# Patient Record
Sex: MALE | Race: WHITE | Employment: UNEMPLOYED | URBAN - METROPOLITAN AREA
[De-identification: names, ages, dates, MRNs, and addresses within clinical notes are randomized per-mention and may not be internally consistent; named-entity substitution may affect disease eponyms.]

---

## 2017-03-15 ENCOUNTER — HOSPITAL ENCOUNTER (INPATIENT)
Age: 47
LOS: 9 days | Discharge: HOME | DRG: 189 | End: 2017-03-24
Attending: STUDENT IN AN ORGANIZED HEALTH CARE EDUCATION/TRAINING PROGRAM | Admitting: INTERNAL MEDICINE
Payer: COMMERCIAL

## 2017-03-15 ENCOUNTER — APPOINTMENT (OUTPATIENT)
Dept: GENERAL RADIOLOGY | Age: 47
DRG: 189 | End: 2017-03-15
Attending: STUDENT IN AN ORGANIZED HEALTH CARE EDUCATION/TRAINING PROGRAM
Payer: COMMERCIAL

## 2017-03-15 ENCOUNTER — APPOINTMENT (OUTPATIENT)
Dept: CT IMAGING | Age: 47
DRG: 189 | End: 2017-03-15
Attending: INTERNAL MEDICINE
Payer: COMMERCIAL

## 2017-03-15 DIAGNOSIS — R09.02 HYPOXIA: Primary | ICD-10-CM

## 2017-03-15 DIAGNOSIS — R06.02 SOB (SHORTNESS OF BREATH): ICD-10-CM

## 2017-03-15 DIAGNOSIS — R07.89 ACUTE CHEST WALL PAIN: ICD-10-CM

## 2017-03-15 DIAGNOSIS — T40.2X5A THERAPEUTIC OPIOID INDUCED CONSTIPATION: ICD-10-CM

## 2017-03-15 DIAGNOSIS — S22.42XA CLOSED FRACTURE OF MULTIPLE RIBS OF LEFT SIDE, INITIAL ENCOUNTER: ICD-10-CM

## 2017-03-15 DIAGNOSIS — K59.03 THERAPEUTIC OPIOID INDUCED CONSTIPATION: ICD-10-CM

## 2017-03-15 PROBLEM — J96.01 ACUTE HYPOXEMIC RESPIRATORY FAILURE (HCC): Status: ACTIVE | Noted: 2017-03-15

## 2017-03-15 PROBLEM — S22.49XA RIB FRACTURES: Status: ACTIVE | Noted: 2017-03-15

## 2017-03-15 LAB
ALBUMIN SERPL BCP-MCNC: 4.5 G/DL (ref 3.5–5)
ALBUMIN/GLOB SERPL: 1.2 {RATIO} (ref 1.1–2.2)
ALP SERPL-CCNC: 60 U/L (ref 45–117)
ALT SERPL-CCNC: 86 U/L (ref 12–78)
AMPHET UR QL SCN: NEGATIVE
ANION GAP BLD CALC-SCNC: 13 MMOL/L (ref 5–15)
APPEARANCE UR: CLEAR
ARTERIAL PATENCY WRIST A: YES
AST SERPL W P-5'-P-CCNC: 76 U/L (ref 15–37)
BACTERIA URNS QL MICRO: ABNORMAL /HPF
BARBITURATES UR QL SCN: NEGATIVE
BASE DEFICIT BLDA-SCNC: 2 MMOL/L
BASOPHILS # BLD AUTO: 0 K/UL (ref 0–0.1)
BASOPHILS # BLD: 1 % (ref 0–1)
BDY SITE: ABNORMAL
BENZODIAZ UR QL: NEGATIVE
BILIRUB SERPL-MCNC: 0.7 MG/DL (ref 0.2–1)
BILIRUB UR QL CFM: NEGATIVE
BUN SERPL-MCNC: 12 MG/DL (ref 6–20)
BUN/CREAT SERPL: 13 (ref 12–20)
CALCIUM SERPL-MCNC: 9.4 MG/DL (ref 8.5–10.1)
CANNABINOIDS UR QL SCN: POSITIVE
CAOX CRY URNS QL MICRO: ABNORMAL
CHLORIDE SERPL-SCNC: 102 MMOL/L (ref 97–108)
CO2 SERPL-SCNC: 22 MMOL/L (ref 21–32)
COCAINE UR QL SCN: NEGATIVE
COLOR UR: ABNORMAL
CREAT SERPL-MCNC: 0.89 MG/DL (ref 0.7–1.3)
D DIMER PPP FEU-MCNC: 0.4 MG/L FEU (ref 0–0.65)
DRUG SCRN COMMENT,DRGCM: ABNORMAL
EOSINOPHIL # BLD: 0.1 K/UL (ref 0–0.4)
EOSINOPHIL NFR BLD: 3 % (ref 0–7)
EPITH CASTS URNS QL MICRO: ABNORMAL /LPF
ERYTHROCYTE [DISTWIDTH] IN BLOOD BY AUTOMATED COUNT: 12.7 % (ref 11.5–14.5)
ETHANOL SERPL-MCNC: <10 MG/DL
GLOBULIN SER CALC-MCNC: 3.7 G/DL (ref 2–4)
GLUCOSE SERPL-MCNC: 123 MG/DL (ref 65–100)
GLUCOSE UR STRIP.AUTO-MCNC: NEGATIVE MG/DL
HCO3 BLDA-SCNC: 22 MMOL/L (ref 22–26)
HCT VFR BLD AUTO: 43.7 % (ref 36.6–50.3)
HGB BLD-MCNC: 15.2 G/DL (ref 12.1–17)
HGB UR QL STRIP: NEGATIVE
KETONES UR QL STRIP.AUTO: NEGATIVE MG/DL
LACTATE SERPL-SCNC: 1 MMOL/L (ref 0.4–2)
LEUKOCYTE ESTERASE UR QL STRIP.AUTO: NEGATIVE
LYMPHOCYTES # BLD AUTO: 27 % (ref 12–49)
LYMPHOCYTES # BLD: 1 K/UL (ref 0.8–3.5)
MCH RBC QN AUTO: 34.5 PG (ref 26–34)
MCHC RBC AUTO-ENTMCNC: 34.8 G/DL (ref 30–36.5)
MCV RBC AUTO: 99.1 FL (ref 80–99)
METHADONE UR QL: NEGATIVE
MONOCYTES # BLD: 0.2 K/UL (ref 0–1)
MONOCYTES NFR BLD AUTO: 5 % (ref 5–13)
MUCOUS THREADS URNS QL MICRO: ABNORMAL /LPF
NEUTS SEG # BLD: 2.4 K/UL (ref 1.8–8)
NEUTS SEG NFR BLD AUTO: 64 % (ref 32–75)
NITRITE UR QL STRIP.AUTO: NEGATIVE
OPIATES UR QL: POSITIVE
PCO2 BLDA: 37 MMHG (ref 35–45)
PCP UR QL: NEGATIVE
PH BLDA: 7.4 [PH] (ref 7.35–7.45)
PH UR STRIP: 5.5 [PH] (ref 5–8)
PLATELET # BLD AUTO: 70 K/UL (ref 150–400)
PO2 BLDA: 58 MMHG (ref 80–100)
POTASSIUM SERPL-SCNC: 4.3 MMOL/L (ref 3.5–5.1)
PROT SERPL-MCNC: 8.2 G/DL (ref 6.4–8.2)
PROT UR STRIP-MCNC: 30 MG/DL
RBC # BLD AUTO: 4.41 M/UL (ref 4.1–5.7)
RBC #/AREA URNS HPF: ABNORMAL /HPF (ref 0–5)
SAO2 % BLD: 91 % (ref 92–97)
SAO2% DEVICE SAO2% SENSOR NAME: ABNORMAL
SERVICE CMNT-IMP: ABNORMAL
SODIUM SERPL-SCNC: 137 MMOL/L (ref 136–145)
SP GR UR REFRACTOMETRY: >1.03 (ref 1–1.03)
SPECIMEN SITE: ABNORMAL
TROPONIN I SERPL-MCNC: <0.04 NG/ML
UROBILINOGEN UR QL STRIP.AUTO: 0.2 EU/DL (ref 0.2–1)
WBC # BLD AUTO: 3.8 K/UL (ref 4.1–11.1)
WBC URNS QL MICRO: ABNORMAL /HPF (ref 0–4)

## 2017-03-15 PROCEDURE — 74011250636 HC RX REV CODE- 250/636: Performed by: STUDENT IN AN ORGANIZED HEALTH CARE EDUCATION/TRAINING PROGRAM

## 2017-03-15 PROCEDURE — 93970 EXTREMITY STUDY: CPT

## 2017-03-15 PROCEDURE — 36600 WITHDRAWAL OF ARTERIAL BLOOD: CPT | Performed by: INTERNAL MEDICINE

## 2017-03-15 PROCEDURE — 81001 URINALYSIS AUTO W/SCOPE: CPT | Performed by: INTERNAL MEDICINE

## 2017-03-15 PROCEDURE — 87040 BLOOD CULTURE FOR BACTERIA: CPT | Performed by: STUDENT IN AN ORGANIZED HEALTH CARE EDUCATION/TRAINING PROGRAM

## 2017-03-15 PROCEDURE — 96372 THER/PROPH/DIAG INJ SC/IM: CPT

## 2017-03-15 PROCEDURE — 71020 XR CHEST PA LAT: CPT

## 2017-03-15 PROCEDURE — 80053 COMPREHEN METABOLIC PANEL: CPT | Performed by: STUDENT IN AN ORGANIZED HEALTH CARE EDUCATION/TRAINING PROGRAM

## 2017-03-15 PROCEDURE — 85025 COMPLETE CBC W/AUTO DIFF WBC: CPT | Performed by: STUDENT IN AN ORGANIZED HEALTH CARE EDUCATION/TRAINING PROGRAM

## 2017-03-15 PROCEDURE — 85379 FIBRIN DEGRADATION QUANT: CPT | Performed by: INTERNAL MEDICINE

## 2017-03-15 PROCEDURE — 74011250636 HC RX REV CODE- 250/636: Performed by: INTERNAL MEDICINE

## 2017-03-15 PROCEDURE — 65660000000 HC RM CCU STEPDOWN

## 2017-03-15 PROCEDURE — 74011250637 HC RX REV CODE- 250/637: Performed by: INTERNAL MEDICINE

## 2017-03-15 PROCEDURE — 80307 DRUG TEST PRSMV CHEM ANLYZR: CPT | Performed by: INTERNAL MEDICINE

## 2017-03-15 PROCEDURE — 74011250637 HC RX REV CODE- 250/637: Performed by: PHYSICIAN ASSISTANT

## 2017-03-15 PROCEDURE — 82803 BLOOD GASES ANY COMBINATION: CPT | Performed by: INTERNAL MEDICINE

## 2017-03-15 PROCEDURE — 83605 ASSAY OF LACTIC ACID: CPT | Performed by: STUDENT IN AN ORGANIZED HEALTH CARE EDUCATION/TRAINING PROGRAM

## 2017-03-15 PROCEDURE — 99285 EMERGENCY DEPT VISIT HI MDM: CPT

## 2017-03-15 PROCEDURE — 74011250637 HC RX REV CODE- 250/637: Performed by: STUDENT IN AN ORGANIZED HEALTH CARE EDUCATION/TRAINING PROGRAM

## 2017-03-15 PROCEDURE — 94762 N-INVAS EAR/PLS OXIMTRY CONT: CPT

## 2017-03-15 PROCEDURE — 51798 US URINE CAPACITY MEASURE: CPT

## 2017-03-15 PROCEDURE — 36415 COLL VENOUS BLD VENIPUNCTURE: CPT | Performed by: STUDENT IN AN ORGANIZED HEALTH CARE EDUCATION/TRAINING PROGRAM

## 2017-03-15 PROCEDURE — 84484 ASSAY OF TROPONIN QUANT: CPT | Performed by: PHYSICIAN ASSISTANT

## 2017-03-15 RX ORDER — ENOXAPARIN SODIUM 100 MG/ML
40 INJECTION SUBCUTANEOUS EVERY 24 HOURS
Status: DISCONTINUED | OUTPATIENT
Start: 2017-03-15 | End: 2017-03-15

## 2017-03-15 RX ORDER — NALOXONE HYDROCHLORIDE 0.4 MG/ML
0.4 INJECTION, SOLUTION INTRAMUSCULAR; INTRAVENOUS; SUBCUTANEOUS AS NEEDED
Status: DISCONTINUED | OUTPATIENT
Start: 2017-03-15 | End: 2017-03-24 | Stop reason: HOSPADM

## 2017-03-15 RX ORDER — ENOXAPARIN SODIUM 100 MG/ML
100 INJECTION SUBCUTANEOUS EVERY 12 HOURS
Status: DISCONTINUED | OUTPATIENT
Start: 2017-03-15 | End: 2017-03-15

## 2017-03-15 RX ORDER — DIPHENHYDRAMINE HCL 25 MG
25 CAPSULE ORAL
Status: DISCONTINUED | OUTPATIENT
Start: 2017-03-15 | End: 2017-03-16

## 2017-03-15 RX ORDER — ONDANSETRON 2 MG/ML
4 INJECTION INTRAMUSCULAR; INTRAVENOUS
Status: DISCONTINUED | OUTPATIENT
Start: 2017-03-15 | End: 2017-03-24 | Stop reason: HOSPADM

## 2017-03-15 RX ORDER — PANTOPRAZOLE SODIUM 40 MG/1
40 TABLET, DELAYED RELEASE ORAL EVERY OTHER DAY
Status: DISCONTINUED | OUTPATIENT
Start: 2017-03-15 | End: 2017-03-24 | Stop reason: HOSPADM

## 2017-03-15 RX ORDER — DIPHENHYDRAMINE HCL 25 MG
25 CAPSULE ORAL
Status: DISCONTINUED | OUTPATIENT
Start: 2017-03-15 | End: 2017-03-15 | Stop reason: SDUPTHER

## 2017-03-15 RX ORDER — SODIUM CHLORIDE 9 MG/ML
50 INJECTION, SOLUTION INTRAVENOUS CONTINUOUS
Status: DISCONTINUED | OUTPATIENT
Start: 2017-03-15 | End: 2017-03-23

## 2017-03-15 RX ORDER — ACETAMINOPHEN 325 MG/1
650 TABLET ORAL
Status: DISCONTINUED | OUTPATIENT
Start: 2017-03-15 | End: 2017-03-17

## 2017-03-15 RX ORDER — FENOFIBRATE 145 MG/1
145 TABLET, COATED ORAL DAILY
Status: DISCONTINUED | OUTPATIENT
Start: 2017-03-15 | End: 2017-03-24 | Stop reason: HOSPADM

## 2017-03-15 RX ORDER — KETOROLAC TROMETHAMINE 30 MG/ML
60 INJECTION, SOLUTION INTRAMUSCULAR; INTRAVENOUS ONCE
Status: COMPLETED | OUTPATIENT
Start: 2017-03-15 | End: 2017-03-15

## 2017-03-15 RX ORDER — GUAIFENESIN 600 MG/1
600 TABLET, EXTENDED RELEASE ORAL EVERY 12 HOURS
Status: DISCONTINUED | OUTPATIENT
Start: 2017-03-15 | End: 2017-03-24 | Stop reason: HOSPADM

## 2017-03-15 RX ORDER — HYDROCODONE BITARTRATE AND ACETAMINOPHEN 5; 325 MG/1; MG/1
1 TABLET ORAL
Status: DISCONTINUED | OUTPATIENT
Start: 2017-03-15 | End: 2017-03-24 | Stop reason: HOSPADM

## 2017-03-15 RX ORDER — ROSUVASTATIN CALCIUM 10 MG/1
10 TABLET, COATED ORAL DAILY
Status: DISCONTINUED | OUTPATIENT
Start: 2017-03-15 | End: 2017-03-24 | Stop reason: HOSPADM

## 2017-03-15 RX ORDER — HYDROCODONE BITARTRATE AND ACETAMINOPHEN 5; 300 MG/1; MG/1
1 TABLET ORAL
COMMUNITY
End: 2017-03-24

## 2017-03-15 RX ORDER — ACETAMINOPHEN 500 MG
1000 TABLET ORAL
Status: COMPLETED | OUTPATIENT
Start: 2017-03-15 | End: 2017-03-15

## 2017-03-15 RX ORDER — MORPHINE SULFATE 4 MG/ML
4 INJECTION, SOLUTION INTRAMUSCULAR; INTRAVENOUS
Status: COMPLETED | OUTPATIENT
Start: 2017-03-15 | End: 2017-03-15

## 2017-03-15 RX ORDER — HYDROMORPHONE HYDROCHLORIDE 2 MG/1
2 TABLET ORAL
Status: DISCONTINUED | OUTPATIENT
Start: 2017-03-15 | End: 2017-03-16

## 2017-03-15 RX ORDER — LIDOCAINE 50 MG/G
1 PATCH TOPICAL EVERY 24 HOURS
Status: DISCONTINUED | OUTPATIENT
Start: 2017-03-15 | End: 2017-03-24 | Stop reason: HOSPADM

## 2017-03-15 RX ADMIN — FENOFIBRATE 145 MG: 145 TABLET ORAL at 13:22

## 2017-03-15 RX ADMIN — GUAIFENESIN 600 MG: 600 TABLET, EXTENDED RELEASE ORAL at 20:32

## 2017-03-15 RX ADMIN — PANTOPRAZOLE SODIUM 40 MG: 40 TABLET, DELAYED RELEASE ORAL at 12:03

## 2017-03-15 RX ADMIN — DIPHENHYDRAMINE HYDROCHLORIDE 25 MG: 25 CAPSULE ORAL at 16:12

## 2017-03-15 RX ADMIN — HYDROMORPHONE HYDROCHLORIDE 2 MG: 2 TABLET ORAL at 16:13

## 2017-03-15 RX ADMIN — HYDROMORPHONE HYDROCHLORIDE 2 MG: 2 TABLET ORAL at 12:03

## 2017-03-15 RX ADMIN — ACETAMINOPHEN 1000 MG: 500 TABLET ORAL at 08:16

## 2017-03-15 RX ADMIN — HYDROMORPHONE HYDROCHLORIDE 2 MG: 2 TABLET ORAL at 20:32

## 2017-03-15 RX ADMIN — DIPHENHYDRAMINE HYDROCHLORIDE 25 MG: 25 CAPSULE ORAL at 20:32

## 2017-03-15 RX ADMIN — SODIUM CHLORIDE 50 ML/HR: 900 INJECTION, SOLUTION INTRAVENOUS at 12:03

## 2017-03-15 RX ADMIN — Medication 4 MG: at 10:14

## 2017-03-15 RX ADMIN — DIPHENHYDRAMINE HYDROCHLORIDE 25 MG: 25 CAPSULE ORAL at 12:03

## 2017-03-15 RX ADMIN — KETOROLAC TROMETHAMINE 60 MG: 30 INJECTION, SOLUTION INTRAMUSCULAR at 08:17

## 2017-03-15 RX ADMIN — ROSUVASTATIN CALCIUM 10 MG: 10 TABLET, FILM COATED ORAL at 13:22

## 2017-03-15 RX ADMIN — GUAIFENESIN 600 MG: 600 TABLET, EXTENDED RELEASE ORAL at 13:22

## 2017-03-15 RX ADMIN — HYDROCODONE BITARTRATE AND ACETAMINOPHEN 1 TABLET: 5; 325 TABLET ORAL at 21:44

## 2017-03-15 NOTE — PROCEDURES
[facility]  *** FINAL REPORT ***    Name: Marge Yu  MRN: [id number]    Inpatient  : 1970  HIS Order #: [id number]  [address] [facility] Visit #: [id number]  Date: 15 Mar 2017    TYPE OF TEST: Peripheral Venous Testing    REASON FOR TEST  Shortness of breath    Right Leg:-  Deep venous thrombosis:           No  Superficial venous thrombosis:    No  Deep venous insufficiency:        No  Superficial venous insufficiency: No    Left Leg:-  Deep venous thrombosis:           No  Superficial venous thrombosis:    No  Deep venous insufficiency:        No  Superficial venous insufficiency: No      INTERPRETATION/FINDINGS  PROCEDURE:  BILATERAL LE VENOUS DUPLEX. Evaluation of lower extremity veins with ultrasound (B-mode imaging,  pulsed Doppler, color Doppler). Includes the common femoral, deep  femoral, femoral, popliteal, posterior tibial, peroneal, and great  saphenous veins. FINDINGS:  Dennis Prose scale and color flow duplex images of the veins in  both lower extremities demonstrate normal compressibility, spontaneous   and augmented flow profiles, and absence of filling defects  throughout the deep and superficial veins in both lower extremities. CONCLUSION:  Bilateral lower extremity venous duplex negative for deep   venous thrombosis or thrombophlebitis. ADDITIONAL COMMENTS    I have personally reviewed the data relevant to the interpretation of  this  study. TECHNOLOGIST: Berta Hollins. Alvaro  Signed: 03/15/2017 05:49 PM    PHYSICIAN: Vashti Dawley. Buck Boast, MD  Signed: 2017 11:57 AM

## 2017-03-15 NOTE — PROGRESS NOTES
[facility]: MED RECONCILIATION    Comments/Recommendations:    Patient was alert and oriented   Allergies were verified with patient; no new allergies reported   Patient stated he takes omeprazole after eating in the morning since he has to take the fenofibrate with food; counseled on benefit of taking 1 hour before eating   Patient was prescribed hydrocodone-APAP 5/300mg back on 02/12/2017; still had most of the bottle left and has been taking 1 tab every 4 hours since Sunday due to rib injury (prescribed every 6 hours as needed)   Takes omeprazole every other day (last dose was Monday 03/13/2017)    Medications added:     · Hydrocodone-APAP 5/300mg    Medications removed:    · Oxycodone IR 5mg (1 tablet every 4 hours as needed for pain)    Medications adjusted:    · None    Information obtained from: patient, Rx Query    Allergies: Wellbutrin [bupropion]; Adhesive tape-silicones; Azithromycin; Cephalexin; Hydromorphone; and Ketamine    Prior to Admission Medications:   Prior to Admission Medications   Prescriptions Last Dose Informant Patient Reported? Taking? HYDROcodone-acetaminophen (XODOL) 5-300 mg tablet 3/14/2017 at hs Self Yes Yes   Sig: Take 1 Tab by mouth every four (4) hours as needed. MULTIVIT-MINERALS/FOLIC ACID (DAILY GUMMIES PO) 3/14/2017 at am Self Yes Yes   Sig: Take 2 Tabs by mouth daily. cetirizine (ZYRTEC) 10 mg tablet 3/14/2017 at am Self Yes Yes   Sig: Take 10 mg by mouth daily. In the morning. cholecalciferol, vitamin D3, 2,000 unit tab 3/14/2017 at am Self Yes Yes   Sig: Take 4,000 Units by mouth daily. In the morning. fenofibrate (LOFIBRA) 160 mg tablet 3/14/2017 at am Self Yes Yes   Sig: Take 160 mg by mouth daily. In the morning. omeprazole (PRILOSEC) 20 mg capsule 3/13/2017 at am Self Yes Yes   Sig: Take 20 mg by mouth every other day. In the morning. rosuvastatin (CRESTOR) 10 mg tablet 3/14/2017 at pm Self Yes Yes   Sig: Take 10 mg by mouth daily. In the morning. Facility-Administered Medications: None     Thank you,  [employer]  Class of 2017

## 2017-03-15 NOTE — H&P
[facility]  [address]  [phone]    Hospitalist Admission Note      NAME:  Akiko Jernigan   :   1970   MRN:  [id number]     PCP:  Fabián Alan MD     Date/Time:  3/15/2017 9:50 AM          Subjective:     CHIEF COMPLAINT: dyspnea     HISTORY OF PRESENT ILLNESS:     Mr. Preet Hopson is a 48 y.o.  male who presented to the Emergency Department complaining of dyspnea. It has progressed over 3 days since a fall in which he injured left ribs. He was placed on PO percocet. DE LA TORRE worsened over days, with cough, inability to clear his thin white sputum, and severe pleuritic pain. ER workup shows severe hypoxia, low platelets, but no signs of PNA. We will admit him for management. Past Medical History:   Diagnosis Date    Diverticula of colon     GERD (gastroesophageal reflux disease)     occasional    Hypercholesteremia     Microscopic hematuria     Sleep apnea         Past Surgical History:   Procedure Laterality Date    HX APPENDECTOMY      HX 2008    left ear TM graft    HX HEENT  Sep 2009    left ear surgery - cholestoma    HX HEENT  Sep 2010    left ear TM graft revision    HX HEENT  Aug 1998    left ear tube placement    HX ORTHOPAEDIC      left knee A/S x 3    HX ORTHOPAEDIC      left knee ACL repair       Social History   Substance Use Topics    Smoking status: Current Every Day Smoker     Packs/day: 1.00     Years: 27.00    Smokeless tobacco: Never Used    Alcohol use 1.0 oz/week     1 Cans of beer, 1 Standard drinks or equivalent per week      Comment: 3x/ wk        Family History   Problem Relation Age of Onset    Heart Disease Father     Hypertension Sister         Allergies   Allergen Reactions    Wellbutrin [Bupropion] Swelling     Angioedema      Adhesive Tape-Silicones Contact Dermatitis     Pt reports he can tolerate paper tape.     Azithromycin Hives    Cephalexin Hives     Pt has taken Augmentin before and tolerated it per rn    Hydromorphone Itching and Nausea Only    Ketamine Hives and Swelling        Prior to Admission medications    Medication Sig Start Date End Date Taking? Authorizing Provider   HYDROcodone-acetaminophen (XODOL) 5-300 mg tablet Take 1 Tab by mouth every four (4) hours as needed. Yes Historical Provider   cetirizine (ZYRTEC) 10 mg tablet Take 10 mg by mouth daily. In the morning. Yes Historical Provider   fenofibrate (LOFIBRA) 160 mg tablet Take 160 mg by mouth daily. In the morning. Yes Historical Provider   rosuvastatin (CRESTOR) 10 mg tablet Take 10 mg by mouth daily. In the morning. Yes Historical Provider   omeprazole (PRILOSEC) 20 mg capsule Take 20 mg by mouth every other day. In the morning. Yes Historical Provider   cholecalciferol, vitamin D3, 2,000 unit tab Take 4,000 Units by mouth daily. In the morning. Yes Historical Provider   MULTIVIT-MINERALS/FOLIC ACID (DAILY GUMMIES PO) Take 2 Tabs by mouth daily.    Yes Historical Provider       Review of Systems:  (bold if positive, if negative)    Gen:  Eyes:  ENT:  CVS:  chest wall painPulm:  CoughdyspneaGI:    :    MS:  PainSkin:  Psych:  Endo:    Hem:  Renal:    Neuro:        Objective:      VITALS:    Vital signs reviewed; most recent are:    Visit Vitals    BP (!) 153/96    Pulse 86    Temp 98.2 °F (36.8 °C)    Resp 19    Wt 95.3 kg (210 lb)    SpO2 93%    BMI 32.89 kg/m2     SpO2 Readings from Last 6 Encounters:   03/15/17 93%   06/21/16 97%   06/06/16 94%   11/03/11 96%   10/26/11 97%    O2 Flow Rate (L/min): 15 l/min   No intake or output data in the 24 hours ending 03/15/17 0950     Exam:     Physical Exam:    Gen:  Well-developed, well-nourished, in no acute distress  HEENT:  Pink conjunctivae, PERRL, hearing intact to voice, moist mucous membranes  Neck:  Supple, without masses, thyroid non-tender  Resp: Shallow accessory muscle use, clear breath sounds without wheezes rales or rhonchi  Card:  No murmurs, tachycardic S1, S2 without thrills, bruits or peripheral edema  Abd:  Soft, non-tender, non-distended, normoactive bowel sounds are present, no mass  Lymph:  No cervical or inguinal adenopathy  Musc:  No cyanosis or clubbing, tender below L scapula  Skin:  No rashes or ulcers, skin turgor is good  Neuro:  Cranial nerves are grossly intact, no focal motor weakness, follows commands appropriately  Psych:  Good insight, oriented to person, place and time, alert     Labs:    Recent Labs      03/15/17   0756   WBC  3.8*   HGB  15.2   HCT  43.7   PLT  70*     Recent Labs      03/15/17   0756   NA  137   K  4.3   CL  102   CO2  22   GLU  123*   BUN  12   CREA  0.89   CA  9.4   ALB  4.5   TBILI  0.7   SGOT  76*   ALT  86*     No results found for: GLUCPOC  No results for input(s): PH, PCO2, PO2, HCO3, FIO2 in the last 72 hours. No results for input(s): INR in the last 72 hours. No lab exists for component: INREXT  All Micro Results     Procedure Component Value Units Date/Time    CULTURE, BLOOD, PAIRED [[id number]]     Order Status:  Sent Specimen:  Blood           I have reviewed previous records       Assessment and Plan:      Acute hypoxemic respiratory failure - POA, unclear etiology. Splinting? Narcotic suppression? Lung contusion? PE? Admit to stepdown, as he is on NRB and may deteriorate requiring BiPAP. Check ABG. Check DDimer, CTA and LE doppler to assess for DVT/PE. Sleep apnea - Resume CPAP if tolerated. Rib fractures - POA, due to fall. CT to assess for lytic lesions. I cannot consult Ortho, who does not handle rib fractures. They recommended thoracic surgery consult, which is not available at [facility]. Pain control. Check drug and alcohol screen for fall risk assessment. Hyperlipidemia - Continue rosuvastatin and fenofibrate    GERD (gastroesophageal reflux disease) - Continue PPI    Thrombocytopenia - Follow. Loss due to clots after fall? Spleen? Tobacco abuse - Provide patch. Advised cessation. Spent 5 minutes in addition to all other time spent on admission, noted below.        Telemetry reviewed:   normal sinus rhythm    Risk of deterioration: high      Total time spent with patient: 79 Minutes Signed out to Dr Tye Powell at [facility] discussed with: Patient, Nursing Staff and >50% of time spent in counseling and coordination of care    Discussed:  Care Plan       ___________________________________________________    Attending Physician: Rafaela Edwards MD

## 2017-03-15 NOTE — IP AVS SNAPSHOT
[facility] 
 
 
 [address] 
[phone] Patient: Juliette Rodriguez MRN: [id number] MNV:3/9/0503 You are allergic to the following Allergen Reactions Wellbutrin (Bupropion) Swelling Angioedema Adhesive Tape-Silicones Contact Dermatitis Pt reports he can tolerate paper tape. Azithromycin Hives Cephalexin Hives Pt has taken Augmentin before and tolerated it per rn Hydromorphone Itching Nausea Only Ketamine Hives Swelling Recent Documentation Height Weight BMI Smoking Status 1.702 m 90.2 kg 31.14 kg/m2 Current Every Day Smoker Emergency Contacts Name Discharge Info Relation Home Work Mobile [address] CAREGIVER [3] Spouse [3] [phone] About your hospitalization You were admitted on:  March 15, 2017 You last received care in the:  [facility] 5M1 MED SURG 1 You were discharged on:  March 24, 2017 Unit phone number:  [phone] Why you were hospitalized Your primary diagnosis was:  Acute Hypoxemic Respiratory Failure (Hcc) Your diagnoses also included:  Gerd (Gastroesophageal Reflux Disease), Hypercholesteremia, Hyperlipidemia, Sleep Apnea, Rib Fractures, Acute Chest Wall Pain, Cough, Sob (Shortness Of Breath), Therapeutic Opioid Induced Constipation Providers Seen During Your Hospitalizations Provider Role Specialty Primary office phone Mikhail Au MD Attending Provider Emergency Medicine [phone] David Quesada MD Attending Provider [facility] [phone] Your Primary Care Physician (PCP) Primary Care Physician Office Phone Office Fax Trena Hsieh [phone] Follow-up Information Follow up With Details Comments Contact Info MD Zach Ingram [address] [place] [address] 53 [address] [phone] Current Discharge Medication List  
  
START taking these medications Dose & Instructions Dispensing Information Comments Morning Noon Evening Bedtime  
 ibuprofen 800 mg tablet Commonly known as:  MOTRIN Your last dose was: Your next dose is:    
   
   
 Dose:  800 mg Take 1 Tab by mouth three (3) times daily. Quantity:  90 Tab Refills:  0  
     
   
   
   
  
 lidocaine 5 % Commonly known as:  Arzella Getting Your last dose was: Your next dose is:    
   
   
 Apply patch to the affected area for 12 hours a day and remove for 12 hours a day. Quantity:  30 Each Refills:  0 CONTINUE these medications which have NOT CHANGED Dose & Instructions Dispensing Information Comments Morning Noon Evening Bedtime  
 cetirizine 10 mg tablet Commonly known as:  ZYRTEC Your last dose was: Your next dose is:    
   
   
 Dose:  10 mg Take 10 mg by mouth daily. In the morning. Refills:  0  
     
   
   
   
  
 cholecalciferol (vitamin D3) 2,000 unit Tab Your last dose was: Your next dose is:    
   
   
 Dose:  4000 Units Take 4,000 Units by mouth daily. In the morning. Refills:  0 DAILY GUMMIES PO Your last dose was: Your next dose is:    
   
   
 Dose:  2 Tab Take 2 Tabs by mouth daily. Refills:  0  
     
   
   
   
  
 fenofibrate 160 mg tablet Commonly known as:  LOFIBRA Your last dose was: Your next dose is:    
   
   
 Dose:  160 mg Take 160 mg by mouth daily. In the morning. Refills:  0  
     
   
   
   
  
 omeprazole 20 mg capsule Commonly known as:  PRILOSEC Your last dose was: Your next dose is:    
   
   
 Dose:  20 mg Take 20 mg by mouth every other day. In the morning. Refills:  0  
     
   
   
   
  
 rosuvastatin 10 mg tablet Commonly known as:  CRESTOR  
   
 Your last dose was: Your next dose is:    
   
   
 Dose:  10 mg Take 10 mg by mouth daily. In the morning. Refills:  0 STOP taking these medications HYDROcodone-acetaminophen 5-300 mg tablet Commonly known as:  Iban Demarco Where to Get Your Medications Information on where to get these meds will be given to you by the nurse or doctor. ! Ask your nurse or doctor about these medications  
  ibuprofen 800 mg tablet  
 lidocaine 5 % Discharge Instructions Patient Discharge Instructions Consuelo Sirena / [id number] : 1970 Admitted 3/15/2017 Discharged: 3/24/2017 7:02 AM  
 
ACUTE DIAGNOSES: 
Acute hypoxemic respiratory failure (RUST 75.) CHRONIC MEDICAL DIAGNOSES: 
Problem List as of 3/24/2017  Date Reviewed: 3/15/2017 Codes Class Noted - Resolved Acute chest wall pain ICD-10-CM: R07.89 ICD-9-CM: 786.52  3/17/2017 - Present Cough ICD-10-CM: R05 ICD-9-CM: 786.2  3/17/2017 - Present SOB (shortness of breath) ICD-10-CM: R06.02 
ICD-9-CM: 786.05  3/17/2017 - Present Therapeutic opioid induced constipation ICD-10-CM: K59.03, T40.2X5A 
ICD-9-CM: 564.09, E935.2  3/17/2017 - Present * (Principal)Acute hypoxemic respiratory failure (RUST 75.) ICD-10-CM: J96.01 
ICD-9-CM: 518.81  3/15/2017 - Present Sleep apnea ICD-10-CM: G47.30 ICD-9-CM: 780.57  Unknown - Present Hypercholesteremia ICD-10-CM: E78.00 ICD-9-CM: 272.0  Unknown - Present Rib fractures ICD-10-CM: A81.57RC ICD-9-CM: 807.00  3/15/2017 - Present Hyperlipidemia ICD-10-CM: E78.5 ICD-9-CM: 272.4  2016 - Present GERD (gastroesophageal reflux disease) ICD-10-CM: K21.9 ICD-9-CM: 530.81  2016 - Present RESOLVED: Diverticulitis of intestine with abscess ICD-10-CM: K57.80 ICD-9-CM: 562.11, 569.5  2016 - 3/15/2017  RESOLVED: Diverticulitis ICD-10-CM: K13.91 
 ICD-9-CM: 562.11  5/31/2016 - 3/15/2017 RESOLVED: Bladder wall thickening ICD-10-CM: N32.89 ICD-9-CM: 596.89  5/31/2016 - 3/15/2017 RESOLVED: Hematuria ICD-10-CM: R31.9 ICD-9-CM: 599.70  5/31/2016 - 3/15/2017 DISCHARGE MEDICATIONS:  
 
 
 
· It is important that you take the medication exactly as they are prescribed. · Keep your medication in the bottles provided by the pharmacist and keep a list of the medication names, dosages, and times to be taken in your wallet. · Do not take other medications without consulting your doctor. DIET:  Regular Diet ACTIVITY: Activity as tolerated ADDITIONAL INFORMATION: If you experience any of the following symptoms then please call your primary care physician or return to the emergency room if you cannot get hold of your doctor: Fever, chills, nausea, vomiting, diarrhea, change in mentation, falling, bleeding, shortness of breath. FOLLOW UP CARE: 
Dr. Freddie Henry MD  you are to call and set up an appointment to see them with in 1 week. Use incentive spirometry throughout the day Follow-up with specialists at directed by them Information obtained by : 
I understand that if any problems occur once I am at home I am to contact my physician. I understand and acknowledge receipt of the instructions indicated above. Physician's or R.N.'s Signature                                                                  Date/Time Patient or Representative Signature                                                          Date/Time Discharge Orders None MyChart Announcement We are excited to announce that we are making your provider's discharge notes available to you in [employer] You will see these notes when they are completed and signed by the physician that discharged you from your recent hospital stay. If you have any questions or concerns about any information you see in [employer]!, please call the Health Information Department where you were seen or reach out to your Primary Care Provider for more information about your plan of care. Introducing [facility]! Marquise Wynn introduces [employer]! patient portal. Now you can access parts of your medical record, email your doctor's office, and request medication refills online. 1. In your internet browser, go to [email or web address] 2. Click on the First Time User? Click Here link in the Sign In box. You will see the New Member Sign Up page. 3. Enter your [employer]! Access Code exactly as it appears below. You will not need to use this code after youve completed the sign-up process. If you do not sign up before the expiration date, you must request a new code. · [employer]! Access Code: [email or web address] Expires: 6/13/2017  7:26 AM 
 
4. Enter the last four digits of your Social Security Number (xxxx) and Date of Birth (mm/dd/yyyy) as indicated and click Submit. You will be taken to the next sign-up page. 5. Create a [employer]! ID. This will be your [employer]! login ID and cannot be changed, so think of one that is secure and easy to remember. 6. Create a [employer] password. You can change your password at any time. 7. Enter your Password Reset Question and Answer. This can be used at a later time if you forget your password. 8. Enter your e-mail address. You will receive e-mail notification when new information is available in [address] 9. Click Sign Up. You can now view and download portions of your medical record.  
10. Click the Download Summary menu link to download a portable copy of your medical information. If you have questions, please visit the Frequently Asked Questions section of the [employer] website. Remember, MyChart is NOT to be used for urgent needs. For medical emergencies, dial 911. Now available from your iPhone and Android! General Information Please provide this summary of care documentation to your next provider. Patient Signature:  ____________________________________________________________ Date:  ____________________________________________________________  
  
Marvetta Land Provider Signature:  ____________________________________________________________ Date:  ____________________________________________________________

## 2017-03-15 NOTE — CONSULTS
Name: Portia Citizen: [address]   : 1970 Admit Date: 3/15/2017   Phone:   Room: [facility]   PCP: Pj Elliott MD  MRN: [id number]   Date: 3/15/2017  Code: Full Code        HPI:    Chart and notes reviewed. Data reviewed. I review the patient's current medications in the medical record at each encounter. I have evaluated and examined the patient. 11:37 AM       History was obtained from patient. I was asked by Mercy Burgess MD to see Jesenia Arreguin in consultation for a chief complaint of hypoxia. Mr. Margarette Camacho is a pleasant 48year old male who presented to the [facility] ED with worsening SOB. Patient had recent hernia repair last month resulting from complication following prior bowel resection (due to diverticulitis) last year. Reports back pain after surgery. Over the weekend, her was stretching out in a swivel chair to help his back pain and tumbled backwards, falling out of the chair which results in a rib fracture per his PCP. Since that time, he has had difficulty coughing up sputum, worsening chest congestion, and significant SOB. Reports \"smoker's cough\" at baseline, his cough the last few days has been much worse. He also reports significant pain when taking a deep breath. He denies hemoptysis. He denies history of lung disease at baseline. Denies using home O2, nebs, or inhalers. He does have history of DARYL and reports compliance with CPAP. Denies personal or family history of blood clots. CXR is personally visualized. There is cardiomegaly. Lungs are clear.     Patient unable to lie flat for chest CT    WBC 3.8  Hgb 15.2  Plt 70  LFTs mildly elevated  Lactic acid 1.0  D-dimer 0.4  ABG 7.40/37/58/22 on NRB      Past Medical History:   Diagnosis Date    Diverticula of colon     GERD (gastroesophageal reflux disease)     occasional    Hypercholesteremia     Microscopic hematuria     Sleep apnea        Past Surgical History: Procedure Laterality Date    HX APPENDECTOMY      HX HEENT 2008    left ear TM graft    HX HEENT  Sep 2009    left ear surgery - cholestoma    HX HEENT  Sep 2010    left ear TM graft revision    HX HEENT  Aug 1998    left ear tube placement    HX ORTHOPAEDIC      left knee A/S x 3    HX ORTHOPAEDIC      left knee ACL repair       Family History   Problem Relation Age of Onset    Heart Disease Father     Hypertension Sister        Social History   Substance Use Topics    Smoking status: Current Every Day Smoker     Packs/day: 1.00     Years: 27.00    Smokeless tobacco: Never Used    Alcohol use 1.0 oz/week     1 Cans of beer, 1 Standard drinks or equivalent per week      Comment: 3x/ wk       Allergies   Allergen Reactions    Wellbutrin [Bupropion] Swelling     Angioedema      Adhesive Tape-Silicones Contact Dermatitis     Pt reports he can tolerate paper tape.  Azithromycin Hives    Cephalexin Hives     Pt has taken Augmentin before and tolerated it per rn    Hydromorphone Itching and Nausea Only    Ketamine Hives and Swelling       Current Facility-Administered Medications   Medication Dose Route Frequency    lidocaine (LIDODERM) 5 % patch 1 Patch  1 Patch TransDERmal Q24H    . PHARMACY TO SUBSTITUTE PER PROTOCOL    Per Protocol    omeprazole (PRILOSEC) capsule 20 mg  20 mg Oral EVERY OTHER DAY    rosuvastatin (CRESTOR) tablet 10 mg  10 mg Oral DAILY    naloxone (NARCAN) injection 0.4 mg  0.4 mg IntraVENous PRN    0.9% sodium chloride infusion  50 mL/hr IntraVENous CONTINUOUS    acetaminophen (TYLENOL) tablet 650 mg  650 mg Oral Q4H PRN    HYDROcodone-acetaminophen (NORCO) 5-325 mg per tablet 1 Tab  1 Tab Oral Q4H PRN    diphenhydrAMINE (BENADRYL) capsule 25 mg  25 mg Oral Q4H PRN    ondansetron (ZOFRAN) injection 4 mg  4 mg IntraVENous Q4H PRN    iopamidol (ISOVUE 300) 61 % contrast injection 100 mL  100 mL IntraVENous RAD ONCE    enoxaparin (LOVENOX) injection 100 mg  100 mg SubCUTAneous Q12H    HYDROmorphone (DILAUDID) tablet 2 mg  2 mg Oral Q4H PRN    diphenhydrAMINE (BENADRYL) capsule 25 mg  25 mg Oral Q6H PRN     Current Outpatient Prescriptions   Medication Sig    HYDROcodone-acetaminophen (XODOL) 5-300 mg tablet Take 1 Tab by mouth every four (4) hours as needed.  cetirizine (ZYRTEC) 10 mg tablet Take 10 mg by mouth daily. In the morning.  fenofibrate (LOFIBRA) 160 mg tablet Take 160 mg by mouth daily. In the morning.  rosuvastatin (CRESTOR) 10 mg tablet Take 10 mg by mouth daily. In the morning.  omeprazole (PRILOSEC) 20 mg capsule Take 20 mg by mouth every other day. In the morning.  cholecalciferol, vitamin D3, 2,000 unit tab Take 4,000 Units by mouth daily. In the morning.  MULTIVIT-MINERALS/FOLIC ACID (DAILY GUMMIES PO) Take 2 Tabs by mouth daily. REVIEW OF SYSTEMS   Negative except as stated in the HPI. Physical Exam:   Visit Vitals    BP (!) 145/92 (BP 1 Location: Right arm, BP Patient Position: Head of bed elevated (Comment degrees); Sitting)    Pulse 76    Temp 98.1 °F (36.7 °C)    Resp 18    Ht 5' 7\" (1.702 m)    Wt 93.9 kg (207 lb 0.2 oz)    SpO2 91%    BMI 32.42 kg/m2   on 5L    General:  Alert, cooperative, + resp distress, appears stated age. Head:  Normocephalic, without obvious abnormality, atraumatic. Eyes:  Conjunctivae/corneas clear. Nose: Nares normal. Septum midline. Mucosa normal.    Throat: Lips, mucosa, and tongue normal.    Neck: Supple, symmetrical, trachea midline, no adenopathy. Lungs:   Diminished inspiratory effort and splinting with diffuse upper air way rhonchi. Chest wall:  Tenderness to palpation laterally on the left. Heart:  Regular rate and rhythm, S1, S2 normal, no murmur, click, rub or gallop. Abdomen:   Soft, non-tender. Bowel sounds normal. No masses,  No organomegaly. Extremities: Extremities normal, atraumatic, no cyanosis or edema. Pulses: 2+ and symmetric all extremities. Skin: Skin color, texture, turgor normal. No rashes or lesions   Lymph nodes: Cervical, supraclavicular nodes normal.   Neurologic: Grossly nonfocal       Lab Results   Component Value Date/Time    Sodium 137 03/15/2017 07:56 AM    Potassium 4.3 03/15/2017 07:56 AM    Chloride 102 03/15/2017 07:56 AM    CO2 22 03/15/2017 07:56 AM    BUN 12 03/15/2017 07:56 AM    Creatinine 0.89 03/15/2017 07:56 AM    Glucose 123 03/15/2017 07:56 AM    Calcium 9.4 03/15/2017 07:56 AM    Magnesium 1.6 06/20/2016 12:29 AM    Phosphorus 3.9 06/20/2016 12:29 AM    Lactic acid 1.0 03/15/2017 07:56 AM       Lab Results   Component Value Date/Time    WBC 3.8 03/15/2017 07:56 AM    HGB 15.2 03/15/2017 07:56 AM    PLATELET 70 03/65/1149 07:56 AM    MCV 99.1 03/15/2017 07:56 AM       Lab Results   Component Value Date/Time    INR 1.0 06/16/2016 12:21 PM    AST (SGOT) 76 03/15/2017 07:56 AM    Alk.  phosphatase 60 03/15/2017 07:56 AM    Protein, total 8.2 03/15/2017 07:56 AM    Albumin 4.5 03/15/2017 07:56 AM    Globulin 3.7 03/15/2017 07:56 AM       No results found for: IRON, FE, TIBC, IBCT, PSAT, FERR    No results found for: SR, CRP, KIMBERLY, ANAIGG, RA, RPR, RPRT, VDRLT, VDRLS, TSH, TSHEXT     Lab Results   Component Value Date/Time    PH 7.40 03/15/2017 10:00 AM    PCO2 37 03/15/2017 10:00 AM    PO2 58 (L) 03/15/2017 10:00 AM    HCO3 22 03/15/2017 10:00 AM       No results found for: CPK, RCK1, RCK2, RCK3, RCK4, CKNDX, CKND1, TROPT, TROIQ, BNPP, BNP     No results found for: CULT    No results found for: TOXA1, RPR, HBCM, HBSAG, HAAB, HCAB, HCAB1, HAAT, G6PD, CRYAC, HIVGT, HIVR, HIV1, HIV12, HIVPC, HIVRPI    No results found for: VANCT, CPK    Lab Results   Component Value Date/Time    Color DARK YELLOW 05/31/2016 11:51 AM    Appearance CLEAR 05/31/2016 11:51 AM    pH (UA) 6.0 05/31/2016 11:51 AM    Protein NEGATIVE  05/31/2016 11:51 AM    Glucose NEGATIVE  05/31/2016 11:51 AM    Ketone NEGATIVE  05/31/2016 11:51 AM    Bilirubin NEGATIVE 05/31/2016 11:51 AM    Blood MODERATE 05/31/2016 11:51 AM    Urobilinogen 0.2 05/31/2016 11:51 AM    Nitrites NEGATIVE  05/31/2016 11:51 AM    Leukocyte Esterase NEGATIVE  05/31/2016 11:51 AM    WBC 0-4 05/31/2016 11:51 AM    RBC 10-20 05/31/2016 11:51 AM    Bacteria NEGATIVE  05/31/2016 11:51 AM       IMPRESSION  · Acute hypoxic respiratory failure  · Severe dyspnea  · Significant chest pain secondary to left rib fractures s/p fall (POA)  · Thrombocytopenia  · GERD  · DARYL  · Tobacco use    PLAN  · Continue O2 to keep sats > 90%  · Stat LE dopplers pending  · Start Dilaudid for better pain control as hydrocodone/apap and morphine have not controled his chest pain. He reports itching with Dilaudid, but has taken it with Benadryl in the past following surgery and did fine. · Check troponin  · Hopefully he can tolerate chest CT when his pain control is better. · Consider ECHO if no cause for hypoxia noted on chest CT  · Ortho consult pending  · IS as tolerated and encourage pulmonary toliet; add on Mucinex  · Patient refusing DVT prophylaxis with Lovenox, but agrees to SCDs  · GI prophylaxis: Protonix      Thank you for allowing us to participate in the care of this patient. We will be happy to follow along in his progress with you.     Mario Alberto Adler

## 2017-03-15 NOTE — PROGRESS NOTES
I introduced myself to the patient and completed the initial case management assessment. Patient verbally confirmed address, health insurance information and PCP information. He lives at home with his wife and youngest son. There are 3 steps to get into the patients home. His pharmacy preference is the [address] in [facility] on [address]. He has had [facility] home health care services in the past and would like to use these company if home health services are needed at discharge. He has not needed home oxygen in the past.  The only DME he reported having at home is crutches. Patient did not have any questions at the time of assessment. Case management will continue to follow for discharge needs. Care Management Interventions  PCP Verified by CM:  Yes (Dr. Suma Nice)  Last Visit to PCP: 03/13/17  Transition of Care Consult (CM Consult): Discharge Planning  Current Support Network: Lives with Spouse  Confirm Follow Up Transport: Family Juanita Geller RN

## 2017-03-15 NOTE — PROGRESS NOTES
1130 Pt admitted from ED to SD status, bed 4. A&O x4. Rhonchi throughout A&P. Nonproductive congested, weak cough. Sat 93%. Changed to 02 5L/NC. Sat 91%. RR 17  even, unlabored. C/o Lt posterior upper back pain due to hairline 6th rib fx from a fall, rates 10, unrelieved with Morphine Sulfate, IV. Gayle Owenss a bedside, aware of pt's uncontrolled pain. Has been taking Hydrocodone at home and received Morphine Sulfate IV at hospital. Remains unchanged. Pt stated Dilaudid made him itch but taken with Benadryl seems to prevent itching. Orders received. Instructed how to use IS q 1hr x10 and importance of TCDB. Demonstrated 1000ml x10. ABD binder in place on abd for hernia repair when up. Released while in bed. Wife at bedside. Instructed both wife and pt not to get oob without nurse assistance. Bed alarm on. Verbalized understanding. Yellow socks, fall band on pt. Skin assessed with Lynnette Mckeon RN. Abrasion to Rt hand, old incision to RLQ from abd surgery, erythema to buttocks, blanches. See assessment. 1203 Medicated with Dilaudid po with Benadryl po for Lt posterior back pain, rates 10, sharp, especially taking deep breaths. 1300 Pt states pain is much better, tolerable. Rates 7.   1400 Pain now a 5.   1600 Pt reassessed. Assessment unchanged. Lt posterior back pain sharp, rates 9, will medicate with Dilaudid po. Pt has not voided today. Bladder scanned 29ml. 1750 Bilateral LE doppler complete. 1815 Voided 300ml dark gary urine. Urine collected, sent to lab.   1900 Bedside and Verbal shift change report given to Thanh Pacheco RN (oncoming nurse) by Annette Sosa RN (offgoing nurse). Report included the following information SBAR, Kardex, ED Summary, Intake/Output, MAR, Recent Results and Cardiac Rhythm sr.

## 2017-03-15 NOTE — ED PROVIDER NOTES
HPI Comments: 48 y.o. male with past medical history significant for hypercholesteremia, diverticulitis, GERD, sleep apnea, microscopic hematuria, nausea and vomiting who presents ambulatory from home accompanied by his wife with chief complaint of SOB. Pt states he fell 3 days ago, fracturing a rib in his left side per pt's PCP. Pt started with cough and SOB this morning. Pt has been taking hydrocodone for the rib fracture. Pt denies taking blood thinners. Pt denies fever. There are no other acute medical concerns at this time. Social hx: current every day tobacco smoker; uses EtOH; denies illicit drug use;   PCP: Dina Nettles MD    Note written by Ray Sinha, as dictated by Kiara Lam MD 7:32 AM    The history is provided by the patient. Past Medical History:   Diagnosis Date    Adverse effect of anesthesia     breaks out in hives 11 days after surgery    GERD (gastroesophageal reflux disease)     occasional    Ill-defined condition     diverticulitis    Ill-defined condition     high cholesterol    Microscopic hematuria     Nausea & vomiting     patient stated that he vomited as soon as he was administered IV med for anesthesia. Occured twice.  Sleep apnea        Past Surgical History:   Procedure Laterality Date    HX APPENDECTOMY      HX HEENT  2008    left ear TM graft    HX HEENT  Sep 2009    left ear surgery - cholestoma    HX HEENT  Sep 2010    left ear TM graft revision    HX HEENT  Aug 1998    left ear tube placement    HX ORTHOPAEDIC      left knee A/S x 3    HX ORTHOPAEDIC      left knee ACL repair         Family History:   Problem Relation Age of Onset    Heart Disease Father     Hypertension Sister        Social History     Social History    Marital status:      Spouse name: N/A    Number of children: N/A    Years of education: N/A     Occupational History    Not on file.      Social History Main Topics    Smoking status: Current Every Day Smoker     Packs/day: 1.00     Years: 27.00    Smokeless tobacco: Never Used    Alcohol use 1.0 oz/week     1 Cans of beer, 1 Standard drinks or equivalent per week      Comment: 3x/ wk    Drug use: No    Sexual activity: Not on file     Other Topics Concern    Not on file     Social History Narrative         ALLERGIES: Wellbutrin [bupropion]; Adhesive tape-silicones; Azithromycin; Cephalexin; Hydromorphone; and Ketamine    Review of Systems   Constitutional: Negative for chills, diaphoresis, fatigue and fever. HENT: Negative for congestion, rhinorrhea, sinus pressure, sore throat, trouble swallowing and voice change. Eyes: Negative for photophobia and visual disturbance. Respiratory: Positive for cough and shortness of breath. Negative for chest tightness. Cardiovascular: Negative for chest pain, palpitations and leg swelling. Gastrointestinal: Negative for abdominal pain, blood in stool, constipation, diarrhea, nausea and vomiting. Musculoskeletal: Negative for arthralgias, myalgias and neck pain. Rib pain   Neurological: Negative for dizziness, weakness, light-headedness, numbness and headaches. All other systems reviewed and are negative. There were no vitals filed for this visit. Physical Exam   Constitutional: He is oriented to person, place, and time. He appears well-developed and well-nourished. No distress. HENT:   Head: Normocephalic and atraumatic. Nose: Nose normal.   Mouth/Throat: Oropharynx is clear and moist. No oropharyngeal exudate. Eyes: Conjunctivae and EOM are normal. Right eye exhibits no discharge. Left eye exhibits no discharge. No scleral icterus. Neck: Normal range of motion. Neck supple. No JVD present. No tracheal deviation present. No thyromegaly present. Cardiovascular: Normal rate, regular rhythm, normal heart sounds and intact distal pulses. Exam reveals no gallop and no friction rub. No murmur heard.   Pulmonary/Chest: Effort normal. No stridor. No respiratory distress. He has decreased breath sounds in the left upper field, the left middle field and the left lower field. He has no wheezes. He has rales (diffuse) in the left upper field, the left middle field and the left lower field. He exhibits tenderness (left lateral chest wall). Hypoxic on room air   Abdominal: Bowel sounds are normal. He exhibits no distension and no mass. There is no tenderness. There is no rebound. Musculoskeletal: Normal range of motion. He exhibits no edema or tenderness. Lymphadenopathy:     He has no cervical adenopathy. Neurological: He is alert and oriented to person, place, and time. No cranial nerve deficit. Coordination normal.   Skin: Skin is warm. No rash noted. He is diaphoretic. No erythema. No pallor. Psychiatric: He has a normal mood and affect. His behavior is normal. Judgment and thought content normal.   Nursing note and vitals reviewed. Note written by Ray Galvez, as dictated by Benny John MD 7:32 AM    MDM  Number of Diagnoses or Management Options  Hypoxia:   Diagnosis management comments: PNA, hypoxia, atelectasis. 49 y/o male with recent rib fx noted to be hypoxic on exam.  Concern for PNA. Will eval with CXR, cbc, cmp, blood culture, lacate, pain control. Pt to be admitted. Amount and/or Complexity of Data Reviewed  Clinical lab tests: ordered and reviewed  Tests in the radiology section of CPT®: ordered and reviewed    Risk of Complications, Morbidity, and/or Mortality  Presenting problems: moderate  Diagnostic procedures: moderate  Management options: moderate    Critical Care  Total time providing critical care: 30-74 minutes (Total critical care time spend exclusive of procedures:  35 min.)    Patient Progress  Patient progress: stable    ED Course       Procedures  PROGRESS NOTE:  9:07AM  Updated pt on x-ray results. Pt complains of pain. Will consult hospitalist for admission.     CONSULT NOTE:  9:28 Vira Cintron MD spoke with Dr. Pam Tabares, Consult for Hospitalist.  Discussed available diagnostic tests and clinical findings. He is in agreement with care plans as outlined. Dr. Pam Tabares recommends admitting the patient.

## 2017-03-15 NOTE — PROGRESS NOTES
1915 Received report. 2032 Dilaudid 2 mg po given for left back pain , benadryl 25 mg po given for itching. 2144 Norco 5-325 mg 1 po given for left back pain. 0100 Patient resting.  0311 Dilaudid 2 mg po , benadryl 25 mg po given for left back pain. 0710 Bedside shift change report given to [address] [place] [address]. Report included the following information SBAR, Kardex, Intake/Output, MAR, Accordion, Recent Results, Med Rec Status, Cardiac Rhythm SR and Alarm Parameters .

## 2017-03-15 NOTE — ED TRIAGE NOTES
On Sunday pt was leaning back in a chair and fell striking his left side, seen at pcp and was told that he has a left rib fx. Began with shortness of breath, wet lung sounds with coughing noted.

## 2017-03-16 ENCOUNTER — APPOINTMENT (OUTPATIENT)
Dept: CT IMAGING | Age: 47
DRG: 189 | End: 2017-03-16
Attending: INTERNAL MEDICINE
Payer: COMMERCIAL

## 2017-03-16 ENCOUNTER — APPOINTMENT (OUTPATIENT)
Dept: GENERAL RADIOLOGY | Age: 47
DRG: 189 | End: 2017-03-16
Attending: INTERNAL MEDICINE
Payer: COMMERCIAL

## 2017-03-16 LAB
ANION GAP BLD CALC-SCNC: 9 MMOL/L (ref 5–15)
BACTERIA SPEC CULT: NORMAL
BACTERIA SPEC CULT: NORMAL
BUN SERPL-MCNC: 17 MG/DL (ref 6–20)
BUN/CREAT SERPL: 16 (ref 12–20)
CALCIUM SERPL-MCNC: 8.9 MG/DL (ref 8.5–10.1)
CHLORIDE SERPL-SCNC: 102 MMOL/L (ref 97–108)
CO2 SERPL-SCNC: 26 MMOL/L (ref 21–32)
CREAT SERPL-MCNC: 1.08 MG/DL (ref 0.7–1.3)
ERYTHROCYTE [DISTWIDTH] IN BLOOD BY AUTOMATED COUNT: 12.7 % (ref 11.5–14.5)
GLUCOSE SERPL-MCNC: 120 MG/DL (ref 65–100)
HCT VFR BLD AUTO: 36.8 % (ref 36.6–50.3)
HGB BLD-MCNC: 12.9 G/DL (ref 12.1–17)
MAGNESIUM SERPL-MCNC: 2 MG/DL (ref 1.6–2.4)
MCH RBC QN AUTO: 34.6 PG (ref 26–34)
MCHC RBC AUTO-ENTMCNC: 35.1 G/DL (ref 30–36.5)
MCV RBC AUTO: 98.7 FL (ref 80–99)
PLATELET # BLD AUTO: 151 K/UL (ref 150–400)
POTASSIUM SERPL-SCNC: 3.5 MMOL/L (ref 3.5–5.1)
RBC # BLD AUTO: 3.73 M/UL (ref 4.1–5.7)
SERVICE CMNT-IMP: NORMAL
SODIUM SERPL-SCNC: 137 MMOL/L (ref 136–145)
WBC # BLD AUTO: 4.5 K/UL (ref 4.1–11.1)

## 2017-03-16 PROCEDURE — 74011250636 HC RX REV CODE- 250/636: Performed by: INTERNAL MEDICINE

## 2017-03-16 PROCEDURE — 51798 US URINE CAPACITY MEASURE: CPT

## 2017-03-16 PROCEDURE — 77010033678 HC OXYGEN DAILY

## 2017-03-16 PROCEDURE — 74011636320 HC RX REV CODE- 636/320: Performed by: RADIOLOGY

## 2017-03-16 PROCEDURE — 36415 COLL VENOUS BLD VENIPUNCTURE: CPT | Performed by: INTERNAL MEDICINE

## 2017-03-16 PROCEDURE — 65660000000 HC RM CCU STEPDOWN

## 2017-03-16 PROCEDURE — 74011250637 HC RX REV CODE- 250/637: Performed by: PHYSICIAN ASSISTANT

## 2017-03-16 PROCEDURE — 83735 ASSAY OF MAGNESIUM: CPT | Performed by: INTERNAL MEDICINE

## 2017-03-16 PROCEDURE — 74011250637 HC RX REV CODE- 250/637: Performed by: INTERNAL MEDICINE

## 2017-03-16 PROCEDURE — 80048 BASIC METABOLIC PNL TOTAL CA: CPT | Performed by: INTERNAL MEDICINE

## 2017-03-16 PROCEDURE — 71260 CT THORAX DX C+: CPT

## 2017-03-16 PROCEDURE — 74011250637 HC RX REV CODE- 250/637: Performed by: STUDENT IN AN ORGANIZED HEALTH CARE EDUCATION/TRAINING PROGRAM

## 2017-03-16 PROCEDURE — 77030033269 HC SLV COMPR SCD KNE2 CARD -B

## 2017-03-16 PROCEDURE — 85027 COMPLETE CBC AUTOMATED: CPT | Performed by: INTERNAL MEDICINE

## 2017-03-16 RX ORDER — DOCUSATE SODIUM 100 MG/1
100 CAPSULE, LIQUID FILLED ORAL 2 TIMES DAILY
Status: DISCONTINUED | OUTPATIENT
Start: 2017-03-16 | End: 2017-03-17

## 2017-03-16 RX ORDER — POLYETHYLENE GLYCOL 3350 17 G/17G
17 POWDER, FOR SOLUTION ORAL DAILY
Status: DISCONTINUED | OUTPATIENT
Start: 2017-03-16 | End: 2017-03-24 | Stop reason: HOSPADM

## 2017-03-16 RX ORDER — DIPHENHYDRAMINE HYDROCHLORIDE 50 MG/ML
25 INJECTION, SOLUTION INTRAMUSCULAR; INTRAVENOUS
Status: DISCONTINUED | OUTPATIENT
Start: 2017-03-16 | End: 2017-03-24 | Stop reason: HOSPADM

## 2017-03-16 RX ORDER — HYDROMORPHONE HYDROCHLORIDE 1 MG/ML
1 INJECTION, SOLUTION INTRAMUSCULAR; INTRAVENOUS; SUBCUTANEOUS
Status: DISCONTINUED | OUTPATIENT
Start: 2017-03-16 | End: 2017-03-16

## 2017-03-16 RX ORDER — HYDROMORPHONE HYDROCHLORIDE 1 MG/ML
2 INJECTION, SOLUTION INTRAMUSCULAR; INTRAVENOUS; SUBCUTANEOUS
Status: DISCONTINUED | OUTPATIENT
Start: 2017-03-16 | End: 2017-03-17

## 2017-03-16 RX ORDER — HYDROMORPHONE HYDROCHLORIDE 1 MG/ML
1 INJECTION, SOLUTION INTRAMUSCULAR; INTRAVENOUS; SUBCUTANEOUS ONCE
Status: COMPLETED | OUTPATIENT
Start: 2017-03-16 | End: 2017-03-16

## 2017-03-16 RX ADMIN — POLYETHYLENE GLYCOL 3350 17 G: 17 POWDER, FOR SOLUTION ORAL at 14:33

## 2017-03-16 RX ADMIN — SODIUM CHLORIDE 50 ML/HR: 900 INJECTION, SOLUTION INTRAVENOUS at 06:42

## 2017-03-16 RX ADMIN — GUAIFENESIN 600 MG: 600 TABLET, EXTENDED RELEASE ORAL at 21:06

## 2017-03-16 RX ADMIN — HYDROMORPHONE HYDROCHLORIDE 2 MG: 1 INJECTION, SOLUTION INTRAMUSCULAR; INTRAVENOUS; SUBCUTANEOUS at 13:26

## 2017-03-16 RX ADMIN — DIPHENHYDRAMINE HYDROCHLORIDE 25 MG: 50 INJECTION, SOLUTION INTRAMUSCULAR; INTRAVENOUS at 09:32

## 2017-03-16 RX ADMIN — DIPHENHYDRAMINE HYDROCHLORIDE 25 MG: 25 CAPSULE ORAL at 03:11

## 2017-03-16 RX ADMIN — ONDANSETRON 4 MG: 2 INJECTION INTRAMUSCULAR; INTRAVENOUS at 19:32

## 2017-03-16 RX ADMIN — DOCUSATE SODIUM 100 MG: 100 CAPSULE ORAL at 14:36

## 2017-03-16 RX ADMIN — HYDROMORPHONE HYDROCHLORIDE 2 MG: 2 TABLET ORAL at 09:06

## 2017-03-16 RX ADMIN — ROSUVASTATIN CALCIUM 10 MG: 10 TABLET, FILM COATED ORAL at 09:07

## 2017-03-16 RX ADMIN — IOPAMIDOL 100 ML: 612 INJECTION, SOLUTION INTRAVENOUS at 14:00

## 2017-03-16 RX ADMIN — HYDROMORPHONE HYDROCHLORIDE 1 MG: 1 INJECTION, SOLUTION INTRAMUSCULAR; INTRAVENOUS; SUBCUTANEOUS at 10:48

## 2017-03-16 RX ADMIN — SODIUM CHLORIDE 50 ML/HR: 900 INJECTION, SOLUTION INTRAVENOUS at 23:52

## 2017-03-16 RX ADMIN — DIPHENHYDRAMINE HYDROCHLORIDE 25 MG: 50 INJECTION, SOLUTION INTRAMUSCULAR; INTRAVENOUS at 13:26

## 2017-03-16 RX ADMIN — DIPHENHYDRAMINE HYDROCHLORIDE 25 MG: 50 INJECTION, SOLUTION INTRAMUSCULAR; INTRAVENOUS at 18:56

## 2017-03-16 RX ADMIN — GUAIFENESIN 600 MG: 600 TABLET, EXTENDED RELEASE ORAL at 09:07

## 2017-03-16 RX ADMIN — HYDROMORPHONE HYDROCHLORIDE 2 MG: 1 INJECTION, SOLUTION INTRAMUSCULAR; INTRAVENOUS; SUBCUTANEOUS at 18:56

## 2017-03-16 RX ADMIN — DIPHENHYDRAMINE HYDROCHLORIDE 25 MG: 50 INJECTION, SOLUTION INTRAMUSCULAR; INTRAVENOUS at 23:47

## 2017-03-16 RX ADMIN — HYDROCODONE BITARTRATE AND ACETAMINOPHEN 1 TABLET: 5; 325 TABLET ORAL at 21:06

## 2017-03-16 RX ADMIN — HYDROMORPHONE HYDROCHLORIDE 2 MG: 2 TABLET ORAL at 03:11

## 2017-03-16 RX ADMIN — FENOFIBRATE 145 MG: 145 TABLET ORAL at 09:07

## 2017-03-16 RX ADMIN — HYDROMORPHONE HYDROCHLORIDE 1 MG: 1 INJECTION, SOLUTION INTRAMUSCULAR; INTRAVENOUS; SUBCUTANEOUS at 09:32

## 2017-03-16 RX ADMIN — HYDROMORPHONE HYDROCHLORIDE 2 MG: 1 INJECTION, SOLUTION INTRAMUSCULAR; INTRAVENOUS; SUBCUTANEOUS at 23:34

## 2017-03-16 NOTE — PROGRESS NOTES
I met with pt and his wife as a follow-up visit. Pt.'s chief focus is pain management. Please consider a palliative medicine consult to address pain management issues. When discussing home health needs,pt wanted to know if a home health nurse could come inject his back weekly with pain medication. At home,pt ambulates with a walker. Pt states he has been up in his room with his nurse without difficulty. Wife states pt was taking narcotics @ home. UDS was positive for THC and opiates. At this time,there are no identified home health,SNF,or inpatient rehab needs. PCP is Dr Sera Menezes. Thank you.   Jean Valverde

## 2017-03-16 NOTE — CONSULTS
Discussed case with Dr. Maribell Ramos, his concern was for multiple severe rib fractures causing hypoxia. I recommended CT surgery as Ortho surgery does not see rib fx's.

## 2017-03-16 NOTE — PROGRESS NOTES
Name: Gerda Chamber: [place]   : 1970 Admit Date: 3/15/2017   Phone:   Room: 7774/01   PCP: Juan C Jones MD  MRN: [id number]   Date: 3/16/2017  Code: Full Code          Chart and notes reviewed. Data reviewed. I review the patient's current medications in the medical record at each encounter. I have evaluated and examined the patient. Overnight events  Afebrile  Sats 92% on CPAP and 7L bled in  UDS positive for opiates and THC  ETOH < 10  BLE VD negative for DVT  Trop negative    ROS: Feels about the same this morning. Dilaudid is providing a little better pain control and he thinks he will now be able to lie flat for chest CT. Still with nonproductive cough and chest congestions. Still desats quickly off O2 and has significant DE LA TORRE. Denies abd pain. Denies CP. Denies LE pain/swelling. Current Facility-Administered Medications   Medication Dose Route Frequency    lidocaine (LIDODERM) 5 % patch 1 Patch  1 Patch TransDERmal Q24H    fenofibrate nanocrystallized (TRICOR) tablet 145 mg  145 mg Oral DAILY    pantoprazole (PROTONIX) tablet 40 mg  40 mg Oral EVERY OTHER DAY    rosuvastatin (CRESTOR) tablet 10 mg  10 mg Oral DAILY    naloxone (NARCAN) injection 0.4 mg  0.4 mg IntraVENous PRN    0.9% sodium chloride infusion  50 mL/hr IntraVENous CONTINUOUS    acetaminophen (TYLENOL) tablet 650 mg  650 mg Oral Q4H PRN    HYDROcodone-acetaminophen (NORCO) 5-325 mg per tablet 1 Tab  1 Tab Oral Q4H PRN    diphenhydrAMINE (BENADRYL) capsule 25 mg  25 mg Oral Q4H PRN    ondansetron (ZOFRAN) injection 4 mg  4 mg IntraVENous Q4H PRN    HYDROmorphone (DILAUDID) tablet 2 mg  2 mg Oral Q4H PRN    guaiFENesin ER (MUCINEX) tablet 600 mg  600 mg Oral Q12H         REVIEW OF SYSTEMS   Negative except as stated in the HPI.       Physical Exam:   Visit Vitals    /65    Pulse 65    Temp 97.6 °F (36.4 °C)    Resp 15    Ht 5' 7\" (1.702 m)    Wt 95.8 kg (211 lb 3.2 oz)  SpO2 92%    BMI 33.08 kg/m2       General:  Alert, cooperative, + resp distress, appears stated age. Head:  Normocephalic, without obvious abnormality, atraumatic. Eyes:  Conjunctivae/corneas clear. Nose: Nares normal. Septum midline. Mucosa normal.    Throat: Lips, mucosa, and tongue normal.    Neck: Supple, symmetrical, trachea midline, no adenopathy. Lungs:   Diminished inspiratory effort and splinting with coarse breath sounds and upper air way rhonchi bilaterally. Chest wall:  Tenderness to palpation laterally on the left. Heart:  Regular rate and rhythm, S1, S2 normal, no murmur, click, rub or gallop. Abdomen:   Soft, non-tender. Bowel sounds normal. No masses,  No organomegaly. Extremities: Extremities normal, atraumatic, no cyanosis or edema. Pulses: 2+ and symmetric all extremities. Skin: Skin color, texture, turgor normal. No rashes or lesions   Lymph nodes: Cervical, supraclavicular nodes normal.   Neurologic: Grossly nonfocal       Lab Results   Component Value Date/Time    Sodium 137 03/16/2017 03:48 AM    Potassium 3.5 03/16/2017 03:48 AM    Chloride 102 03/16/2017 03:48 AM    CO2 26 03/16/2017 03:48 AM    BUN 17 03/16/2017 03:48 AM    Creatinine 1.08 03/16/2017 03:48 AM    Glucose 120 03/16/2017 03:48 AM    Calcium 8.9 03/16/2017 03:48 AM    Magnesium 2.0 03/16/2017 03:48 AM    Phosphorus 3.9 06/20/2016 12:29 AM    Lactic acid 1.0 03/15/2017 07:56 AM       Lab Results   Component Value Date/Time    WBC 4.5 03/16/2017 03:48 AM    HGB 12.9 03/16/2017 03:48 AM    PLATELET 372 80/00/4382 03:48 AM    MCV 98.7 03/16/2017 03:48 AM       Lab Results   Component Value Date/Time    INR 1.0 06/16/2016 12:21 PM    AST (SGOT) 76 03/15/2017 07:56 AM    Alk.  phosphatase 60 03/15/2017 07:56 AM    Protein, total 8.2 03/15/2017 07:56 AM    Albumin 4.5 03/15/2017 07:56 AM    Globulin 3.7 03/15/2017 07:56 AM       No results found for: IRON, FE, TIBC, IBCT, PSAT, FERR    No results found for: SR, CRP, KIMBERLY, ANAIGG, RA, RPR, RPRT, VDRLT, VDRLS, TSH, TSHEXT, TSHEXT     Lab Results   Component Value Date/Time    PH 7.40 03/15/2017 10:00 AM    PCO2 37 03/15/2017 10:00 AM    PO2 58 (L) 03/15/2017 10:00 AM    HCO3 22 03/15/2017 10:00 AM       Lab Results   Component Value Date/Time    Troponin-I, Qt. <0.04 03/15/2017 10:53 AM        Lab Results   Component Value Date/Time    Culture result: MRSA NOT PRESENT  AT 17 HOURS   03/15/2017 11:47 AM       No results found for: TOXA1, RPR, HBCM, HBSAG, HAAB, HCAB, HCAB1, HAAT, G6PD, CRYAC, HIVGT, HIVR, HIV1, HIV12, HIVPC, HIVRPI    No results found for: VANCT, CPK    Lab Results   Component Value Date/Time    Color DARK YELLOW 03/15/2017 06:22 PM    Appearance CLEAR 03/15/2017 06:22 PM    Specific gravity >1.030 03/15/2017 06:22 PM    pH (UA) 5.5 03/15/2017 06:22 PM    Protein 30 03/15/2017 06:22 PM    Glucose NEGATIVE  03/15/2017 06:22 PM    Ketone NEGATIVE  03/15/2017 06:22 PM    Bilirubin NEGATIVE  05/31/2016 11:51 AM    Blood NEGATIVE  03/15/2017 06:22 PM    Urobilinogen 0.2 03/15/2017 06:22 PM    Nitrites NEGATIVE  03/15/2017 06:22 PM    Leukocyte Esterase NEGATIVE  03/15/2017 06:22 PM    WBC 0-4 03/15/2017 06:22 PM    RBC 0-5 03/15/2017 06:22 PM    Bacteria 1+ 03/15/2017 06:22 PM       Images: no new images this morning    IMPRESSION  · Acute hypoxic respiratory failure  · Severe dyspnea  · Significant chest pain secondary to left rib fractures s/p fall (POA)  · Thrombocytopenia  · GERD  · DARYL  · Tobacco use    PLAN  · Continue O2 to keep sats > 90%  · CPAP nightly and with naps  · Continue Dilaudid for pain control as hydrocodone/apap and morphine have not adequately controlled his chest pain. He reports itching with Dilaudid, but has taken it with Benadryl in the past following surgery and did fine. · Hopefully he can tolerate chest CT this morning as his pain control is better.   · Consider ECHO if no cause for hypoxia noted on chest CT  · IS as tolerated and encourage pulmonary toilet  · Mucinex  · Patient refusing DVT prophylaxis with Lovenox, but agrees to SCDs  · GI prophylaxis: Protonix      Coralyn Shoulder, [place]

## 2017-03-16 NOTE — CDMP QUERY
Patient is noted to have a BMI of 33.08 kg/m2 ( 5'7\", 211 lbs ). Please clarify if this patient is:     =>Obese   =>Overweight  =>Other explanation of clinical findings  =>Unable to determine (no explanation for clinical findings)    Please clarify and document your clinical opinion in the progress notes and discharge summary, including the definitive and or presumptive diagnosis, (suspected or probable), related to the above clinical findings. Please include clinical findings supporting your diagnosis. REFERENCE: Per the [facility] Association, \"Individuals who are overweight, obese, or morbidly obese are at an increased risk for certain medical conditions when compared to persons of normal weight. Therefore, these conditions are always clinically significant and reportable. \"    Edijohana PikemanLECOM Health - [facility], [address] [facility]  [email or web address]

## 2017-03-16 NOTE — PROGRESS NOTES
Bedside and Verbal shift change report given to Sofie RN (oncoming nurse) by Celina RN (offgoing nurse). Report included the following information SBAR, Kardex, Procedure Summary, Intake/Output, MAR, Accordion, Recent Results, Cardiac Rhythm SR and Alarm Parameters . 0900:pt given po dilaudid right after taking meds pt started to cough, suctioned with yankour for clear secreations,  and o2 sats dropped to 87-88%, placed on 100% NRB mask Dr. Maira Soni aware and changed pt from po to IV dilaudid and iv benadryl  1000:pt remains on  100% NRB mask no distress, 02 sats mid 90's  Plan to get pain under control so pt is able to go for CT scan  1320: transport at bedside to take pt to CT scan and x-ray, after CT per radiology tech Radiologist looked at CT scan and said chest x-ray is not necessary  1415:pt back to room and replaced on monitor  Continued with IV dilaudid as needed for pain,  Changed pt to [facility] after CT scan and o2 sats remin mid 90's  1600:pt sleeping and appears comfortable  Bedside and Verbal shift change report given to Jia Licona 42 (oncoming nurse) by Domingo Biggs RN (offgoing nurse).  Report included the following information SBAR, Kardex, ED Summary, Procedure Summary, Intake/Output, MAR, Recent Results, Med Rec Status and Cardiac Rhythm SR.

## 2017-03-16 NOTE — PROGRESS NOTES
Daily Progress Note: 3/16/2017  Sowmya Garcia MD    Assessment/Plan:   Acute hypoxemic respiratory failure - POA, unclear etiology. Splinting? Narcotic suppression? Lung contusion? PE? Admit to stepdown, as he is on NRB and may deteriorate requiring BiPAP.      Sleep apnea - Resume CPAP if tolerated.     Rib fractures - POA, due to fall. CT to assess for lytic lesions. Pain control. Check drug and alcohol screen for fall risk assessment.     Hyperlipidemia - Continue rosuvastatin and fenofibrate     GERD (gastroesophageal reflux disease) - Continue PPI     Thrombocytopenia (POA):  resolved.      Tobacco abuse - Provide patch. Advised cessation. Spent 5 minutes in addition to all other time spent on admission, noted below. Problem List:  Problem List as of 3/16/2017  Date Reviewed: 3/15/2017          Codes Class Noted - Resolved    * (Principal)Acute hypoxemic respiratory failure ([facility] [address] ICD-10-CM: J96.01  ICD-9-CM: 518.81  3/15/2017 - Present        Sleep apnea ICD-10-CM: G47.30  ICD-9-CM: 780.57  Unknown - Present        Hypercholesteremia ICD-10-CM: E78.00  ICD-9-CM: 272.0  Unknown - Present        Rib fractures ICD-10-CM: S22.39XA  ICD-9-CM: 807.00  3/15/2017 - Present        Hyperlipidemia ICD-10-CM: E78.5  ICD-9-CM: 272.4  5/31/2016 - Present        GERD (gastroesophageal reflux disease) ICD-10-CM: K21.9  ICD-9-CM: 530.81  5/31/2016 - Present        RESOLVED: Diverticulitis of intestine with abscess ICD-10-CM: K57.80  ICD-9-CM: 562.11, 569.5  6/16/2016 - 3/15/2017        RESOLVED: Diverticulitis ICD-10-CM: K57.92  ICD-9-CM: 562.11  5/31/2016 - 3/15/2017        RESOLVED: Bladder wall thickening ICD-10-CM: Q53.60  ICD-9-CM: 596.89  5/31/2016 - 3/15/2017        RESOLVED: Hematuria ICD-10-CM: R31.9  ICD-9-CM: 599.70  5/31/2016 - 3/15/2017              Subjective:    48 y.o.  male who presented to the Emergency Department complaining of dyspnea.  It has progressed over 3 days since a fall in which he injured left ribs. He was placed on PO percocet. DE LA TORRE worsened over days, with cough, inability to clear his thin white sputum, and severe pleuritic pain. ER workup shows severe hypoxia, low platelets, but no signs of PNA. We will admit him for management. (Dr Gideon Sage)    3/16:  He still feels short of breath at rest.  Sats in mid 80s off his CPAP. Better than yesterday but still has some trouble with complete sentences. Still c/o rib pains. He reports any cough or deep breath is painful. D-dimer neg. CT chest pending. Review of Systems:   A comprehensive review of systems was negative except for that written in the HPI. Objective:   Physical Exam:     Visit Vitals    /65    Pulse 65    Temp 97.6 °F (36.4 °C)    Resp 15    Ht 5' 7\" (1.702 m)    Wt 95.8 kg (211 lb 3.2 oz)    SpO2 92%    BMI 33.08 kg/m2    O2 Flow Rate (L/min): 5 l/min O2 Device: CPAP nasal    Temp (24hrs), Av.9 °F (36.6 °C), Min:97.6 °F (36.4 °C), Max:98.3 °F (36.8 °C)        1901 -  0700  In: 1187.5 [P.O.:240; I.V.:947.5]  Out: 300 [Urine:300]    General:  Alert, cooperative, no distress, appears stated age. Head:  Normocephalic, without obvious abnormality, atraumatic. Eyes:  Conjunctivae/corneas clear. PERRL, EOMs intact. Nose:  No drainage or sinus tenderness. Throat: Lips, mucosa, and tongue moist..   Neck: Supple, symmetrical, trachea midline, no adenopathy, thyroid: no enlargement/tenderness/nodules, no carotid bruit and no JVD. No accessory muscle use   Back:   Symmetric, no curvature. ROM normal. No CVA tenderness. Lungs:   rhonchi bilaterally - no rales at this moment but would only take shallow breaths. .   Chest wall:   tenderness left ant ax line from mid chest down. No deformity. Heart:  Regular rate and rhythm, S1, S2 normal, no murmur, click, rub or gallop. Abdomen:   Soft, non-tender. Bowel sounds normal. No masses,  No organomegaly.    Extremities: no cyanosis or edema. No calf tenderness or cords. Pulses: 2+ and symmetric all extremities. Skin: Skin color, texture, turgor normal. No rashes or lesions   Neurologic: CNII-XII intact. Alert and oriented X 3. Fine motor of hands and fingers normal.   equal.  No cogwheeling or rigidity. Gait not tested at this time. Sensation grossly normal to touch. Gross motor of extremities normal.       Data Review:     Venous Duplex LE 3/15/17:  Bilateral lower extremity venous duplex negative for deep  venous thrombosis or thrombophlebitis    Results for Eduardo Delaney (MRN [id number]) as of 3/16/2017 08:50   Ref.  Range 3/15/2017 10:53 3/15/2017 18:22   AMPHETAMINE Latest Ref Range: NEG    NEGATIVE   BARBITURATES Latest Ref Range: NEG    NEGATIVE   BENZODIAZEPINE Latest Ref Range: NEG    NEGATIVE   COCAINE Latest Ref Range: NEG    NEGATIVE   METHADONE Latest Ref Range: NEG    NEGATIVE   OPIATES Latest Ref Range: NEG    POSITIVE (A)   PCP(PHENCYCLIDINE) Latest Ref Range: NEG    NEGATIVE   THC (TH-CANNABINOL) Latest Ref Range: NEG    POSITIVE (A)   ALCOHOL(ETHYL),SERUM Latest Ref Range: <10 MG/DL <10        Recent Days:  Recent Labs      03/16/17   0348  03/15/17   0756   WBC  4.5  3.8*   HGB  12.9  15.2   HCT  36.8  43.7   PLT  151  70*     Recent Labs      03/16/17   0348  03/15/17   0756   NA  137  137   K  3.5  4.3   CL  102  102   CO2  26  22   GLU  120*  123*   BUN  17  12   CREA  1.08  0.89   CA  8.9  9.4   MG  2.0   --    ALB   --   4.5   TBILI   --   0.7   SGOT   --   76*   ALT   --   86*     Recent Labs      03/15/17   1000   PH  7.40   PCO2  37   PO2  58*   HCO3  22       24 Hour Results:  Recent Results (from the past 24 hour(s))   BLOOD GAS, ARTERIAL    Collection Time: 03/15/17 10:00 AM   Result Value Ref Range    pH 7.40 7.35 - 7.45      PCO2 37 35.0 - 45.0 mmHg    PO2 58 (L) 80 - 100 mmHg    O2 SAT 91 (L) 92 - 97 %    BICARBONATE 22 22 - 26 mmol/L    BASE DEFICIT 2.0 mmol/L    O2 METHOD NR Sample source ARTERIAL      SITE LEFT RADIAL      SALLY'S TEST YES      Critical value read back MChristophe Garcia MD    ETHYL ALCOHOL    Collection Time: 03/15/17 10:53 AM   Result Value Ref Range    ALCOHOL(ETHYL),SERUM <10 <10 MG/DL   D DIMER    Collection Time: 03/15/17 10:53 AM   Result Value Ref Range    D-dimer 0.40 0.00 - 0.65 mg/L FEU   TROPONIN I    Collection Time: 03/15/17 10:53 AM   Result Value Ref Range    Troponin-I, Qt. <0.04 <0.05 ng/mL   CULTURE, MRSA    Collection Time: 03/15/17 11:47 AM   Result Value Ref Range    Special Requests: NO SPECIAL REQUESTS      Culture result: MRSA NOT PRESENT  AT 17 HOURS       DRUG SCREEN, URINE    Collection Time: 03/15/17  6:22 PM   Result Value Ref Range    AMPHETAMINE NEGATIVE  NEG      BARBITURATES NEGATIVE  NEG      BENZODIAZEPINE NEGATIVE  NEG      COCAINE NEGATIVE  NEG      METHADONE NEGATIVE  NEG      OPIATES POSITIVE (A) NEG      PCP(PHENCYCLIDINE) NEGATIVE  NEG      THC (TH-CANNABINOL) POSITIVE (A) NEG      Drug screen comment (NOTE)    URINALYSIS W/MICROSCOPIC    Collection Time: 03/15/17  6:22 PM   Result Value Ref Range    Color DARK YELLOW      Appearance CLEAR CLEAR      Specific gravity >1.030 (H) 1.003 - 1.030    pH (UA) 5.5 5.0 - 8.0      Protein 30 (A) NEG mg/dL    Glucose NEGATIVE  NEG mg/dL    Ketone NEGATIVE  NEG mg/dL    Blood NEGATIVE  NEG      Urobilinogen 0.2 0.2 - 1.0 EU/dL    Nitrites NEGATIVE  NEG      Leukocyte Esterase NEGATIVE  NEG      WBC 0-4 0 - 4 /hpf    RBC 0-5 0 - 5 /hpf    Epithelial cells FEW FEW /lpf    Bacteria 1+ (A) NEG /hpf    Mucus 2+ (A) NEG /lpf    CA Oxalate crystals FEW (A) NEG     BILIRUBIN, CONFIRM    Collection Time: 03/15/17  6:22 PM   Result Value Ref Range    Bilirubin UA, confirm NEGATIVE  NEG     CBC W/O DIFF    Collection Time: 03/16/17  3:48 AM   Result Value Ref Range    WBC 4.5 4.1 - 11.1 K/uL    RBC 3.73 (L) 4.10 - 5.70 M/uL    HGB 12.9 12.1 - 17.0 g/dL    HCT 36.8 36.6 - 50.3 %    MCV 98.7 80.0 - 99.0 FL MCH 34.6 (H) 26.0 - 34.0 PG    MCHC 35.1 30.0 - 36.5 g/dL    RDW 12.7 11.5 - 14.5 %    PLATELET 597 203 - 090 K/uL   MAGNESIUM    Collection Time: 03/16/17  3:48 AM   Result Value Ref Range    Magnesium 2.0 1.6 - 2.4 mg/dL   METABOLIC PANEL, BASIC    Collection Time: 03/16/17  3:48 AM   Result Value Ref Range    Sodium 137 136 - 145 mmol/L    Potassium 3.5 3.5 - 5.1 mmol/L    Chloride 102 97 - 108 mmol/L    CO2 26 21 - 32 mmol/L    Anion gap 9 5 - 15 mmol/L    Glucose 120 (H) 65 - 100 mg/dL    BUN 17 6 - 20 MG/DL    Creatinine 1.08 0.70 - 1.30 MG/DL    BUN/Creatinine ratio 16 12 - 20      GFR est AA >60 >60 ml/min/1.73m2    GFR est non-AA >60 >60 ml/min/1.73m2    Calcium 8.9 8.5 - 10.1 MG/DL       Medications reviewed  Current Facility-Administered Medications   Medication Dose Route Frequency    iopamidol (ISOVUE 300) 61 % contrast injection 100 mL  100 mL IntraVENous RAD ONCE    lidocaine (LIDODERM) 5 % patch 1 Patch  1 Patch TransDERmal Q24H    fenofibrate nanocrystallized (TRICOR) tablet 145 mg  145 mg Oral DAILY    pantoprazole (PROTONIX) tablet 40 mg  40 mg Oral EVERY OTHER DAY    rosuvastatin (CRESTOR) tablet 10 mg  10 mg Oral DAILY    naloxone (NARCAN) injection 0.4 mg  0.4 mg IntraVENous PRN    0.9% sodium chloride infusion  50 mL/hr IntraVENous CONTINUOUS    acetaminophen (TYLENOL) tablet 650 mg  650 mg Oral Q4H PRN    HYDROcodone-acetaminophen (NORCO) 5-325 mg per tablet 1 Tab  1 Tab Oral Q4H PRN    diphenhydrAMINE (BENADRYL) capsule 25 mg  25 mg Oral Q4H PRN    ondansetron (ZOFRAN) injection 4 mg  4 mg IntraVENous Q4H PRN    HYDROmorphone (DILAUDID) tablet 2 mg  2 mg Oral Q4H PRN    guaiFENesin ER (MUCINEX) tablet 600 mg  600 mg Oral Q12H       Care Plan discussed with: Patient/Family/Pulmonary and Nurse    Total time spent with patient: 30 minutes.     Silvio King MD

## 2017-03-17 PROBLEM — R05.9 COUGH: Status: ACTIVE | Noted: 2017-03-17

## 2017-03-17 PROBLEM — R07.89 ACUTE CHEST WALL PAIN: Status: ACTIVE | Noted: 2017-03-17

## 2017-03-17 PROBLEM — K59.03 THERAPEUTIC OPIOID INDUCED CONSTIPATION: Status: ACTIVE | Noted: 2017-03-17

## 2017-03-17 PROBLEM — R06.02 SOB (SHORTNESS OF BREATH): Status: ACTIVE | Noted: 2017-03-17

## 2017-03-17 PROBLEM — T40.2X5A THERAPEUTIC OPIOID INDUCED CONSTIPATION: Status: ACTIVE | Noted: 2017-03-17

## 2017-03-17 PROCEDURE — 97161 PT EVAL LOW COMPLEX 20 MIN: CPT

## 2017-03-17 PROCEDURE — 65660000000 HC RM CCU STEPDOWN

## 2017-03-17 PROCEDURE — 74011250636 HC RX REV CODE- 250/636: Performed by: FAMILY MEDICINE

## 2017-03-17 PROCEDURE — 77010033678 HC OXYGEN DAILY

## 2017-03-17 PROCEDURE — 97530 THERAPEUTIC ACTIVITIES: CPT

## 2017-03-17 PROCEDURE — 74011250637 HC RX REV CODE- 250/637: Performed by: PHYSICIAN ASSISTANT

## 2017-03-17 PROCEDURE — 74011250636 HC RX REV CODE- 250/636: Performed by: INTERNAL MEDICINE

## 2017-03-17 PROCEDURE — 74011250637 HC RX REV CODE- 250/637: Performed by: INTERNAL MEDICINE

## 2017-03-17 PROCEDURE — 74011250637 HC RX REV CODE- 250/637: Performed by: FAMILY MEDICINE

## 2017-03-17 PROCEDURE — 74011250637 HC RX REV CODE- 250/637: Performed by: NURSE PRACTITIONER

## 2017-03-17 PROCEDURE — 74011000250 HC RX REV CODE- 250: Performed by: INTERNAL MEDICINE

## 2017-03-17 PROCEDURE — 94640 AIRWAY INHALATION TREATMENT: CPT

## 2017-03-17 PROCEDURE — 74011250637 HC RX REV CODE- 250/637: Performed by: STUDENT IN AN ORGANIZED HEALTH CARE EDUCATION/TRAINING PROGRAM

## 2017-03-17 PROCEDURE — 74011250636 HC RX REV CODE- 250/636: Performed by: NURSE PRACTITIONER

## 2017-03-17 PROCEDURE — 76450000000

## 2017-03-17 RX ORDER — ALBUTEROL SULFATE 0.83 MG/ML
2.5 SOLUTION RESPIRATORY (INHALATION)
Status: DISCONTINUED | OUTPATIENT
Start: 2017-03-17 | End: 2017-03-24 | Stop reason: HOSPADM

## 2017-03-17 RX ORDER — MORPHINE SULFATE 4 MG/ML
6 INJECTION, SOLUTION INTRAMUSCULAR; INTRAVENOUS
Status: DISCONTINUED | OUTPATIENT
Start: 2017-03-17 | End: 2017-03-17

## 2017-03-17 RX ORDER — MORPHINE SULFATE 4 MG/ML
4 INJECTION, SOLUTION INTRAMUSCULAR; INTRAVENOUS
Status: DISCONTINUED | OUTPATIENT
Start: 2017-03-17 | End: 2017-03-17

## 2017-03-17 RX ORDER — AMOXICILLIN 250 MG
1 CAPSULE ORAL 2 TIMES DAILY
Status: DISCONTINUED | OUTPATIENT
Start: 2017-03-18 | End: 2017-03-24 | Stop reason: HOSPADM

## 2017-03-17 RX ORDER — BISACODYL 5 MG
10 TABLET, DELAYED RELEASE (ENTERIC COATED) ORAL
Status: COMPLETED | OUTPATIENT
Start: 2017-03-17 | End: 2017-03-17

## 2017-03-17 RX ORDER — ACETYLCYSTEINE 200 MG/ML
2 SOLUTION ORAL; RESPIRATORY (INHALATION) 2 TIMES DAILY
Status: DISCONTINUED | OUTPATIENT
Start: 2017-03-17 | End: 2017-03-24 | Stop reason: HOSPADM

## 2017-03-17 RX ORDER — MORPHINE SULFATE 4 MG/ML
8 INJECTION, SOLUTION INTRAMUSCULAR; INTRAVENOUS
Status: DISCONTINUED | OUTPATIENT
Start: 2017-03-17 | End: 2017-03-24 | Stop reason: HOSPADM

## 2017-03-17 RX ORDER — MORPHINE SULFATE 5 MG/ML
INJECTION, SOLUTION INTRAVENOUS CONTINUOUS
Status: DISCONTINUED | OUTPATIENT
Start: 2017-03-17 | End: 2017-03-22

## 2017-03-17 RX ORDER — KETOROLAC TROMETHAMINE 30 MG/ML
30 INJECTION, SOLUTION INTRAMUSCULAR; INTRAVENOUS
Status: DISPENSED | OUTPATIENT
Start: 2017-03-17 | End: 2017-03-22

## 2017-03-17 RX ORDER — ACETAMINOPHEN 325 MG/1
650 TABLET ORAL
Status: DISCONTINUED | OUTPATIENT
Start: 2017-03-17 | End: 2017-03-24 | Stop reason: HOSPADM

## 2017-03-17 RX ORDER — MORPHINE SULFATE 2 MG/ML
2 INJECTION, SOLUTION INTRAMUSCULAR; INTRAVENOUS ONCE
Status: COMPLETED | OUTPATIENT
Start: 2017-03-17 | End: 2017-03-17

## 2017-03-17 RX ADMIN — ACETYLCYSTEINE 400 MG: 200 SOLUTION ORAL; RESPIRATORY (INHALATION) at 16:12

## 2017-03-17 RX ADMIN — KETOROLAC TROMETHAMINE 30 MG: 30 INJECTION, SOLUTION INTRAMUSCULAR at 09:49

## 2017-03-17 RX ADMIN — BISACODYL 10 MG: 5 TABLET, DELAYED RELEASE ORAL at 18:23

## 2017-03-17 RX ADMIN — HYDROCODONE BITARTRATE AND ACETAMINOPHEN 1 TABLET: 5; 325 TABLET ORAL at 03:24

## 2017-03-17 RX ADMIN — Medication 2 MG: at 14:19

## 2017-03-17 RX ADMIN — FENOFIBRATE 145 MG: 145 TABLET ORAL at 09:55

## 2017-03-17 RX ADMIN — Medication: at 18:33

## 2017-03-17 RX ADMIN — DIPHENHYDRAMINE HYDROCHLORIDE 25 MG: 50 INJECTION, SOLUTION INTRAMUSCULAR; INTRAVENOUS at 07:18

## 2017-03-17 RX ADMIN — GUAIFENESIN 600 MG: 600 TABLET, EXTENDED RELEASE ORAL at 20:04

## 2017-03-17 RX ADMIN — ROSUVASTATIN CALCIUM 10 MG: 10 TABLET, FILM COATED ORAL at 10:45

## 2017-03-17 RX ADMIN — SODIUM CHLORIDE 50 ML/HR: 900 INJECTION, SOLUTION INTRAVENOUS at 20:10

## 2017-03-17 RX ADMIN — Medication 4 MG: at 13:56

## 2017-03-17 RX ADMIN — ALBUTEROL SULFATE 2.5 MG: 2.5 SOLUTION RESPIRATORY (INHALATION) at 16:10

## 2017-03-17 RX ADMIN — ACETAMINOPHEN 650 MG: 325 TABLET ORAL at 20:05

## 2017-03-17 RX ADMIN — PANTOPRAZOLE SODIUM 40 MG: 40 TABLET, DELAYED RELEASE ORAL at 13:59

## 2017-03-17 RX ADMIN — HYDROMORPHONE HYDROCHLORIDE 2 MG: 1 INJECTION, SOLUTION INTRAMUSCULAR; INTRAVENOUS; SUBCUTANEOUS at 07:18

## 2017-03-17 RX ADMIN — GUAIFENESIN 600 MG: 600 TABLET, EXTENDED RELEASE ORAL at 09:55

## 2017-03-17 NOTE — PROGRESS NOTES
Name: [facility]: [address] [place] [address] Dr GOMEZB: 1970 Admit Date: 3/15/2017   Phone:   Room: 5924/01   PCP: Julissa Manzano MD  MRN: [id number]   Date: 3/17/2017  Code: Full Code          Chart and notes reviewed. Data reviewed. I review the patient's current medications in the medical record at each encounter. I have evaluated and examined the patient. Overnight events  Afebrile  Sats 91% on CPAP and 5L bled in  UDS positive for opiates and THC  ETOH < 10  BLE VD negative for DVT  Trop negative    ROS: Pain control remains an issue. IV Dilaudid provides some relief. Cough slightly improved. Still desats quickly off O2 and has significant DE LA TORRE. Denies abd pain. Denies CP. Denies LE pain/swelling. Current Facility-Administered Medications   Medication Dose Route Frequency    diphenhydrAMINE (BENADRYL) injection 25 mg  25 mg IntraVENous Q4H PRN    HYDROmorphone (PF) (DILAUDID) injection 2 mg  2 mg IntraVENous Q4H PRN    polyethylene glycol (MIRALAX) packet 17 g  17 g Oral DAILY    docusate sodium (COLACE) capsule 100 mg  100 mg Oral BID    lidocaine (LIDODERM) 5 % patch 1 Patch  1 Patch TransDERmal Q24H    fenofibrate nanocrystallized (TRICOR) tablet 145 mg  145 mg Oral DAILY    pantoprazole (PROTONIX) tablet 40 mg  40 mg Oral EVERY OTHER DAY    rosuvastatin (CRESTOR) tablet 10 mg  10 mg Oral DAILY    naloxone (NARCAN) injection 0.4 mg  0.4 mg IntraVENous PRN    0.9% sodium chloride infusion  50 mL/hr IntraVENous CONTINUOUS    acetaminophen (TYLENOL) tablet 650 mg  650 mg Oral Q4H PRN    HYDROcodone-acetaminophen (NORCO) 5-325 mg per tablet 1 Tab  1 Tab Oral Q4H PRN    ondansetron (ZOFRAN) injection 4 mg  4 mg IntraVENous Q4H PRN    guaiFENesin ER (MUCINEX) tablet 600 mg  600 mg Oral Q12H         REVIEW OF SYSTEMS   Negative except as stated in the HPI.       Physical Exam:   Visit Vitals    /69    Pulse 64    Temp 98.8 °F (37.1 °C)    Resp 15    Ht 5' 7\" (1.702 m)    Wt 95.8 kg (211 lb 3.2 oz)    SpO2 91%    BMI 33.08 kg/m2       General:  Alert, cooperative, no acute distress at rest, appears stated age. Head:  Normocephalic, without obvious abnormality, atraumatic. Eyes:  Conjunctivae/corneas clear. Nose: Nares normal. Septum midline. Mucosa normal.    Throat: Lips, mucosa, and tongue normal.    Neck: Supple, symmetrical, trachea midline, no adenopathy. Lungs:   Diminished inspiratory effort and splinting with coarse breath sounds and upper air way rhonchi bilaterally. Chest wall:  Tenderness to palpation laterally on the left. Heart:  Regular rate and rhythm, S1, S2 normal, no murmur, click, rub or gallop. Abdomen:   Soft, non-tender. Bowel sounds normal. No masses,  No organomegaly. Extremities: Extremities normal, atraumatic, no cyanosis or edema. Pulses: 2+ and symmetric all extremities. Skin: Skin color, texture, turgor normal. No rashes or lesions   Lymph nodes: Cervical, supraclavicular nodes normal.   Neurologic: Grossly nonfocal       Lab Results   Component Value Date/Time    Sodium 137 03/16/2017 03:48 AM    Potassium 3.5 03/16/2017 03:48 AM    Chloride 102 03/16/2017 03:48 AM    CO2 26 03/16/2017 03:48 AM    BUN 17 03/16/2017 03:48 AM    Creatinine 1.08 03/16/2017 03:48 AM    Glucose 120 03/16/2017 03:48 AM    Calcium 8.9 03/16/2017 03:48 AM    Magnesium 2.0 03/16/2017 03:48 AM    Phosphorus 3.9 06/20/2016 12:29 AM    Lactic acid 1.0 03/15/2017 07:56 AM       Lab Results   Component Value Date/Time    WBC 4.5 03/16/2017 03:48 AM    HGB 12.9 03/16/2017 03:48 AM    PLATELET 080 87/86/7949 03:48 AM    MCV 98.7 03/16/2017 03:48 AM       Lab Results   Component Value Date/Time    INR 1.0 06/16/2016 12:21 PM    AST (SGOT) 76 03/15/2017 07:56 AM    Alk.  phosphatase 60 03/15/2017 07:56 AM    Protein, total 8.2 03/15/2017 07:56 AM    Albumin 4.5 03/15/2017 07:56 AM    Globulin 3.7 03/15/2017 07:56 AM       No results found for: IRON, FE, TIBC, IBCT, PSAT, FERR    No results found for: SR, CRP, KIMBERLY, ANAIGG, RA, RPR, RPRT, VDRLT, VDRLS, TSH, TSHEXT, TSHEXT     No results found for: PH, PHI, PCO2, PCO2I, PO2, PO2I, HCO3, HCO3I, FIO2, FIO2I    Lab Results   Component Value Date/Time    Troponin-I, Qt. <0.04 03/15/2017 10:53 AM        Lab Results   Component Value Date/Time    Culture result: MRSA NOT PRESENT 03/15/2017 11:47 AM    Culture result:  03/15/2017 11:47 AM         Screening of patient nares for MRSA is for surveillance purposes and, if positive, to facilitate isolation considerations in high risk settings. It is not intended for automatic decolonization interventions per se as regimens are not sufficiently effective to warrant routine use. Culture result: NO GROWTH AFTER 22 HOURS 03/15/2017 07:56 AM       No results found for: TOXA1, RPR, HBCM, HBSAG, HAAB, HCAB, HCAB1, HAAT, G6PD, CRYAC, HIVGT, HIVR, HIV1, HIV12, HIVPC, HIVRPI    No results found for: VANCT, CPK    Lab Results   Component Value Date/Time    Color DARK YELLOW 03/15/2017 06:22 PM    Appearance CLEAR 03/15/2017 06:22 PM    Specific gravity >1.030 03/15/2017 06:22 PM    pH (UA) 5.5 03/15/2017 06:22 PM    Protein 30 03/15/2017 06:22 PM    Glucose NEGATIVE  03/15/2017 06:22 PM    Ketone NEGATIVE  03/15/2017 06:22 PM    Bilirubin NEGATIVE  05/31/2016 11:51 AM    Blood NEGATIVE  03/15/2017 06:22 PM    Urobilinogen 0.2 03/15/2017 06:22 PM    Nitrites NEGATIVE  03/15/2017 06:22 PM    Leukocyte Esterase NEGATIVE  03/15/2017 06:22 PM    WBC 0-4 03/15/2017 06:22 PM    RBC 0-5 03/15/2017 06:22 PM    Bacteria 1+ 03/15/2017 06:22 PM       Images: personally visualized    Chest CT (3/16/17): Mildly displaced left 10th and 11th posterior rib fractures. No associated pleural effusion or pneumothorax. Bilateral dependent atelectasis. Small focus of ill-defined airspace disease in the medial left upper lobe may represent atelectasis or early pneumonia.     IMPRESSION  · Acute hypoxic respiratory failure  · Severe dyspnea  · Abnormal chest CT: atx in the bases and potential developing pneumonia in the VAISHNAVI  · Significant chest pain secondary to left rib fractures s/p fall (POA)  · Thrombocytopenia  · GERD  · DARYL  · Tobacco use    PLAN  · Continue O2 to keep sats > 90%  · CPAP nightly and with naps  · Will add on Doxy for potential pneumonia  · Continue IV Dilaudid with Benadryl for pain control. Consult Palliative Care for additional assistance with pain.   · Encourage IS and OOB to chair as tolerated  · Encourage pulmonary toilet  · Mucinex  · Bowel regimen  · PT/OT consult  · Patient refusing DVT prophylaxis with Lovenox, but agrees to SCDs  · GI prophylaxis: Protonix      Kell Salcedo, [address]

## 2017-03-17 NOTE — PROGRESS NOTES
PT has been ordered . Once therapy has been ordered ,if pt needs home PT,I will follow up again with pt. Lidia Julian    Addendum-[facility] [address]   Lidia Julian

## 2017-03-17 NOTE — PROGRESS NOTES
[id number] overnight events. Pt c/o poor pain control so po pain meds alternated w/ IV pain meds and pt stated that he was able to rest comfortably overnight and had much better pain control. VSS overnight. 0715-Report given to Ariel Ahmadi RN.

## 2017-03-17 NOTE — PROGRESS NOTES
Daily Progress Note: 3/17/2017  Anika Murillo MD    Assessment/Plan:   Acute hypoxemic respiratory failure - POA, unclear etiology. Splinting? Narcotic suppression?   ---Admitted to stepdown   ---CT shows rib fractures x2     Sleep apnea - Resume CPAP     Rib fractures - POA, due to fall.   ---CT to assess for lytic lesions. ---Pain control. ---drug and alcohol screen for fall risk assessment. ---Add Toradol     Hyperlipidemia - Continue rosuvastatin and fenofibrate     GERD (gastroesophageal reflux disease) - Continue PPI     Thrombocytopenia (POA):  resolved.      Tobacco abuse - Provide patch. Advised cessation. Spent 5 minutes in addition to all other time spent on admission, noted below. Problem List:  Problem List as of 3/17/2017  Date Reviewed: 3/15/2017          Codes Class Noted - Resolved    * (Principal)Acute hypoxemic respiratory failure ([facility] [address] ICD-10-CM: J96.01  ICD-9-CM: 518.81  3/15/2017 - Present        Sleep apnea ICD-10-CM: G47.30  ICD-9-CM: 780.57  Unknown - Present        Hypercholesteremia ICD-10-CM: E78.00  ICD-9-CM: 272.0  Unknown - Present        Rib fractures ICD-10-CM: S22.39XA  ICD-9-CM: 807.00  3/15/2017 - Present        Hyperlipidemia ICD-10-CM: E78.5  ICD-9-CM: 272.4  5/31/2016 - Present        GERD (gastroesophageal reflux disease) ICD-10-CM: K21.9  ICD-9-CM: 530.81  5/31/2016 - Present        RESOLVED: Diverticulitis of intestine with abscess ICD-10-CM: K57.80  ICD-9-CM: 562.11, 569.5  6/16/2016 - 3/15/2017        RESOLVED: Diverticulitis ICD-10-CM: K57.92  ICD-9-CM: 562.11  5/31/2016 - 3/15/2017        RESOLVED: Bladder wall thickening ICD-10-CM: R07.09  ICD-9-CM: 596.89  5/31/2016 - 3/15/2017        RESOLVED: Hematuria ICD-10-CM: R31.9  ICD-9-CM: 599.70  5/31/2016 - 3/15/2017              Subjective:    48 y.o.  male who presented to the Emergency Department complaining of dyspnea.  It has progressed over 3 days since a fall in which he injured left ribs. He was placed on PO percocet. DE LA TORRE worsened over days, with cough, inability to clear his thin white sputum, and severe pleuritic pain. ER workup shows severe hypoxia, low platelets, but no signs of PNA. We will admit him for management. (Dr Smiley Monteiro)    3/16:  He still feels short of breath at rest.  Sats in mid 80s off his CPAP. Better than yesterday but still has some trouble with complete sentences. Still c/o rib pains. He reports any cough or deep breath is painful. D-dimer neg. CT chest pending. 3/17: CT shows rib fractures. Dopplers neg for DVT. Sats are improving. Still having pain. Review of Systems:   A comprehensive review of systems was negative except for that written in the HPI. Objective:   Physical Exam:     Visit Vitals    /69    Pulse 64    Temp 98.8 °F (37.1 °C)    Resp 15    Ht 5' 7\" (1.702 m)    Wt 211 lb 3.2 oz (95.8 kg)    SpO2 91%    BMI 33.08 kg/m2    O2 Flow Rate (L/min): 5 l/min O2 Device: CPAP nasal    Temp (24hrs), Av.3 °F (36.8 °C), Min:97.7 °F (36.5 °C), Max:98.8 °F (37.1 °C)        03/15 1901 -  0700  In: 0 [P.O.:240; I.V.:1800]  Out: 1425 [Urine:1425]    General:  Alert, cooperative, no distress, appears stated age. Head:  Normocephalic, without obvious abnormality, atraumatic. Eyes:  Conjunctivae/corneas clear. PERRL, EOMs intact. Nose:  No drainage or sinus tenderness. Throat: Lips, mucosa, and tongue moist..   Neck: Supple, symmetrical, trachea midline, no adenopathy, thyroid: no enlargement/tenderness/nodules, no carotid bruit and no JVD. No accessory muscle use   Back:   Symmetric, no curvature. ROM normal. No CVA tenderness. Lungs:   rhonchi bilaterally - no rales at this moment but would only take shallow breaths. .   Chest wall:   tenderness left ant ax line from mid chest down. No deformity. Heart:  Regular rate and rhythm, S1, S2 normal, no murmur, click, rub or gallop. Abdomen:   Soft, non-tender. Bowel sounds normal. No masses,  No organomegaly. Extremities: no cyanosis or edema. No calf tenderness or cords. Pulses: 2+ and symmetric all extremities. Skin: Skin color, texture, turgor normal. No rashes or lesions   Neurologic: CNII-XII intact. Alert and oriented X 3. Fine motor of hands and fingers normal.   equal.  No cogwheeling or rigidity. Gait not tested at this time. Sensation grossly normal to touch. Gross motor of extremities normal.       Data Review:     Venous Duplex LE 3/15/17:  Bilateral lower extremity venous duplex negative for deep  venous thrombosis or thrombophlebitis    Results for Estefania Caraballo (MRN [id number]) as of 3/16/2017 08:50   Ref. Range 3/15/2017 10:53 3/15/2017 18:22   AMPHETAMINE Latest Ref Range: NEG    NEGATIVE   BARBITURATES Latest Ref Range: NEG    NEGATIVE   BENZODIAZEPINE Latest Ref Range: NEG    NEGATIVE   COCAINE Latest Ref Range: NEG    NEGATIVE   METHADONE Latest Ref Range: NEG    NEGATIVE   OPIATES Latest Ref Range: NEG    POSITIVE (A)   PCP(PHENCYCLIDINE) Latest Ref Range: NEG    NEGATIVE   THC (TH-CANNABINOL) Latest Ref Range: NEG    POSITIVE (A)   ALCOHOL(ETHYL),SERUM Latest Ref Range: <10 MG/DL <10        Recent Days:  Recent Labs      03/16/17   0348  03/15/17   0756   WBC  4.5  3.8*   HGB  12.9  15.2   HCT  36.8  43.7   PLT  151  70*     Recent Labs      03/16/17   0348  03/15/17   0756   NA  137  137   K  3.5  4.3   CL  102  102   CO2  26  22   GLU  120*  123*   BUN  17  12   CREA  1.08  0.89   CA  8.9  9.4   MG  2.0   --    ALB   --   4.5   TBILI   --   0.7   SGOT   --   76*   ALT   --   86*     Recent Labs      03/15/17   1000   PH  7.40   PCO2  37   PO2  58*   HCO3  22       24 Hour Results:  No results found for this or any previous visit (from the past 24 hour(s)).     Medications reviewed  Current Facility-Administered Medications   Medication Dose Route Frequency    diphenhydrAMINE (BENADRYL) injection 25 mg  25 mg IntraVENous Q4H PRN  HYDROmorphone (PF) (DILAUDID) injection 2 mg  2 mg IntraVENous Q4H PRN    polyethylene glycol (MIRALAX) packet 17 g  17 g Oral DAILY    docusate sodium (COLACE) capsule 100 mg  100 mg Oral BID    lidocaine (LIDODERM) 5 % patch 1 Patch  1 Patch TransDERmal Q24H    fenofibrate nanocrystallized (TRICOR) tablet 145 mg  145 mg Oral DAILY    pantoprazole (PROTONIX) tablet 40 mg  40 mg Oral EVERY OTHER DAY    rosuvastatin (CRESTOR) tablet 10 mg  10 mg Oral DAILY    naloxone (NARCAN) injection 0.4 mg  0.4 mg IntraVENous PRN    0.9% sodium chloride infusion  50 mL/hr IntraVENous CONTINUOUS    acetaminophen (TYLENOL) tablet 650 mg  650 mg Oral Q4H PRN    HYDROcodone-acetaminophen (NORCO) 5-325 mg per tablet 1 Tab  1 Tab Oral Q4H PRN    ondansetron (ZOFRAN) injection 4 mg  4 mg IntraVENous Q4H PRN    guaiFENesin ER (MUCINEX) tablet 600 mg  600 mg Oral Q12H       Care Plan discussed with: Patient/Family    Total time spent with patient: 30 minutes.     Bahman Alvarado MD

## 2017-03-17 NOTE — PROGRESS NOTES
Bedside and Verbal shift change [facility] by [facility] (offgoing nurse). Report included the following information SBAR, Kardex, Procedure Summary, Intake/Output, MAR, Accordion, Recent Results, Med Rec Status and Cardiac Rhythm SR.   0833:Iban Deleon at bedside to assess pt  0840:Dr. Savita Melendez  at bedside to assess pt   Pt c/o itching with Dilaudid spoke with Dr. Roly Boels and changed to morphine IV  [phone]: Physical therapy at bedside work with pt  Encouraged pt during shift to deep breathe and cough, use incentive spirometer,  Educated on increased risk for Pneumonia he does not move around and deep breath and cough  Pain control is issue during shift,  paliative team came to assess pt to make recommendations for pain control,  PCA morphine pump started per recommendations  Bedside and Verbal shift change report given to [address] (oncoming nurse) by Aleksey Garcia (offgoing nurse).  Report included the following information SBAR, Kardex, ED Summary, Procedure Summary, Intake/Output, MAR, Accordion, Recent Results, Med Rec Status and Cardiac Rhythm SR.

## 2017-03-17 NOTE — PROGRESS NOTES
Problem: Mobility Impaired (Adult and Pediatric)  Goal: *Therapy Goal (Edit Goal, Insert Text)  Physical Therapy Goals  Initiated 3/17/2017  1. Patient will move from supine to side lying (log roll) and side lying  to sit  in bed with minimal assistance/contact guard assist within 7 day(s). 2.  Patient will transfer from bed to chair and chair to bed with supervision/set-up using the least restrictive device within 7 day(s). 3.  Patient will perform sit to stand with independence within 7 day(s). 4.  Patient will ambulate with independence for 100 feet with the least restrictive device within 7 day(s). 5.  Patient will ascend/descend 3 stairs with no handrail(s) with supervision/set-up within 7 day(s). Comments:   PHYSICAL THERAPY EVALUATION  Patient: Hazel Paget (11 y.o. male)  Date: 3/17/2017  Primary Diagnosis: Acute hypoxemic respiratory failure (HCC)        Precautions:   s/p abdmoinal and hernia surgery prior to rib fxs. ASSESSMENT :  Based on the objective data described below, the patient presents with increased pain secondary to fall and fracture of 3-ribs. Pt is s/p abdominal and hernia surgery which further limits pt's safe bed mobility and transfers into/out of bed d/t increased pain in right posteriomedial thorax. Pt is baseline with sit to stand transfers and ambulation without AD. Pt lives with wife and son in a one story home with 3-steps to enter but the steps do not have handrails. Pt does not own any DME. In addition, pt is alone during the day as wife works. Pt's plan of care will be to focus on bed mobility and safety techniques to maximize functional independence. Pt refused to sit up in chair after evaluation and demanded to stay in the bed. PT will follow pt for therapy. Patient will benefit from skilled intervention to address the above impairments.   Patients rehabilitation potential is considered to be Good  Factors which may influence rehabilitation potential include:   [ ]         None noted  [ ]         Mental ability/status  [X]         Medical condition  [X]         Home/family situation and support systems  [X]         Safety awareness  [X]         Pain tolerance/management  [ ]         Other:        PLAN :  Recommendations and Planned Interventions:  [X]           Bed Mobility Training             [ ]    Neuromuscular Re-Education  [X]           Transfer Training                   [ ]    Orthotic/Prosthetic Training  [ ]           Gait Training                         [ ]    Modalities  [X]           Therapeutic Exercises           [ ]    Edema Management/Control  [X]           Therapeutic Activities            [X]    Patient and Family Training/Education  [ ]           Other (comment):     Frequency/Duration: Patient will be followed by physical therapy  daily to address goals. Discharge Recommendations: To Be Determined  Further Equipment Recommendations for Discharge: TBD       SUBJECTIVE:   Patient stated I am in a lot of pain.       OBJECTIVE DATA SUMMARY:   HISTORY:    Past Medical History:   Diagnosis Date    Diverticula of colon      GERD (gastroesophageal reflux disease)       occasional    Hypercholesteremia      Microscopic hematuria      Sleep apnea       Past Surgical History:   Procedure Laterality Date    ABDOMEN SURGERY PROC UNLISTED   06/2016     bowel resec with illeostomy    HX APPENDECTOMY        HX HEENT   2008     left ear TM graft    HX HEENT   Sep 2009     left ear surgery - cholestoma    HX HEENT   Sep 2010     left ear TM graft revision    HX HEENT   Aug 1998     left ear tube placement    HX ORTHOPAEDIC         left knee A/S x 3    HX ORTHOPAEDIC         left knee ACL repair     Prior Level of Function/Home Situation: Per pt report, pt was independent with all functional mobility.   Personal factors and/or comorbidities impacting plan of care:      Home Situation  Home Environment: Private residence  # Steps to Enter: 3  Rails to Enter: No  Wheelchair Ramp: No  One/Two Story Residence: One story  Living Alone: No  Support Systems: Child(elena), Spouse/Significant Other/Partner  Patient Expects to be Discharged to[de-identified] Private residence  Current DME Used/Available at Home: None  Tub or Shower Type: Tub/Shower combination     EXAMINATION/PRESENTATION/DECISION MAKING:   Critical Behavior:  Neurologic State: Alert, Appropriate for age  Orientation Level: Oriented X4  Cognition: Appropriate for age attention/concentration, Appropriate decision making  Safety/Judgement: Awareness of environment  Hearing: Auditory  Auditory Impairment: None  Skin:  Age appropriate  Edema: none noted  Range Of Motion:              PROM: Generally decreased, functional           Strength:    Strength: Generally decreased, functional                    Tone & Sensation:   Tone: Normal              Sensation: Intact               Coordination:  Coordination: Within functional limits  Vision:      Functional Mobility:  Bed Mobility:  Rolling: Moderate assistance;Assist x1  Supine to Sit: Moderate assistance (for BLEs)  Sit to Supine: Maximum assistance;Assist x1  Scooting: Stand-by asssistance  Transfers:  Sit to Stand: Stand-by asssistance  Stand to Sit: Stand-by asssistance  Stand Pivot Transfers: Stand-by asssistance     Bed to Chair: Stand-by asssistance              Balance:   Sitting: Intact; Without support  Standing: Intact; Without support  Ambulation/Gait Training:              Gait Description (WDL): Within defined limits                                                                Stairs:      Not assessed during this Rx session. Therapeutic Exercises:   Not assessed during this Rx session.      Functional Measure:  Barthel Index:      Bathin  Bladder: 10  Bowels: 10  Groomin  Dressin  Feeding: 10  Mobility: 10  Stairs: 10  Toilet Use: 5  Transfer (Bed to Chair and Back): 10  Total: 75         Barthel and G-code impairment scale:  Percentage of impairment CH  0% CI  1-19% CJ  20-39% CK  40-59% CL  60-79% CM  80-99% CN  100%   Barthel Score 0-100 100 99-80 79-60 59-40 20-39 1-19    0   Barthel Score 0-20 20 17-19 13-16 9-12 5-8 1-4 0      The Barthel ADL Index: Guidelines  1. The index should be used as a record of what a patient does, not as a record of what a patient could do. 2. The main aim is to establish degree of independence from any help, physical or verbal, however minor and for whatever reason. 3. The need for supervision renders the patient not independent. 4. A patient's performance should be established using the best available evidence. Asking the patient, friends/relatives and nurses are the usual sources, but direct observation and common sense are also important. However direct testing is not needed. 5. Usually the patient's performance over the preceding 24-48 hours is important, but occasionally longer periods will be relevant. 6. Middle categories imply that the patient supplies over 50 per cent of the effort. 7. Use of aids to be independent is allowed. Charisse Pena., Barthel, D.W. (0548). Functional evaluation: the Barthel Index. [facility] (14)2. REBECCA Murphy, Rosa Galindo., [facility], [address] [place] [address] (1999). Measuring the change indisability after inpatient rehabilitation; comparison of the responsiveness of the Barthel Index and Functional [place] Measure. Journal of Neurology, Neurosurgery, and Psychiatry, 66(4), 884-680. Korina Matamoros, N.J.A, ALINA Rodriguez, & Eulalio Haddad, M.A. (2004.) Assessment of post-stroke quality of life in cost-effectiveness studies: The usefulness of the Barthel Index and the EuroQoL-5D. Quality of Life Research, 13, 979-88         G codes: In compliance with CMSs Claims Based Outcome Reporting, the following G-code set was chosen for this patient based on their primary functional limitation being treated:      The outcome measure chosen to determine the severity of the functional limitation was the Barthel Index with a score of 75/100 which was correlated with the impairment scale. · Mobility - Walking and Moving Around:               - CURRENT STATUS:    CJ - 20%-39% impaired, limited or restricted               - GOAL STATUS:           CI - 1%-19% impaired, limited or restricted               - D/C STATUS:                       ---------------To be determined---------------      Physical Therapy Evaluation Charge Determination   History Examination Presentation Decision-Making   LOW Complexity : Zero comorbidities / personal factors that will impact the outcome / POC MEDIUM Complexity : 3 Standardized tests and measures addressing body structure, function, activity limitation and / or participation in recreation  MEDIUM Complexity : Evolving with changing characteristics  LOW Complexity : FOTO score of       Based on the above components, the patient evaluation is determined to be of the following complexity level: LOW      Pain:  Pain Scale 1: Numeric (0 - 10)  Pain Intensity 1: 7  Pain Location 1: Back  Pain Orientation 1: Lower; Posterior  Pain Description 1: Aching  Pain Intervention(s) 1: Medication (see MAR)  Activity Tolerance:   Pt agreed to participate with therapy and able to tolerate bed mobility training well. Please refer to the flowsheet for vital signs taken during this treatment. After treatment:   [ ]         Patient left in no apparent distress sitting up in chair  [X]         Patient left in no apparent distress in bed  [X]         Call bell left within reach  [X]         Nursing notified  [X]         Caregiver present  [ ]         Bed alarm activated      COMMUNICATION/EDUCATION:   The patients plan of care was discussed with: Registered Nurse. [ ]         Fall prevention education was provided and the patient/caregiver indicated understanding.   [ ]         Patient/family have participated as able in goal setting and plan of care. [ ]         Patient/family agree to work toward stated goals and plan of care. [ ]         Patient understands intent and goals of therapy, but is neutral about his/her participation. [ ]         Patient is unable to participate in goal setting and plan of care.      Thank you for this referral.  Verónica Gonzales, PT   Time Calculation: 22 mins

## 2017-03-17 NOTE — CONSULTS
Palliative Medicine Consult  Ef[place]: [phone] ([phone])    Patient Name: Munira Cardoza  YOB: 1970    Date of Initial Consult: 3/17/17  Reason for Consult: overwhelming sxs  Requesting Provider: Levy Ng   Primary Care Physician: Catherine Staley MD      SUMMARY:   Munira Cardoza is a 48 y.o. with a past history of diverticula of colon, GERD, DARYL, recent hernia repair last month, who was admitted on 3/15/2017 from home with a diagnosis of acute hypoxemic respiratory failure. Current medical issues leading to Palliative Medicine involvement include: pain management. Pt has high opioid tolerance due to recent surgeries including ileostomy 6/16, reanastomosis 10/16, recent hernia repair 5 weeks ago.  reviewed, no aberrant behaviors noted. PALLIATIVE DIAGNOSES:   1. Chest wall pain: hairline fx 6th rib on CXR, Mildly displaced left 10th and 11th posterior rib fractures. No associated pleural effusion or  pneumothorax. Bilateral dependent atelectasis. Small focus of ill-defined airspace disease in the medial left upper lobe may represent   atelectasis or early pneumonia. 2. Cough   3. Dyspnea  4. Constipation opioid induced       PLAN:   1. PAIN:  1. Morphine PCA: 2mg load, 0.5mg bolus with 10 min lockout, 1.5mg basal rate, 20mg/4 hour limit. 2. Morphine 4mg IVP rescue dose q. 4 hours prn.  3. If medication adjustments are needed over the weekend, call MD on call [phone].  4. Am waiting a return call from IR regarding scheduling nerve block on Monday. 2. CONSTIPATION: no BM in 4 days, urges to go  1. D/c prn colace. 2. Continue Miralax daily. 3. Dulcolax   3. Initial consult note routed to primary continuity provider  4.  Communicated plan of care with: Palliative IDT, RN       GOALS OF CARE / TREATMENT PREFERENCES:   [====Goals of Care====]  GOALS OF CARE:  Patient / health care proxy stated goals:     Pain relief      TREATMENT PREFERENCES:   Code Status: Full Code    Advance Care Planning:  Advance Care Planning 3/15/2017   Patient's Healthcare Decision Maker is: Legal Next of Kin   Primary Decision Maker Name Lina Singletary   Primary Decision Maker Phone Number [phone]   Primary Decision Maker Relationship to Patient Spouse   Confirm Advance Directive Yes, not on file   Does the patient have other document types Power of        Other:    The palliative care team has discussed with patient / health care proxy about goals of care / treatment preferences for patient.  [====Goals of Care====]         HISTORY:     History obtained from: pt, chart    CHIEF COMPLAINT: dyspnea    HPI/SUBJECTIVE:    The patient is:   [x] Verbal and participatory  [] Non-participatory due to:     Presented to ED with c/o dyspnea, started 3 days ago when he was leaning back in a chair and fell, striking his left side, his PCP told him he probably fx left ribs and was given Percocet. DE [place] T[place]RE worsened over days, with associated cough, inability to clear his thin white phlegm, and severe pleuritic pain. ER w/u shows severe hypoxia, low platelets. At baseline pt reports \"smoker's cough. \"   Pt has high tolerance to opioids given surgical history; ileostomy 6/2016, reanastomosis 10/2016, hernia repair 5 weeks ago. Pt unable to talk much due to high flow 02, pain with chest wall motion. Reports last BM 4 days ago, has urge to pass stool but unable due to inability to bare down. Clinical Pain Assessment (nonverbal scale for severity on nonverbal patients):   [++++ Clinical Pain Assessment++++]  [++++Pain Severity++++]: Pain: 8  [++++Pain Character++++]: sharp  [++++Pain Duration++++]:  3 days  [++++Pain Effect++++]:     Not breathing, developing atalectasis  [++++Pain Factors++++]:   Movement increases pain  [++++Pain Frequency++++]: constant  [++++Pain Location++++]:   Left chest  [++++ Clinical Pain Assessment++++]     FUNCTIONAL ASSESSMENT:     Palliative Performance Scale (PPS):  PPS: 20       PSYCHOSOCIAL/SPIRITUAL SCREENING:     Advance Care Planning:  Advance Care Planning 3/15/2017   Patient's Healthcare Decision Maker is: Legal Next of Kin   Primary Decision Maker Name Elizabeth Oropeza   Primary Decision Maker Phone Number [phone]   Primary Decision Maker Relationship to Patient Spouse   Confirm Advance Directive Yes, not on file   Does the patient have other document types Power of         Any spiritual / Islam concerns:  [] Yes /  [x] No    Caregiver Burnout:  [] Yes /  [x] No /  [] No Caregiver Present      Anticipatory grief assessment:   [x] Normal  / [] Maladaptive       ESAS Anxiety: Anxiety: 0    ESAS Depression: Depression: 0        REVIEW OF SYSTEMS:     Positive and pertinent negative findings in ROS are noted above in HPI. The following systems were [x] reviewed / [] unable to be reviewed as noted in HPI  Other findings are noted below. Systems: constitutional, ears/nose/mouth/throat, respiratory, gastrointestinal, genitourinary, musculoskeletal, integumentary, neurologic, psychiatric, endocrine. Positive findings noted below. Modified ESAS Completed by: provider   Fatigue: 8 Drowsiness: 0   Depression: 0 Pain: 8   Anxiety: 0 Nausea: 0   Anorexia: 5 Dyspnea: 3     Constipation: Yes              PHYSICAL EXAM:     From RN flowsheet:  Wt Readings from Last 3 Encounters:   03/16/17 211 lb 3.2 oz (95.8 kg)   06/16/16 185 lb 4 oz (84 kg)   06/01/16 186 lb (84.4 kg)     Blood pressure 131/66, pulse 91, temperature 98.9 °F (37.2 °C), resp. rate 16, height 5' 7\" (1.702 m), weight 211 lb 3.2 oz (95.8 kg), SpO2 94 %.     Pain Scale 1: Numeric (0 - 10)  Pain Intensity 1: 7  Pain Onset 1: acute  Pain Location 1: Back  Pain Orientation 1: Lower, Posterior  Pain Description 1: Aching  Pain Intervention(s) 1: Medication (see MAR)  Last bowel movement, if known:     Constitutional: WD, WN, mod-severe distress 2/2 chest wall pain  Eyes: pupils equal, anicteric  ENMT: no nasal discharge, moist mucous membranes  Cardiovascular: regular rhythm, distal pulses intact  Respiratory: breathing very shallow, very little air movement  Gastrointestinal: soft non-tender, +bowel sounds  Musculoskeletal: no deformity, ribs left side very TTP  Skin: warm, dry  Neurologic: following commands, moving all extremities  Psychiatric: full affect, no hallucinations         HISTORY:     Principal Problem:    Acute hypoxemic respiratory failure ([address] (3/15/2017)    Active Problems:    Hyperlipidemia (5/31/2016)      GERD (gastroesophageal reflux disease) (5/31/2016)      Sleep apnea ()      Hypercholesteremia ()      Rib fractures (3/15/2017)      Past Medical History:   Diagnosis Date    Diverticula of colon     GERD (gastroesophageal reflux disease)     occasional    Hypercholesteremia     Microscopic hematuria     Sleep apnea       Past Surgical History:   Procedure Laterality Date    ABDOMEN SURGERY PROC UNLISTED  06/2016    bowel resec with illeostomy    HX APPENDECTOMY      HX HEENT  2008    left ear TM graft    HX HEENT  Sep 2009    left ear surgery - cholestoma    HX HEENT  Sep 2010    left ear TM graft revision    HX HEENT  Aug 1998    left ear tube placement    HX ORTHOPAEDIC      left knee A/S x 3    HX ORTHOPAEDIC      left knee ACL repair      Family History   Problem Relation Age of Onset    Heart Disease Father     Hypertension Sister       History reviewed, no pertinent family history. Social History   Substance Use Topics    Smoking status: Current Every Day Smoker     Packs/day: 1.00     Years: 27.00    Smokeless tobacco: Never Used    Alcohol use 1.0 oz/week     1 Cans of beer, 1 Standard drinks or equivalent per week      Comment: 3x/ wk     Allergies   Allergen Reactions    Wellbutrin [Bupropion] Swelling     Angioedema      Adhesive Tape-Silicones Contact Dermatitis     Pt reports he can tolerate paper tape.     Azithromycin Hives    Cephalexin Hives     Pt has taken Augmentin before and tolerated it per rn    Hydromorphone Itching and Nausea Only    Ketamine Hives and Swelling      Current Facility-Administered Medications   Medication Dose Route Frequency    ketorolac (TORADOL) injection 30 mg  30 mg IntraVENous Q6H PRN    acetylcysteine (MUCOMYST) 200 mg/mL (20 %) solution 400 mg  2 mL Nebulization BID    morphine injection 8 mg  8 mg IntraVENous Q4H PRN    albuterol (PROVENTIL VENTOLIN) nebulizer solution 2.5 mg  2.5 mg Nebulization Q6H PRN    bisacodyl (DULCOLAX) tablet 10 mg  10 mg Oral NOW    [START ON 3/18/2017] senna-docusate (PERICOLACE) 8.6-50 mg per tablet 1 Tab  1 Tab Oral BID    morphine (PF)  mg/30 ml   IntraVENous CONTINUOUS    diphenhydrAMINE (BENADRYL) injection 25 mg  25 mg IntraVENous Q4H PRN    polyethylene glycol (MIRALAX) packet 17 g  17 g Oral DAILY    lidocaine (LIDODERM) 5 % patch 1 Patch  1 Patch TransDERmal Q24H    fenofibrate nanocrystallized (TRICOR) tablet 145 mg  145 mg Oral DAILY    pantoprazole (PROTONIX) tablet 40 mg  40 mg Oral EVERY OTHER DAY    rosuvastatin (CRESTOR) tablet 10 mg  10 mg Oral DAILY    naloxone (NARCAN) injection 0.4 mg  0.4 mg IntraVENous PRN    0.9% sodium chloride infusion  50 mL/hr IntraVENous CONTINUOUS    HYDROcodone-acetaminophen (NORCO) 5-325 mg per tablet 1 Tab  1 Tab Oral Q4H PRN    ondansetron (ZOFRAN) injection 4 mg  4 mg IntraVENous Q4H PRN    guaiFENesin ER (MUCINEX) tablet 600 mg  600 mg Oral Q12H          LAB AND IMAGING FINDINGS:     Lab Results   Component Value Date/Time    WBC 4.5 03/16/2017 03:48 AM    HGB 12.9 03/16/2017 03:48 AM    PLATELET 522 91/16/7288 03:48 AM     Lab Results   Component Value Date/Time    Sodium 137 03/16/2017 03:48 AM    Potassium 3.5 03/16/2017 03:48 AM    Chloride 102 03/16/2017 03:48 AM    CO2 26 03/16/2017 03:48 AM    BUN 17 03/16/2017 03:48 AM    Creatinine 1.08 03/16/2017 03:48 AM    Calcium 8.9 03/16/2017 03:48 AM    Magnesium 2.0 03/16/2017 03:48 AM    Phosphorus 3.9 06/20/2016 12:29 AM      Lab Results   Component Value Date/Time    AST (SGOT) 76 03/15/2017 07:56 AM    Alk. phosphatase 60 03/15/2017 07:56 AM    Protein, total 8.2 03/15/2017 07:56 AM    Albumin 4.5 03/15/2017 07:56 AM    Globulin 3.7 03/15/2017 07:56 AM     Lab Results   Component Value Date/Time    INR 1.0 06/16/2016 12:21 PM    Prothrombin time 10.3 06/16/2016 12:21 PM      No results found for: IRON, FE, TIBC, IBCT, PSAT, FERR   Lab Results   Component Value Date/Time    pH 7.40 03/15/2017 10:00 AM    PCO2 37 03/15/2017 10:00 AM    PO2 58 03/15/2017 10:00 AM     No components found for: GLPOC   No results found for: CPK, CKMB             Total time: 75 min  Counseling / coordination time: 65 min  > 50% counseling / coordination?: yes    Prolonged service was provided for  []30 min   []75 min in face to face time in the presence of the patient. Time Start:   Time End:   Note: this can only be billed with 42404 (initial) or 43869 (follow up). If multiple start / stop times, list each separately.

## 2017-03-18 PROCEDURE — 74011250636 HC RX REV CODE- 250/636: Performed by: INTERNAL MEDICINE

## 2017-03-18 PROCEDURE — 94640 AIRWAY INHALATION TREATMENT: CPT

## 2017-03-18 PROCEDURE — 77010033711 HC HIGH FLOW OXYGEN

## 2017-03-18 PROCEDURE — 74011000250 HC RX REV CODE- 250: Performed by: INTERNAL MEDICINE

## 2017-03-18 PROCEDURE — 74011250637 HC RX REV CODE- 250/637: Performed by: INTERNAL MEDICINE

## 2017-03-18 PROCEDURE — 74011250637 HC RX REV CODE- 250/637: Performed by: STUDENT IN AN ORGANIZED HEALTH CARE EDUCATION/TRAINING PROGRAM

## 2017-03-18 PROCEDURE — 65660000000 HC RM CCU STEPDOWN

## 2017-03-18 PROCEDURE — 74011250637 HC RX REV CODE- 250/637: Performed by: FAMILY MEDICINE

## 2017-03-18 PROCEDURE — 74011250637 HC RX REV CODE- 250/637: Performed by: PHYSICIAN ASSISTANT

## 2017-03-18 PROCEDURE — 74011250637 HC RX REV CODE- 250/637: Performed by: NURSE PRACTITIONER

## 2017-03-18 RX ORDER — IPRATROPIUM BROMIDE AND ALBUTEROL SULFATE 2.5; .5 MG/3ML; MG/3ML
3 SOLUTION RESPIRATORY (INHALATION)
Status: DISCONTINUED | OUTPATIENT
Start: 2017-03-18 | End: 2017-03-24 | Stop reason: HOSPADM

## 2017-03-18 RX ADMIN — SODIUM CHLORIDE 50 ML/HR: 900 INJECTION, SOLUTION INTRAVENOUS at 15:22

## 2017-03-18 RX ADMIN — ACETYLCYSTEINE 400 MG: 200 SOLUTION ORAL; RESPIRATORY (INHALATION) at 10:12

## 2017-03-18 RX ADMIN — GUAIFENESIN 600 MG: 600 TABLET, EXTENDED RELEASE ORAL at 09:46

## 2017-03-18 RX ADMIN — GUAIFENESIN 600 MG: 600 TABLET, EXTENDED RELEASE ORAL at 21:05

## 2017-03-18 RX ADMIN — DOCUSATE SODIUM -SENNOSIDES 1 TABLET: 50; 8.6 TABLET, COATED ORAL at 09:46

## 2017-03-18 RX ADMIN — METHYLPREDNISOLONE SODIUM SUCCINATE 40 MG: 40 INJECTION, POWDER, FOR SOLUTION INTRAMUSCULAR; INTRAVENOUS at 12:59

## 2017-03-18 RX ADMIN — IPRATROPIUM BROMIDE AND ALBUTEROL SULFATE 3 ML: .5; 3 SOLUTION RESPIRATORY (INHALATION) at 20:09

## 2017-03-18 RX ADMIN — ACETAMINOPHEN 650 MG: 325 TABLET ORAL at 13:00

## 2017-03-18 RX ADMIN — FENOFIBRATE 145 MG: 145 TABLET ORAL at 09:45

## 2017-03-18 RX ADMIN — ROSUVASTATIN CALCIUM 10 MG: 10 TABLET, FILM COATED ORAL at 09:46

## 2017-03-18 RX ADMIN — ACETYLCYSTEINE 400 MG: 200 SOLUTION ORAL; RESPIRATORY (INHALATION) at 17:43

## 2017-03-18 RX ADMIN — METHYLPREDNISOLONE SODIUM SUCCINATE 40 MG: 40 INJECTION, POWDER, FOR SOLUTION INTRAMUSCULAR; INTRAVENOUS at 21:07

## 2017-03-18 RX ADMIN — IPRATROPIUM BROMIDE AND ALBUTEROL SULFATE 3 ML: .5; 3 SOLUTION RESPIRATORY (INHALATION) at 13:08

## 2017-03-18 NOTE — PROGRESS NOTES
Name: Shanta Carry: Chino Majano   : 1970 Admit Date: 3/15/2017   Phone:   Room: 9884/01   PCP: Jean-Paul Durán MD  MRN: [id number]   Date: 3/18/2017  Code: Full Code          Chart and notes reviewed. Data reviewed. I review the patient's current medications in the medical record at each encounter. I have evaluated and examined the patient. Overnight events reviewed:  Afebrile overnight  White count remains normal  On high flow O2 with sats of 92-94%  Pleuritic chest pain continues  --pain control is better with Morphine PCA  Nebs seem to help  Cough is congested  Trying to use IS  UDS positive for opiates and THC  ETOH < 10  BLE VD negative for DVT    ROS: Pain control still an issue. Cough slightly improved. Still desats quickly off O2 and has significant DE LA TORRE. Denies abd pain. Denies CP. Denies LE pain/swelling.     Current Facility-Administered Medications   Medication Dose Route Frequency    albuterol-ipratropium (DUO-NEB) 2.5 MG-0.5 MG/3 ML  3 mL Nebulization Q6H RT    methylPREDNISolone (PF) (SOLU-MEDROL) injection 40 mg  40 mg IntraVENous Q8H    ketorolac (TORADOL) injection 30 mg  30 mg IntraVENous Q6H PRN    acetylcysteine (MUCOMYST) 200 mg/mL (20 %) solution 400 mg  2 mL Nebulization BID    morphine injection 8 mg  8 mg IntraVENous Q4H PRN    albuterol (PROVENTIL VENTOLIN) nebulizer solution 2.5 mg  2.5 mg Nebulization Q6H PRN    senna-docusate (PERICOLACE) 8.6-50 mg per tablet 1 Tab  1 Tab Oral BID    morphine (PF)  mg/30 ml   IntraVENous CONTINUOUS    acetaminophen (TYLENOL) tablet 650 mg  650 mg Oral Q6H PRN    diphenhydrAMINE (BENADRYL) injection 25 mg  25 mg IntraVENous Q4H PRN    polyethylene glycol (MIRALAX) packet 17 g  17 g Oral DAILY    lidocaine (LIDODERM) 5 % patch 1 Patch  1 Patch TransDERmal Q24H    fenofibrate nanocrystallized (TRICOR) tablet 145 mg  145 mg Oral DAILY    pantoprazole (PROTONIX) tablet 40 mg  40 mg Oral EVERY OTHER DAY    rosuvastatin (CRESTOR) tablet 10 mg  10 mg Oral DAILY    naloxone (NARCAN) injection 0.4 mg  0.4 mg IntraVENous PRN    0.9% sodium chloride infusion  50 mL/hr IntraVENous CONTINUOUS    HYDROcodone-acetaminophen (NORCO) 5-325 mg per tablet 1 Tab  1 Tab Oral Q4H PRN    ondansetron (ZOFRAN) injection 4 mg  4 mg IntraVENous Q4H PRN    guaiFENesin ER (MUCINEX) tablet 600 mg  600 mg Oral Q12H         REVIEW OF SYSTEMS   Negative except as stated in the HPI. Physical Exam:   Visit Vitals    /58    Pulse 69    Temp 98.8 °F (37.1 °C)    Resp 19    Ht 5' 7\" (1.702 m)    Wt 95.8 kg (211 lb 3.2 oz)    SpO2 90%    BMI 33.08 kg/m2       General:  Alert, cooperative, no acute distress at rest, appears stated age. Head:  Normocephalic, without obvious abnormality, atraumatic. Eyes:  Conjunctivae/corneas clear. Nose: Nares normal. Septum midline. Mucosa normal.    Throat: Lips, mucosa, and tongue normal.    Neck: Supple, symmetrical, trachea midline, no adenopathy. Lungs:   Diminished inspiratory effort and splinting with coarse breath sounds and upper air way rhonchi bilaterally. Chest wall:  Tenderness to palpation laterally on the left. Heart:  Regular rate and rhythm, S1, S2 normal, no murmur, click, rub or gallop. Abdomen:   Soft, non-tender. Bowel sounds normal. No masses,  No organomegaly. Extremities: Extremities normal, atraumatic, no cyanosis or edema. Pulses: 2+ and symmetric all extremities.    Skin: Skin color, texture, turgor normal. No rashes or lesions   Lymph nodes: Cervical, supraclavicular nodes normal.   Neurologic: Grossly nonfocal       Lab Results   Component Value Date/Time    Sodium 137 03/16/2017 03:48 AM    Potassium 3.5 03/16/2017 03:48 AM    Chloride 102 03/16/2017 03:48 AM    CO2 26 03/16/2017 03:48 AM    BUN 17 03/16/2017 03:48 AM    Creatinine 1.08 03/16/2017 03:48 AM    Glucose 120 03/16/2017 03:48 AM    Calcium 8.9 03/16/2017 03:48 AM Magnesium 2.0 03/16/2017 03:48 AM    Phosphorus 3.9 06/20/2016 12:29 AM    Lactic acid 1.0 03/15/2017 07:56 AM       Lab Results   Component Value Date/Time    WBC 4.5 03/16/2017 03:48 AM    HGB 12.9 03/16/2017 03:48 AM    PLATELET 062 85/24/6533 03:48 AM    MCV 98.7 03/16/2017 03:48 AM       Lab Results   Component Value Date/Time    INR 1.0 06/16/2016 12:21 PM    AST (SGOT) 76 03/15/2017 07:56 AM    Alk. phosphatase 60 03/15/2017 07:56 AM    Protein, total 8.2 03/15/2017 07:56 AM    Albumin 4.5 03/15/2017 07:56 AM    Globulin 3.7 03/15/2017 07:56 AM       No results found for: IRON, FE, TIBC, IBCT, PSAT, FERR    No results found for: SR, CRP, KIMBERLY, ANAIGG, RA, RPR, RPRT, VDRLT, VDRLS, TSH, TSHEXT, TSHEXT     No results found for: PH, PHI, PCO2, PCO2I, PO2, PO2I, HCO3, HCO3I, FIO2, FIO2I    Lab Results   Component Value Date/Time    Troponin-I, Qt. <0.04 03/15/2017 10:53 AM        Lab Results   Component Value Date/Time    Culture result: MRSA NOT PRESENT 03/15/2017 11:47 AM    Culture result:  03/15/2017 11:47 AM         Screening of patient nares for MRSA is for surveillance purposes and, if positive, to facilitate isolation considerations in high risk settings. It is not intended for automatic decolonization interventions per se as regimens are not sufficiently effective to warrant routine use.     Culture result: NO GROWTH 3 DAYS 03/15/2017 07:56 AM       No results found for: TOXA1, RPR, HBCM, HBSAG, HAAB, HCAB, HCAB1, HAAT, G6PD, CRYAC, HIVGT, HIVR, HIV1, HIV12, HIVPC, HIVRPI    No results found for: VANCT, CPK    Lab Results   Component Value Date/Time    Color DARK YELLOW 03/15/2017 06:22 PM    Appearance CLEAR 03/15/2017 06:22 PM    Specific gravity >1.030 03/15/2017 06:22 PM    pH (UA) 5.5 03/15/2017 06:22 PM    Protein 30 03/15/2017 06:22 PM    Glucose NEGATIVE  03/15/2017 06:22 PM    Ketone NEGATIVE  03/15/2017 06:22 PM    Bilirubin NEGATIVE  05/31/2016 11:51 AM    Blood NEGATIVE  03/15/2017 06:22 PM Urobilinogen 0.2 03/15/2017 06:22 PM    Nitrites NEGATIVE  03/15/2017 06:22 PM    Leukocyte Esterase NEGATIVE  03/15/2017 06:22 PM    WBC 0-4 03/15/2017 06:22 PM    RBC 0-5 03/15/2017 06:22 PM    Bacteria 1+ 03/15/2017 06:22 PM       Images: personally visualized    Chest CT: Mildly displaced left 10th and 11th posterior rib fractures. No associated pleural effusion   or pneumothorax. Bilateral dependent atelectasis. Small focus of ill-defined airspace disease in the   medial left upper lobe may represent atelectasis or early pneumonia. IMPRESSION  · Acute Hypoxic Respiratory Failure  · Abnormal chest CT: atx in the bases and potential developing pneumonia in the [place]  · Significant Pleuritic Chest Pain: due to left sided 10th & 11th posterior rib fractures s/p ground level fall (POA)  · Thrombocytopenia  · GERD  · Known DARYL: on CPaP at night  · Active Tobacco use    PLAN  · Continue O2 to keep sats > 90%  · CPAP nightly and with naps  · Continue Doxycycline for potential pneumonia   · Pain control with Morphine PCA: Palliative Care following  · Continue Mucomyst nebs; add Duonebs Q6H standing  · Will start low dose Solumedrol today  · Encourage IS and OOB to chair as tolerated  · Encourage pulmonary toilet  · Mucinex  · Bowel regimen  · Daily PT/OT to help with mobilization  · Nutrition: PO diet  · GI prophylaxis: Protonix  · Patient refusing DVT prophylaxis with Lovenox, but agrees to bilateral SCDs      Pt is critically ill and is at high risk for acute decompensation    See my orders for details    My assessment/plan was discussed with: Patient/Wife, Family Practice, Nursing, and Respiratory Therapy.     Total critical care spent exclusive of procedures: 30 minutes    Jake Coto MD, [facility]   [facility]

## 2017-03-18 NOTE — PROGRESS NOTES
0700:Bedside and Verbal shift change report given to [address] (oncoming nurse) by Rylee Rubio (offgoing nurse). Report included the following information SBAR, Kardex, Procedure Summary, Intake/Output, MAR, Med Rec Status and Cardiac Rhythm SR.   0730:Received pt on 70% high flow,  Pt had PTA pump infusing appeared comfortable and sleeping at this time  Lawanda MCFADDEN unable to obtain AM labs,  Yue Norwood RN attempted to draw labs unable to obtain  0857:Dr Anna Maravilla at bedside advised unable to obtain am labs and is ok  1145:Dr. Federico Neff had been at bedside to assess pt, new orders received  1300:pts temp 99.4 tylenol 650 mg given  RT is continuing to treat pt with nebs, states he feels better after treatments and has a productive cough with clear secretions, remains on high flow   1410:pts wife advise hiflow was out of water advised Shannon Dom RT,  Pt 02 sats begain to drop writer went to check on pt and he had taken off hiflow canula and wife had placed him on home cpap machine,  o2 was not connected re-connected o2 to cpap however sats did not increase,  Changed pt to 100% NRB mask advised Shannon Dom RT when he came to replace water to high flow,  Remained on 100% NRB mask o2 sats increase to 89-92%  1600:pts temp decreased to 98.6 after tylenol  1830:pt sitting up in bed eating, pt family had brought in fast food for pt  Bedside and Verbal shift change report given to [facility] (oncoming nurse) by SENAIT MCFADDEN (offgoing nurse).  Report included the following information SBAR, Procedure Summary, Intake/Output, MAR and Cardiac Rhythm SR.

## 2017-03-18 NOTE — PROGRESS NOTES
Daily Progress Note: 3/18/2017  Haritha Lovelace MD    Assessment/Plan:   Acute hypoxemic respiratory failure - POA, unclear etiology. Splinting? Narcotic suppression?   ---Admitted to stepdown   ---CT shows rib fractures x2     Sleep apnea - Resume CPAP     Rib fractures - POA, due to fall.   ---CT to assess for lytic lesions. ---Pain control- now on morphine PCA  ---drug and alcohol screen for fall risk assessment. ---Add Toradol     Hyperlipidemia - Continue rosuvastatin and fenofibrate     GERD (gastroesophageal reflux disease) - Continue PPI     Thrombocytopenia (POA):  resolved.      Tobacco abuse - Provide patch. Advised cessation. Spent 5 minutes in addition to all other time spent on admission, noted below.      Problem List:  Problem List as of 3/18/2017  Date Reviewed: 3/15/2017          Codes Class Noted - Resolved    Acute chest wall pain ICD-10-CM: R07.89  ICD-9-CM: 786.52  3/17/2017 - Present        Cough ICD-10-CM: R05  ICD-9-CM: 786.2  3/17/2017 - Present        SOB (shortness of breath) ICD-10-CM: R06.02  ICD-9-CM: 786.05  3/17/2017 - Present        Therapeutic opioid induced constipation ICD-10-CM: K59.03, T40.2X5A  ICD-9-CM: 564.09, E935.2  3/17/2017 - Present        * (Principal)Acute hypoxemic respiratory failure (HCC) ICD-10-CM: J96.01  ICD-9-CM: 518.81  3/15/2017 - Present        Sleep apnea ICD-10-CM: G47.30  ICD-9-CM: 780.57  Unknown - Present        Hypercholesteremia ICD-10-CM: E78.00  ICD-9-CM: 272.0  Unknown - Present        Rib fractures ICD-10-CM: S22.39XA  ICD-9-CM: 807.00  3/15/2017 - Present        Hyperlipidemia ICD-10-CM: E78.5  ICD-9-CM: 272.4  5/31/2016 - Present        GERD (gastroesophageal reflux disease) ICD-10-CM: K21.9  ICD-9-CM: 530.81  5/31/2016 - Present        RESOLVED: Diverticulitis of intestine with abscess ICD-10-CM: K57.80  ICD-9-CM: 562.11, 569.5  6/16/2016 - 3/15/2017        RESOLVED: Diverticulitis ICD-10-CM: O10.94  ICD-9-CM: 562.11 2016 - 3/15/2017        RESOLVED: Bladder wall thickening ICD-10-CM: N32.89  ICD-9-CM: 596.89  2016 - 3/15/2017        RESOLVED: Hematuria ICD-10-CM: R31.9  ICD-9-CM: 599.70  2016 - 3/15/2017              Subjective:    48 y.o.  male who presented to the Emergency Department complaining of dyspnea. It has progressed over 3 days since a fall in which he injured left ribs. He was placed on PO percocet. DE LA TORRE worsened over days, with cough, inability to clear his thin white sputum, and severe pleuritic pain. ER workup shows severe hypoxia, low platelets, but no signs of PNA. We will admit him for management. (Dr Nacho Greco)    3/16:  He still feels short of breath at rest.  Sats in mid 80s off his CPAP. Better than yesterday but still has some trouble with complete sentences. Still c/o rib pains. He reports any cough or deep breath is painful. D-dimer neg. CT chest pending. 3/17: CT shows rib fractures. Dopplers neg for DVT. Sats are improving. Still having pain. 3/18: Now on Morphine PCA and is more comfortable. Have not been able to draw labs as he is a very hard stick. Oxygen levels have improved. Slept fairly well last night. Review of Systems:   A comprehensive review of systems was negative except for that written in the HPI. Objective:   Physical Exam:     Visit Vitals    /58    Pulse 69    Temp 98.8 °F (37.1 °C)    Resp 19    Ht 5' 7\" (1.702 m)    Wt 211 lb 3.2 oz (95.8 kg)    SpO2 90%    BMI 33.08 kg/m2    O2 Flow Rate (L/min): 30 l/min O2 Device: Hi flow nasal cannula    Temp (24hrs), Av.3 °F (37.4 °C), Min:98.3 °F (36.8 °C), Max:100.4 °F (38 °C)         1901 -  0700  In: 1321.7 [P.O.:240; I.V.:1081.7]  Out: 325 [Urine:325]    General:  Alert, cooperative, no distress, appears stated age. Head:  Normocephalic, without obvious abnormality, atraumatic. Eyes:  Conjunctivae/corneas clear. PERRL, EOMs intact.    Nose:  No drainage or sinus tenderness. Throat: Lips, mucosa, and tongue moist..   Neck: Supple, symmetrical, trachea midline, no adenopathy, thyroid: no enlargement/tenderness/nodules, no carotid bruit and no JVD. No accessory muscle use   Back:   Symmetric, no curvature. ROM normal. No CVA tenderness. Lungs:   rhonchi throughout, breathing less shallow   Chest wall:   tenderness left ant ax line from mid chest down. No deformity. Heart:  Regular rate and rhythm, S1, S2 normal, no murmur, click, rub or gallop. Abdomen:   Soft, non-tender. Bowel sounds normal. No masses,  No organomegaly. Extremities: no cyanosis or edema. No calf tenderness or cords. Pulses: 2+ and symmetric all extremities. Skin: Skin color, texture, turgor normal. No rashes or lesions   Neurologic: CNII-XII intact. Alert and oriented X 3. Fine motor of hands and fingers normal.   equal.  No cogwheeling or rigidity. Gait not tested at this time. Sensation grossly normal to touch. Gross motor of extremities normal.       Data Review:     Venous Duplex LE 3/15/17:  Bilateral lower extremity venous duplex negative for deep  venous thrombosis or thrombophlebitis    Results for Malia King (MRN [id number]) as of 3/16/2017 08:50   Ref.  Range 3/15/2017 10:53 3/15/2017 18:22   AMPHETAMINE Latest Ref Range: NEG    NEGATIVE   BARBITURATES Latest Ref Range: NEG    NEGATIVE   BENZODIAZEPINE Latest Ref Range: NEG    NEGATIVE   COCAINE Latest Ref Range: NEG    NEGATIVE   METHADONE Latest Ref Range: NEG    NEGATIVE   OPIATES Latest Ref Range: NEG    POSITIVE (A)   PCP(PHENCYCLIDINE) Latest Ref Range: NEG    NEGATIVE   THC (TH-CANNABINOL) Latest Ref Range: NEG    POSITIVE (A)   ALCOHOL(ETHYL),SERUM Latest Ref Range: <10 MG/DL <10        Recent Days:  Recent Labs      03/16/17   0348   WBC  4.5   HGB  12.9   HCT  36.8   PLT  151     Recent Labs      03/16/17   0348   NA  137   K  3.5   CL  102   CO2  26   GLU  120*   BUN  17   CREA  1.08   CA  8.9   MG  2.0 Recent Labs      03/15/17   1000   PH  7.40   PCO2  37   PO2  58*   HCO3  22       24 Hour Results:  No results found for this or any previous visit (from the past 24 hour(s)). Medications reviewed  Current Facility-Administered Medications   Medication Dose Route Frequency    ketorolac (TORADOL) injection 30 mg  30 mg IntraVENous Q6H PRN    acetylcysteine (MUCOMYST) 200 mg/mL (20 %) solution 400 mg  2 mL Nebulization BID    morphine injection 8 mg  8 mg IntraVENous Q4H PRN    albuterol (PROVENTIL VENTOLIN) nebulizer solution 2.5 mg  2.5 mg Nebulization Q6H PRN    senna-docusate (PERICOLACE) 8.6-50 mg per tablet 1 Tab  1 Tab Oral BID    morphine (PF)  mg/30 ml   IntraVENous CONTINUOUS    acetaminophen (TYLENOL) tablet 650 mg  650 mg Oral Q6H PRN    diphenhydrAMINE (BENADRYL) injection 25 mg  25 mg IntraVENous Q4H PRN    polyethylene glycol (MIRALAX) packet 17 g  17 g Oral DAILY    lidocaine (LIDODERM) 5 % patch 1 Patch  1 Patch TransDERmal Q24H    fenofibrate nanocrystallized (TRICOR) tablet 145 mg  145 mg Oral DAILY    pantoprazole (PROTONIX) tablet 40 mg  40 mg Oral EVERY OTHER DAY    rosuvastatin (CRESTOR) tablet 10 mg  10 mg Oral DAILY    naloxone (NARCAN) injection 0.4 mg  0.4 mg IntraVENous PRN    0.9% sodium chloride infusion  50 mL/hr IntraVENous CONTINUOUS    HYDROcodone-acetaminophen (NORCO) 5-325 mg per tablet 1 Tab  1 Tab Oral Q4H PRN    ondansetron (ZOFRAN) injection 4 mg  4 mg IntraVENous Q4H PRN    guaiFENesin ER (MUCINEX) tablet 600 mg  600 mg Oral Q12H       Care Plan discussed with: Patient/Family    Total time spent with patient and review of records: 30 minutes.     Amira Werner MD

## 2017-03-18 NOTE — PROGRESS NOTES
0600 - Unable to obtain lab work. Will notify oncoming nurse. Bedside and Verbal shift change report given to Darnell Draper RN (oncoming nurse) by Ludy Blue RN (offgoing nurse). Report included the following information SBAR, Kardex, ED Summary and MAR.

## 2017-03-19 ENCOUNTER — APPOINTMENT (OUTPATIENT)
Dept: GENERAL RADIOLOGY | Age: 47
DRG: 189 | End: 2017-03-19
Attending: INTERNAL MEDICINE
Payer: COMMERCIAL

## 2017-03-19 LAB
ALBUMIN SERPL BCP-MCNC: 3.6 G/DL (ref 3.5–5)
ALBUMIN/GLOB SERPL: 0.9 {RATIO} (ref 1.1–2.2)
ALP SERPL-CCNC: 52 U/L (ref 45–117)
ALT SERPL-CCNC: 39 U/L (ref 12–78)
ANION GAP BLD CALC-SCNC: 13 MMOL/L (ref 5–15)
AST SERPL W P-5'-P-CCNC: 27 U/L (ref 15–37)
BASOPHILS # BLD AUTO: 0 K/UL (ref 0–0.1)
BASOPHILS # BLD: 0 % (ref 0–1)
BILIRUB SERPL-MCNC: 1.1 MG/DL (ref 0.2–1)
BUN SERPL-MCNC: 14 MG/DL (ref 6–20)
BUN/CREAT SERPL: 14 (ref 12–20)
CALCIUM SERPL-MCNC: 9.5 MG/DL (ref 8.5–10.1)
CHLORIDE SERPL-SCNC: 100 MMOL/L (ref 97–108)
CO2 SERPL-SCNC: 23 MMOL/L (ref 21–32)
CREAT SERPL-MCNC: 1 MG/DL (ref 0.7–1.3)
EOSINOPHIL # BLD: 0 K/UL (ref 0–0.4)
EOSINOPHIL NFR BLD: 0 % (ref 0–7)
ERYTHROCYTE [DISTWIDTH] IN BLOOD BY AUTOMATED COUNT: 12.3 % (ref 11.5–14.5)
EST. AVERAGE GLUCOSE BLD GHB EST-MCNC: 126 MG/DL
GLOBULIN SER CALC-MCNC: 4.2 G/DL (ref 2–4)
GLUCOSE SERPL-MCNC: 156 MG/DL (ref 65–100)
HBA1C MFR BLD: 6 % (ref 4.2–6.3)
HCT VFR BLD AUTO: 38.2 % (ref 36.6–50.3)
HGB BLD-MCNC: 13.4 G/DL (ref 12.1–17)
LYMPHOCYTES # BLD AUTO: 8 % (ref 12–49)
LYMPHOCYTES # BLD: 0.7 K/UL (ref 0.8–3.5)
MAGNESIUM SERPL-MCNC: 2.2 MG/DL (ref 1.6–2.4)
MCH RBC QN AUTO: 34.6 PG (ref 26–34)
MCHC RBC AUTO-ENTMCNC: 35.1 G/DL (ref 30–36.5)
MCV RBC AUTO: 98.7 FL (ref 80–99)
MONOCYTES # BLD: 0.5 K/UL (ref 0–1)
MONOCYTES NFR BLD AUTO: 5 % (ref 5–13)
NEUTS SEG # BLD: 8 K/UL (ref 1.8–8)
NEUTS SEG NFR BLD AUTO: 87 % (ref 32–75)
PHOSPHATE SERPL-MCNC: 2.6 MG/DL (ref 2.6–4.7)
PLATELET # BLD AUTO: 156 K/UL (ref 150–400)
POTASSIUM SERPL-SCNC: 4.6 MMOL/L (ref 3.5–5.1)
PROT SERPL-MCNC: 7.8 G/DL (ref 6.4–8.2)
RBC # BLD AUTO: 3.87 M/UL (ref 4.1–5.7)
RBC MORPH BLD: ABNORMAL
SODIUM SERPL-SCNC: 136 MMOL/L (ref 136–145)
WBC # BLD AUTO: 9.2 K/UL (ref 4.1–11.1)

## 2017-03-19 PROCEDURE — 84100 ASSAY OF PHOSPHORUS: CPT | Performed by: INTERNAL MEDICINE

## 2017-03-19 PROCEDURE — 74011250636 HC RX REV CODE- 250/636: Performed by: INTERNAL MEDICINE

## 2017-03-19 PROCEDURE — 74011250637 HC RX REV CODE- 250/637: Performed by: PHYSICIAN ASSISTANT

## 2017-03-19 PROCEDURE — 85025 COMPLETE CBC W/AUTO DIFF WBC: CPT | Performed by: INTERNAL MEDICINE

## 2017-03-19 PROCEDURE — 74011250637 HC RX REV CODE- 250/637: Performed by: STUDENT IN AN ORGANIZED HEALTH CARE EDUCATION/TRAINING PROGRAM

## 2017-03-19 PROCEDURE — 74011250637 HC RX REV CODE- 250/637: Performed by: NURSE PRACTITIONER

## 2017-03-19 PROCEDURE — 83036 HEMOGLOBIN GLYCOSYLATED A1C: CPT | Performed by: FAMILY MEDICINE

## 2017-03-19 PROCEDURE — 83735 ASSAY OF MAGNESIUM: CPT | Performed by: INTERNAL MEDICINE

## 2017-03-19 PROCEDURE — 74011250637 HC RX REV CODE- 250/637: Performed by: INTERNAL MEDICINE

## 2017-03-19 PROCEDURE — 94640 AIRWAY INHALATION TREATMENT: CPT

## 2017-03-19 PROCEDURE — 77010033711 HC HIGH FLOW OXYGEN

## 2017-03-19 PROCEDURE — 74011250637 HC RX REV CODE- 250/637: Performed by: FAMILY MEDICINE

## 2017-03-19 PROCEDURE — 77010033678 HC OXYGEN DAILY

## 2017-03-19 PROCEDURE — 71010 XR CHEST PORT: CPT

## 2017-03-19 PROCEDURE — 80053 COMPREHEN METABOLIC PANEL: CPT | Performed by: INTERNAL MEDICINE

## 2017-03-19 PROCEDURE — 65660000000 HC RM CCU STEPDOWN

## 2017-03-19 PROCEDURE — 74011000250 HC RX REV CODE- 250: Performed by: INTERNAL MEDICINE

## 2017-03-19 PROCEDURE — 36415 COLL VENOUS BLD VENIPUNCTURE: CPT | Performed by: INTERNAL MEDICINE

## 2017-03-19 RX ORDER — IBUPROFEN 200 MG
1 TABLET ORAL DAILY
Status: DISCONTINUED | OUTPATIENT
Start: 2017-03-20 | End: 2017-03-24 | Stop reason: HOSPADM

## 2017-03-19 RX ADMIN — ACETYLCYSTEINE 400 MG: 200 SOLUTION ORAL; RESPIRATORY (INHALATION) at 08:05

## 2017-03-19 RX ADMIN — METHYLPREDNISOLONE SODIUM SUCCINATE 40 MG: 40 INJECTION, POWDER, FOR SOLUTION INTRAMUSCULAR; INTRAVENOUS at 21:59

## 2017-03-19 RX ADMIN — ACETYLCYSTEINE 400 MG: 200 SOLUTION ORAL; RESPIRATORY (INHALATION) at 20:35

## 2017-03-19 RX ADMIN — DIPHENHYDRAMINE HYDROCHLORIDE 25 MG: 50 INJECTION, SOLUTION INTRAMUSCULAR; INTRAVENOUS at 15:42

## 2017-03-19 RX ADMIN — ACETAMINOPHEN 650 MG: 325 TABLET ORAL at 04:15

## 2017-03-19 RX ADMIN — IPRATROPIUM BROMIDE AND ALBUTEROL SULFATE 3 ML: .5; 3 SOLUTION RESPIRATORY (INHALATION) at 01:23

## 2017-03-19 RX ADMIN — FENOFIBRATE 145 MG: 145 TABLET ORAL at 08:28

## 2017-03-19 RX ADMIN — DIPHENHYDRAMINE HYDROCHLORIDE 25 MG: 50 INJECTION, SOLUTION INTRAMUSCULAR; INTRAVENOUS at 08:27

## 2017-03-19 RX ADMIN — IPRATROPIUM BROMIDE AND ALBUTEROL SULFATE 3 ML: .5; 3 SOLUTION RESPIRATORY (INHALATION) at 20:35

## 2017-03-19 RX ADMIN — IPRATROPIUM BROMIDE AND ALBUTEROL SULFATE 3 ML: .5; 3 SOLUTION RESPIRATORY (INHALATION) at 15:25

## 2017-03-19 RX ADMIN — GUAIFENESIN 600 MG: 600 TABLET, EXTENDED RELEASE ORAL at 21:59

## 2017-03-19 RX ADMIN — METHYLPREDNISOLONE SODIUM SUCCINATE 40 MG: 40 INJECTION, POWDER, FOR SOLUTION INTRAMUSCULAR; INTRAVENOUS at 06:05

## 2017-03-19 RX ADMIN — GUAIFENESIN 600 MG: 600 TABLET, EXTENDED RELEASE ORAL at 08:28

## 2017-03-19 RX ADMIN — DOCUSATE SODIUM -SENNOSIDES 1 TABLET: 50; 8.6 TABLET, COATED ORAL at 08:28

## 2017-03-19 RX ADMIN — METHYLPREDNISOLONE SODIUM SUCCINATE 40 MG: 40 INJECTION, POWDER, FOR SOLUTION INTRAMUSCULAR; INTRAVENOUS at 13:49

## 2017-03-19 RX ADMIN — PANTOPRAZOLE SODIUM 40 MG: 40 TABLET, DELAYED RELEASE ORAL at 13:49

## 2017-03-19 RX ADMIN — POLYETHYLENE GLYCOL 3350 17 G: 17 POWDER, FOR SOLUTION ORAL at 09:07

## 2017-03-19 RX ADMIN — ROSUVASTATIN CALCIUM 10 MG: 10 TABLET, FILM COATED ORAL at 08:28

## 2017-03-19 RX ADMIN — IPRATROPIUM BROMIDE AND ALBUTEROL SULFATE 3 ML: .5; 3 SOLUTION RESPIRATORY (INHALATION) at 08:05

## 2017-03-19 NOTE — PROGRESS NOTES
Daily Progress Note: 3/19/2017  Micheal Mosqueda MD    Assessment/Plan:   Acute hypoxemic respiratory failure - POA, unclear etiology. Splinting? Narcotic suppression?   ---Admitted to stepdown   ---CT shows rib fractures x2     Sleep apnea - Resume CPAP     Rib fractures - POA, due to fall.   ---CT of chest without effusions    ---Pain control- now on morphine PCA  ---drug and alcohol screen positive for opiates and THC.  ---Added Toradol     Hyperlipidemia - Continue rosuvastatin and fenofibrate     GERD (gastroesophageal reflux disease) - Continue PPI     Thrombocytopenia (POA):  resolved.      Tobacco abuse - Provide patch. Advised cessation. Spent 5 minutes in addition to all other time spent on admission, noted below.      Problem List:  Problem List as of 3/19/2017  Date Reviewed: 3/15/2017          Codes Class Noted - Resolved    Acute chest wall pain ICD-10-CM: R07.89  ICD-9-CM: 786.52  3/17/2017 - Present        Cough ICD-10-CM: R05  ICD-9-CM: 786.2  3/17/2017 - Present        SOB (shortness of breath) ICD-10-CM: R06.02  ICD-9-CM: 786.05  3/17/2017 - Present        Therapeutic opioid induced constipation ICD-10-CM: K59.03, T40.2X5A  ICD-9-CM: 564.09, E935.2  3/17/2017 - Present        * (Principal)Acute hypoxemic respiratory failure (HCC) ICD-10-CM: J96.01  ICD-9-CM: 518.81  3/15/2017 - Present        Sleep apnea ICD-10-CM: G47.30  ICD-9-CM: 780.57  Unknown - Present        Hypercholesteremia ICD-10-CM: E78.00  ICD-9-CM: 272.0  Unknown - Present        Rib fractures ICD-10-CM: S22.39XA  ICD-9-CM: 807.00  3/15/2017 - Present        Hyperlipidemia ICD-10-CM: E78.5  ICD-9-CM: 272.4  5/31/2016 - Present        GERD (gastroesophageal reflux disease) ICD-10-CM: K21.9  ICD-9-CM: 530.81  5/31/2016 - Present        RESOLVED: Diverticulitis of intestine with abscess ICD-10-CM: K57.80  ICD-9-CM: 562.11, 569.5  6/16/2016 - 3/15/2017        RESOLVED: Diverticulitis ICD-10-CM: R52.19  ICD-9-CM: 562.11  2016 - 3/15/2017        RESOLVED: Bladder wall thickening ICD-10-CM: N32.89  ICD-9-CM: 596.89  2016 - 3/15/2017        RESOLVED: Hematuria ICD-10-CM: R31.9  ICD-9-CM: 599.70  2016 - 3/15/2017              Subjective:    48 y.o.  male who presented to the Emergency Department complaining of dyspnea. It has progressed over 3 days since a fall in which he injured left ribs. He was placed on PO percocet. DE LA TORRE worsened over days, with cough, inability to clear his thin white sputum, and severe pleuritic pain. ER workup shows severe hypoxia, low platelets, but no signs of PNA. We will admit him for management. (Dr Bobby Ruiz)    3/16:  He still feels short of breath at rest.  Sats in mid 80s off his CPAP. Better than yesterday but still has some trouble with complete sentences. Still c/o rib pains. He reports any cough or deep breath is painful. D-dimer neg. CT chest pending. 3/17: CT shows rib fractures. Dopplers neg for DVT. Sats are improving. Still having pain. 3/18: Now on Morphine PCA and is more comfortable. Have not been able to draw labs as he is a very hard stick. Oxygen levels have improved. Slept fairly well last night. 3/19:  Using PCA frequently. Still c/o severe rib pain. Glucose a little high - check A1C. Review of Systems:   A comprehensive review of systems was negative except for that written in the HPI.     Objective:   Physical Exam:     Visit Vitals    /70 (BP 1 Location: Left arm, BP Patient Position: Sitting)    Pulse 88    Temp 98.6 °F (37 °C)    Resp 19    Ht 5' 7\" (1.702 m)    Wt 95.8 kg (211 lb 3.2 oz)    SpO2 91%    BMI 33.08 kg/m2    O2 Flow Rate (L/min): 40 l/min O2 Device: Hi flow nasal cannula    Temp (24hrs), Av °F (37.2 °C), Min:98.6 °F (37 °C), Max:99.4 °F (37.4 °C)    031901 - 700  In: 1010 [P.O.:360; I.V.:650]  Out: 7212 [YVBEB:2211]   701 - 1900  In: 1600 [I.V.:1600]  Out: 2400 [GTVK]    General:  Alert, cooperative, no distress, appears stated age. Head:  Normocephalic, without obvious abnormality, atraumatic. Eyes:  Conjunctivae/corneas clear. PERRL, EOMs intact. Nose:  No drainage or sinus tenderness. Throat: Lips, mucosa, and tongue moist..   Neck: Supple, symmetrical, trachea midline, no adenopathy, thyroid: no enlargement/tenderness/nodules, no carotid bruit and no JVD. No accessory muscle use   Back:   Symmetric, no curvature. ROM normal. No CVA tenderness. Lungs:   rhonchi throughout, breathing less shallow   Chest wall:   tenderness left ant ax line from mid chest down. No deformity. Heart:  Regular rate and rhythm, S1, S2 normal, no murmur, click, rub or gallop. Abdomen:   Soft, non-tender. Bowel sounds normal. No masses,  No organomegaly. Extremities: no cyanosis or edema. No calf tenderness or cords. Pulses: 2+ and symmetric all extremities. Skin: Skin color, texture, turgor normal. No rashes or lesions   Neurologic: CNII-XII intact. Alert and oriented X 3. Fine motor of hands and fingers normal.   equal.  No cogwheeling or rigidity. Gait not tested at this time. Sensation grossly normal to touch. Gross motor of extremities normal.       Data Review:     Venous Duplex LE 3/15/17:  Bilateral lower extremity venous duplex negative for deep  venous thrombosis or thrombophlebitis    Results for Eduardo Delaney (MRN [id number]) as of 3/16/2017 08:50   Ref.  Range 3/15/2017 10:53 3/15/2017 18:22   AMPHETAMINE Latest Ref Range: NEG    NEGATIVE   BARBITURATES Latest Ref Range: NEG    NEGATIVE   BENZODIAZEPINE Latest Ref Range: NEG    NEGATIVE   COCAINE Latest Ref Range: NEG    NEGATIVE   METHADONE Latest Ref Range: NEG    NEGATIVE   OPIATES Latest Ref Range: NEG    POSITIVE (A)   PCP(PHENCYCLIDINE) Latest Ref Range: NEG    NEGATIVE   THC (TH-CANNABINOL) Latest Ref Range: NEG    POSITIVE (A)   ALCOHOL(ETHYL),SERUM Latest Ref Range: <10 MG/DL <10 Recent Days:  Recent Labs      03/19/17   0432   WBC  9.2   HGB  13.4   HCT  38.2   PLT  156     Recent Labs      03/19/17   0432   NA  136   K  4.6   CL  100   CO2  23   GLU  156*   BUN  14   CREA  1.00   CA  9.5   MG  2.2   PHOS  2.6   ALB  3.6   TBILI  1.1*   SGOT  27   ALT  39     No results for input(s): PH, PCO2, PO2, HCO3, FIO2 in the last 72 hours. 24 Hour Results:  Recent Results (from the past 24 hour(s))   MAGNESIUM    Collection Time: 03/19/17  4:32 AM   Result Value Ref Range    Magnesium 2.2 1.6 - 2.4 mg/dL   PHOSPHORUS    Collection Time: 03/19/17  4:32 AM   Result Value Ref Range    Phosphorus 2.6 2.6 - 4.7 MG/DL   METABOLIC PANEL, COMPREHENSIVE    Collection Time: 03/19/17  4:32 AM   Result Value Ref Range    Sodium 136 136 - 145 mmol/L    Potassium 4.6 3.5 - 5.1 mmol/L    Chloride 100 97 - 108 mmol/L    CO2 23 21 - 32 mmol/L    Anion gap 13 5 - 15 mmol/L    Glucose 156 (H) 65 - 100 mg/dL    BUN 14 6 - 20 MG/DL    Creatinine 1.00 0.70 - 1.30 MG/DL    BUN/Creatinine ratio 14 12 - 20      GFR est AA >60 >60 ml/min/1.73m2    GFR est non-AA >60 >60 ml/min/1.73m2    Calcium 9.5 8.5 - 10.1 MG/DL    Bilirubin, total 1.1 (H) 0.2 - 1.0 MG/DL    ALT (SGPT) 39 12 - 78 U/L    AST (SGOT) 27 15 - 37 U/L    Alk. phosphatase 52 45 - 117 U/L    Protein, total 7.8 6.4 - 8.2 g/dL    Albumin 3.6 3.5 - 5.0 g/dL    Globulin 4.2 (H) 2.0 - 4.0 g/dL    A-G Ratio 0.9 (L) 1.1 - 2.2     CBC WITH AUTOMATED DIFF    Collection Time: 03/19/17  4:32 AM   Result Value Ref Range    WBC 9.2 4.1 - 11.1 K/uL    RBC 3.87 (L) 4.10 - 5.70 M/uL    HGB 13.4 12.1 - 17.0 g/dL    HCT 38.2 36.6 - 50.3 %    MCV 98.7 80.0 - 99.0 FL    MCH 34.6 (H) 26.0 - 34.0 PG    MCHC 35.1 30.0 - 36.5 g/dL    RDW 12.3 11.5 - 14.5 %    PLATELET 244 421 - 802 K/uL    NEUTROPHILS 87 (H) 32 - 75 %    LYMPHOCYTES 8 (L) 12 - 49 %    MONOCYTES 5 5 - 13 %    EOSINOPHILS 0 0 - 7 %    BASOPHILS 0 0 - 1 %    ABS. NEUTROPHILS 8.0 1.8 - 8.0 K/UL    ABS.  LYMPHOCYTES 0.7 (L) 0.8 - 3.5 K/UL    ABS. MONOCYTES 0.5 0.0 - 1.0 K/UL    ABS. EOSINOPHILS 0.0 0.0 - 0.4 K/UL    ABS. BASOPHILS 0.0 0.0 - 0.1 K/UL    RBC COMMENTS NORMOCYTIC, NORMOCHROMIC         Medications reviewed  Current Facility-Administered Medications   Medication Dose Route Frequency    albuterol-ipratropium (DUO-NEB) 2.5 MG-0.5 MG/3 ML  3 mL Nebulization Q6H RT    methylPREDNISolone (PF) (SOLU-MEDROL) injection 40 mg  40 mg IntraVENous Q8H    ketorolac (TORADOL) injection 30 mg  30 mg IntraVENous Q6H PRN    acetylcysteine (MUCOMYST) 200 mg/mL (20 %) solution 400 mg  2 mL Nebulization BID    morphine injection 8 mg  8 mg IntraVENous Q4H PRN    albuterol (PROVENTIL VENTOLIN) nebulizer solution 2.5 mg  2.5 mg Nebulization Q6H PRN    senna-docusate (PERICOLACE) 8.6-50 mg per tablet 1 Tab  1 Tab Oral BID    morphine (PF)  mg/30 ml   IntraVENous CONTINUOUS    acetaminophen (TYLENOL) tablet 650 mg  650 mg Oral Q6H PRN    diphenhydrAMINE (BENADRYL) injection 25 mg  25 mg IntraVENous Q4H PRN    polyethylene glycol (MIRALAX) packet 17 g  17 g Oral DAILY    lidocaine (LIDODERM) 5 % patch 1 Patch  1 Patch TransDERmal Q24H    fenofibrate nanocrystallized (TRICOR) tablet 145 mg  145 mg Oral DAILY    pantoprazole (PROTONIX) tablet 40 mg  40 mg Oral EVERY OTHER DAY    rosuvastatin (CRESTOR) tablet 10 mg  10 mg Oral DAILY    naloxone (NARCAN) injection 0.4 mg  0.4 mg IntraVENous PRN    0.9% sodium chloride infusion  50 mL/hr IntraVENous CONTINUOUS    HYDROcodone-acetaminophen (NORCO) 5-325 mg per tablet 1 Tab  1 Tab Oral Q4H PRN    ondansetron (ZOFRAN) injection 4 mg  4 mg IntraVENous Q4H PRN    guaiFENesin ER (MUCINEX) tablet 600 mg  600 mg Oral Q12H       Care Plan discussed with: Patient/Family    Total time spent with patient and review of records: 30 minutes.     Roberto Fay MD

## 2017-03-19 NOTE — PROGRESS NOTES
1930-0700-Overnight update: Continued to encourage pt to use PCA prior to activity and pulmonary toileting. Encouraging cough/deep breathing to expectorate secretions. Pt continues to cough weakly and expectorates small amt of secretions and remains coarse. Pt did not rest well overnight. Appeared anxious w/ high flow O2 on vs CPAP from home. VSS overnight. Wife remained @ bedside overnight. Bedside and Verbal shift change report given to Alex Purcell RN (oncoming nurse) by Darcie Mendoza RN (offgoing nurse). Report included the following information SBAR, Kardex, Intake/Output, MAR, Recent Results and Cardiac Rhythm NSR.

## 2017-03-19 NOTE — PROGRESS NOTES
Name: Darleen Chandra: [address] [place] [address] Dr CANSECO: 1970 Admit Date: 3/15/2017   Phone:   Room: 7814/01   PCP: Haley Hawthorne MD  MRN: [id number]   Date: 3/19/2017  Code: Full Code          Chart and notes reviewed. Data reviewed. I review the patient's current medications in the medical record at each encounter. I have evaluated and examined the patient. Overnight events reviewed:  Afebrile overnight  White count remains normal  On high flow O2 with sats of 92-94%  Pleuritic chest pain improving  --pain control is better with Morphine PCA  Nebs helping  Wheezing has resolved with addition of low dose Solumedrol  Cough is more productive today  Trying to use IS  CXR today demonstrates improvement in bibasilar atelectasis  UDS positive for opiates and THC  ETOH < 10  BLE VD negative for DVT    ROS: Pain control and cough are better today. Still desats quickly off O2 and has significant DE LA TORRE. Denies abd pain. Denies CP. Denies LE pain/swelling.     Current Facility-Administered Medications   Medication Dose Route Frequency    albuterol-ipratropium (DUO-NEB) 2.5 MG-0.5 MG/3 ML  3 mL Nebulization Q6H RT    methylPREDNISolone (PF) (SOLU-MEDROL) injection 40 mg  40 mg IntraVENous Q8H    ketorolac (TORADOL) injection 30 mg  30 mg IntraVENous Q6H PRN    acetylcysteine (MUCOMYST) 200 mg/mL (20 %) solution 400 mg  2 mL Nebulization BID    morphine injection 8 mg  8 mg IntraVENous Q4H PRN    albuterol (PROVENTIL VENTOLIN) nebulizer solution 2.5 mg  2.5 mg Nebulization Q6H PRN    senna-docusate (PERICOLACE) 8.6-50 mg per tablet 1 Tab  1 Tab Oral BID    morphine (PF)  mg/30 ml   IntraVENous CONTINUOUS    acetaminophen (TYLENOL) tablet 650 mg  650 mg Oral Q6H PRN    diphenhydrAMINE (BENADRYL) injection 25 mg  25 mg IntraVENous Q4H PRN    polyethylene glycol (MIRALAX) packet 17 g  17 g Oral DAILY    lidocaine (LIDODERM) 5 % patch 1 Patch  1 Patch TransDERmal Q24H    fenofibrate nanocrystallized (TRICOR) tablet 145 mg  145 mg Oral DAILY    pantoprazole (PROTONIX) tablet 40 mg  40 mg Oral EVERY OTHER DAY    rosuvastatin (CRESTOR) tablet 10 mg  10 mg Oral DAILY    naloxone (NARCAN) injection 0.4 mg  0.4 mg IntraVENous PRN    0.9% sodium chloride infusion  50 mL/hr IntraVENous CONTINUOUS    HYDROcodone-acetaminophen (NORCO) 5-325 mg per tablet 1 Tab  1 Tab Oral Q4H PRN    ondansetron (ZOFRAN) injection 4 mg  4 mg IntraVENous Q4H PRN    guaiFENesin ER (MUCINEX) tablet 600 mg  600 mg Oral Q12H         REVIEW OF SYSTEMS   Negative except as stated in the HPI. Physical Exam:   Visit Vitals    /65    Pulse 84    Temp 98.3 °F (36.8 °C)    Resp 16    Ht 5' 7\" (1.702 m)    Wt 95.8 kg (211 lb 3.2 oz)    SpO2 (!) 88%    BMI 33.08 kg/m2       General:  Alert, cooperative, no acute distress at rest, appears stated age. Head:  Normocephalic, without obvious abnormality, atraumatic. Eyes:  Conjunctivae/corneas clear. Nose: Nares normal. Septum midline. Mucosa normal.    Throat: Lips, mucosa, and tongue normal.    Neck: Supple, symmetrical, trachea midline, no adenopathy. Lungs:   Diminished inspiratory effort and splinting with coarse breath sounds and upper air way rhonchi bilaterally. Chest wall:  Tenderness to palpation laterally on the left. Heart:  Regular rate and rhythm, S1, S2 normal, no murmur, click, rub or gallop. Abdomen:   Soft, non-tender. Bowel sounds normal. No masses,  No organomegaly. Extremities: Extremities normal, atraumatic, no cyanosis or edema. Pulses: 2+ and symmetric all extremities.    Skin: Skin color, texture, turgor normal. No rashes or lesions   Lymph nodes: Cervical, supraclavicular nodes normal.   Neurologic: Grossly nonfocal       Lab Results   Component Value Date/Time    Sodium 136 03/19/2017 04:32 AM    Potassium 4.6 03/19/2017 04:32 AM    Chloride 100 03/19/2017 04:32 AM    CO2 23 03/19/2017 04:32 AM    BUN 14 03/19/2017 04:32 AM    Creatinine 1.00 03/19/2017 04:32 AM    Glucose 156 03/19/2017 04:32 AM    Calcium 9.5 03/19/2017 04:32 AM    Magnesium 2.2 03/19/2017 04:32 AM    Phosphorus 2.6 03/19/2017 04:32 AM    Lactic acid 1.0 03/15/2017 07:56 AM       Lab Results   Component Value Date/Time    WBC 9.2 03/19/2017 04:32 AM    HGB 13.4 03/19/2017 04:32 AM    PLATELET 126 09/27/5988 04:32 AM    MCV 98.7 03/19/2017 04:32 AM       Lab Results   Component Value Date/Time    INR 1.0 06/16/2016 12:21 PM    AST (SGOT) 27 03/19/2017 04:32 AM    Alk. phosphatase 52 03/19/2017 04:32 AM    Protein, total 7.8 03/19/2017 04:32 AM    Albumin 3.6 03/19/2017 04:32 AM    Globulin 4.2 03/19/2017 04:32 AM       No results found for: IRON, FE, TIBC, IBCT, PSAT, FERR    No results found for: SR, CRP, KIMBERLY, ANAIGG, RA, RPR, RPRT, VDRLT, VDRLS, TSH, TSHEXT, TSHEXT     No results found for: PH, PHI, PCO2, PCO2I, PO2, PO2I, HCO3, HCO3I, FIO2, FIO2I    Lab Results   Component Value Date/Time    Troponin-I, Qt. <0.04 03/15/2017 10:53 AM        Lab Results   Component Value Date/Time    Culture result: MRSA NOT PRESENT 03/15/2017 11:47 AM    Culture result:  03/15/2017 11:47 AM         Screening of patient nares for MRSA is for surveillance purposes and, if positive, to facilitate isolation considerations in high risk settings. It is not intended for automatic decolonization interventions per se as regimens are not sufficiently effective to warrant routine use.     Culture result: NO GROWTH 4 DAYS 03/15/2017 07:56 AM       No results found for: TOXA1, RPR, HBCM, HBSAG, HAAB, HCAB, HCAB1, HAAT, G6PD, CRYAC, HIVGT, HIVR, HIV1, HIV12, HIVPC, HIVRPI    No results found for: VANCT, CPK    Lab Results   Component Value Date/Time    Color DARK YELLOW 03/15/2017 06:22 PM    Appearance CLEAR 03/15/2017 06:22 PM    Specific gravity >1.030 03/15/2017 06:22 PM    pH (UA) 5.5 03/15/2017 06:22 PM    Protein 30 03/15/2017 06:22 PM    Glucose NEGATIVE  03/15/2017 06:22 PM    Ketone NEGATIVE  03/15/2017 06:22 PM    Bilirubin NEGATIVE  05/31/2016 11:51 AM    Blood NEGATIVE  03/15/2017 06:22 PM    Urobilinogen 0.2 03/15/2017 06:22 PM    Nitrites NEGATIVE  03/15/2017 06:22 PM    Leukocyte Esterase NEGATIVE  03/15/2017 06:22 PM    WBC 0-4 03/15/2017 06:22 PM    RBC 0-5 03/15/2017 06:22 PM    Bacteria 1+ 03/15/2017 06:22 PM       Images: personally visualized    Chest CT: Mildly displaced left 10th and 11th posterior rib fractures. No associated pleural effusion   or pneumothorax. Bilateral dependent atelectasis. Small focus of ill-defined airspace disease in the   medial left upper lobe may represent atelectasis or early pneumonia. IMPRESSION  · Acute Hypoxic Respiratory Failure  · Abnormal chest CT: atx in the bases and potential developing pneumonia in the VA[place]VI  · Significant Pleuritic Chest Pain: due to left sided 10th & 11th posterior rib fractures s/p ground level fall (POA)  · Thrombocytopenia  · GERD  · Known DARYL: on CPaP at night  · Active Tobacco use    PLAN  · Continue O2 to keep sats > 90%  · CPAP nightly and with naps  · Continue Doxycycline for potential pneumonia   · Pain control with Morphine PCA: Palliative Care following  · Continue Mucomyst nebs and Duonebs Q6H standing  · Continue low dose Solumedrol   · Encourage IS and OOB to chair as tolerated  · Encourage pulmonary toilet  · Mucinex  · Bowel regimen  · Daily PT/OT to help with mobilization  · Nutrition: PO diet  · GI prophylaxis: Protonix  · Patient refusing DVT prophylaxis with Lovenox, but agrees to bilateral SCDs  · Dispo: ok to go out of the IVCU to the Stepdown Unit today      Pt is critically ill and is at high risk for acute decompensation    See my orders for details    My assessment/plan was discussed with: Patient/Wife, Family Practice, Nursing, and Respiratory Therapy.     Total critical care spent exclusive of procedures: 30 minutes    Marely Causey MD, [facility]   [facility]

## 2017-03-19 NOTE — PROGRESS NOTES
Bedside and Verbal shift change report given to Aleksey Garcia (oncoming nurse) by Jodi Gomez (offgoing nurse). Report included the following information SBAR, Intake/Output, Accordion and Cardiac Rhythm SR.   0710:DR. Mellissa Hernandez in to round on pt, no new orders received  0900:pt continues to use PCA pump and is getting much better pain control today,  Encouraged to continue using incentive spirometer ever hour to and deep breath and cough  Pt breath sounds are course much improved from 3/18, rt continues with treatments  1115:resting in bed family at bedside  1300:no complaints   1530:pt had been up to toilet and had large BM   1700: sleeping and appears comfortable  Pt refused hospital meals during shift ate food brought in from wife  1900:report given to [employer]

## 2017-03-20 ENCOUNTER — APPOINTMENT (OUTPATIENT)
Dept: GENERAL RADIOLOGY | Age: 47
DRG: 189 | End: 2017-03-20
Attending: INTERNAL MEDICINE
Payer: COMMERCIAL

## 2017-03-20 LAB
ALBUMIN SERPL BCP-MCNC: 3.4 G/DL (ref 3.5–5)
ALBUMIN/GLOB SERPL: 0.8 {RATIO} (ref 1.1–2.2)
ALP SERPL-CCNC: 52 U/L (ref 45–117)
ALT SERPL-CCNC: 24 U/L (ref 12–78)
ANION GAP BLD CALC-SCNC: 12 MMOL/L (ref 5–15)
AST SERPL W P-5'-P-CCNC: 23 U/L (ref 15–37)
BACTERIA SPEC CULT: NORMAL
BASOPHILS # BLD AUTO: 0 K/UL (ref 0–0.1)
BASOPHILS # BLD: 0 % (ref 0–1)
BILIRUB SERPL-MCNC: 0.5 MG/DL (ref 0.2–1)
BNP SERPL-MCNC: 110 PG/ML (ref 0–125)
BUN SERPL-MCNC: 19 MG/DL (ref 6–20)
BUN/CREAT SERPL: 19 (ref 12–20)
CALCIUM SERPL-MCNC: 9.8 MG/DL (ref 8.5–10.1)
CHLORIDE SERPL-SCNC: 102 MMOL/L (ref 97–108)
CO2 SERPL-SCNC: 22 MMOL/L (ref 21–32)
CREAT SERPL-MCNC: 1.01 MG/DL (ref 0.7–1.3)
EOSINOPHIL # BLD: 0 K/UL (ref 0–0.4)
EOSINOPHIL NFR BLD: 0 % (ref 0–7)
ERYTHROCYTE [DISTWIDTH] IN BLOOD BY AUTOMATED COUNT: 12.3 % (ref 11.5–14.5)
GLOBULIN SER CALC-MCNC: 4.3 G/DL (ref 2–4)
GLUCOSE SERPL-MCNC: 154 MG/DL (ref 65–100)
HCT VFR BLD AUTO: 37.6 % (ref 36.6–50.3)
HGB BLD-MCNC: 12.3 G/DL (ref 12.1–17)
LYMPHOCYTES # BLD AUTO: 7 % (ref 12–49)
LYMPHOCYTES # BLD: 0.8 K/UL (ref 0.8–3.5)
MAGNESIUM SERPL-MCNC: 2.5 MG/DL (ref 1.6–2.4)
MCH RBC QN AUTO: 33 PG (ref 26–34)
MCHC RBC AUTO-ENTMCNC: 32.7 G/DL (ref 30–36.5)
MCV RBC AUTO: 100.8 FL (ref 80–99)
MONOCYTES # BLD: 0.6 K/UL (ref 0–1)
MONOCYTES NFR BLD AUTO: 6 % (ref 5–13)
NEUTS SEG # BLD: 9.5 K/UL (ref 1.8–8)
NEUTS SEG NFR BLD AUTO: 87 % (ref 32–75)
PHOSPHATE SERPL-MCNC: 3.3 MG/DL (ref 2.6–4.7)
PLATELET # BLD AUTO: 187 K/UL (ref 150–400)
POTASSIUM SERPL-SCNC: 4.8 MMOL/L (ref 3.5–5.1)
PROT SERPL-MCNC: 7.7 G/DL (ref 6.4–8.2)
RBC # BLD AUTO: 3.73 M/UL (ref 4.1–5.7)
SERVICE CMNT-IMP: NORMAL
SODIUM SERPL-SCNC: 136 MMOL/L (ref 136–145)
WBC # BLD AUTO: 10.9 K/UL (ref 4.1–11.1)

## 2017-03-20 PROCEDURE — 77010033711 HC HIGH FLOW OXYGEN

## 2017-03-20 PROCEDURE — 74011000250 HC RX REV CODE- 250: Performed by: INTERNAL MEDICINE

## 2017-03-20 PROCEDURE — 85025 COMPLETE CBC W/AUTO DIFF WBC: CPT | Performed by: INTERNAL MEDICINE

## 2017-03-20 PROCEDURE — 77030018846 HC SOL IRR STRL H20 ICUM -A

## 2017-03-20 PROCEDURE — 94640 AIRWAY INHALATION TREATMENT: CPT

## 2017-03-20 PROCEDURE — 84100 ASSAY OF PHOSPHORUS: CPT | Performed by: INTERNAL MEDICINE

## 2017-03-20 PROCEDURE — 74011250636 HC RX REV CODE- 250/636: Performed by: NURSE PRACTITIONER

## 2017-03-20 PROCEDURE — 74011250636 HC RX REV CODE- 250/636: Performed by: INTERNAL MEDICINE

## 2017-03-20 PROCEDURE — 74011250637 HC RX REV CODE- 250/637: Performed by: INTERNAL MEDICINE

## 2017-03-20 PROCEDURE — 97116 GAIT TRAINING THERAPY: CPT

## 2017-03-20 PROCEDURE — 74011250637 HC RX REV CODE- 250/637: Performed by: STUDENT IN AN ORGANIZED HEALTH CARE EDUCATION/TRAINING PROGRAM

## 2017-03-20 PROCEDURE — 71010 XR CHEST PORT: CPT

## 2017-03-20 PROCEDURE — 36415 COLL VENOUS BLD VENIPUNCTURE: CPT | Performed by: INTERNAL MEDICINE

## 2017-03-20 PROCEDURE — 74011250637 HC RX REV CODE- 250/637: Performed by: NURSE PRACTITIONER

## 2017-03-20 PROCEDURE — 74011250637 HC RX REV CODE- 250/637: Performed by: PHYSICIAN ASSISTANT

## 2017-03-20 PROCEDURE — 97530 THERAPEUTIC ACTIVITIES: CPT

## 2017-03-20 PROCEDURE — 80053 COMPREHEN METABOLIC PANEL: CPT | Performed by: INTERNAL MEDICINE

## 2017-03-20 PROCEDURE — 83735 ASSAY OF MAGNESIUM: CPT | Performed by: INTERNAL MEDICINE

## 2017-03-20 PROCEDURE — 65660000000 HC RM CCU STEPDOWN

## 2017-03-20 PROCEDURE — 83880 ASSAY OF NATRIURETIC PEPTIDE: CPT | Performed by: INTERNAL MEDICINE

## 2017-03-20 RX ORDER — BUMETANIDE 0.25 MG/ML
0.5 INJECTION INTRAMUSCULAR; INTRAVENOUS 2 TIMES DAILY
Status: DISCONTINUED | OUTPATIENT
Start: 2017-03-20 | End: 2017-03-21

## 2017-03-20 RX ADMIN — IPRATROPIUM BROMIDE AND ALBUTEROL SULFATE 3 ML: .5; 3 SOLUTION RESPIRATORY (INHALATION) at 20:12

## 2017-03-20 RX ADMIN — IPRATROPIUM BROMIDE AND ALBUTEROL SULFATE 3 ML: .5; 3 SOLUTION RESPIRATORY (INHALATION) at 07:23

## 2017-03-20 RX ADMIN — GUAIFENESIN 600 MG: 600 TABLET, EXTENDED RELEASE ORAL at 22:02

## 2017-03-20 RX ADMIN — ACETYLCYSTEINE 400 MG: 200 SOLUTION ORAL; RESPIRATORY (INHALATION) at 07:23

## 2017-03-20 RX ADMIN — METHYLPREDNISOLONE SODIUM SUCCINATE 40 MG: 40 INJECTION, POWDER, FOR SOLUTION INTRAMUSCULAR; INTRAVENOUS at 14:09

## 2017-03-20 RX ADMIN — FENOFIBRATE 145 MG: 145 TABLET ORAL at 09:26

## 2017-03-20 RX ADMIN — POLYETHYLENE GLYCOL 3350 17 G: 17 POWDER, FOR SOLUTION ORAL at 09:27

## 2017-03-20 RX ADMIN — Medication: at 21:29

## 2017-03-20 RX ADMIN — METHYLPREDNISOLONE SODIUM SUCCINATE 40 MG: 40 INJECTION, POWDER, FOR SOLUTION INTRAMUSCULAR; INTRAVENOUS at 05:50

## 2017-03-20 RX ADMIN — DIPHENHYDRAMINE HYDROCHLORIDE 25 MG: 50 INJECTION, SOLUTION INTRAMUSCULAR; INTRAVENOUS at 14:12

## 2017-03-20 RX ADMIN — DOCUSATE SODIUM -SENNOSIDES 1 TABLET: 50; 8.6 TABLET, COATED ORAL at 18:31

## 2017-03-20 RX ADMIN — DOCUSATE SODIUM -SENNOSIDES 1 TABLET: 50; 8.6 TABLET, COATED ORAL at 09:26

## 2017-03-20 RX ADMIN — ACETYLCYSTEINE 400 MG: 200 SOLUTION ORAL; RESPIRATORY (INHALATION) at 20:12

## 2017-03-20 RX ADMIN — SODIUM CHLORIDE 50 ML/HR: 900 INJECTION, SOLUTION INTRAVENOUS at 07:35

## 2017-03-20 RX ADMIN — ROSUVASTATIN CALCIUM 10 MG: 10 TABLET, FILM COATED ORAL at 09:27

## 2017-03-20 RX ADMIN — DIPHENHYDRAMINE HYDROCHLORIDE 25 MG: 50 INJECTION, SOLUTION INTRAMUSCULAR; INTRAVENOUS at 22:13

## 2017-03-20 RX ADMIN — IPRATROPIUM BROMIDE AND ALBUTEROL SULFATE 3 ML: .5; 3 SOLUTION RESPIRATORY (INHALATION) at 13:26

## 2017-03-20 RX ADMIN — GUAIFENESIN 600 MG: 600 TABLET, EXTENDED RELEASE ORAL at 09:27

## 2017-03-20 RX ADMIN — BUMETANIDE 0.5 MG: 0.25 INJECTION, SOLUTION INTRAMUSCULAR; INTRAVENOUS at 09:27

## 2017-03-20 RX ADMIN — BUMETANIDE 0.5 MG: 0.25 INJECTION, SOLUTION INTRAMUSCULAR; INTRAVENOUS at 18:31

## 2017-03-20 RX ADMIN — METHYLPREDNISOLONE SODIUM SUCCINATE 40 MG: 40 INJECTION, POWDER, FOR SOLUTION INTRAMUSCULAR; INTRAVENOUS at 22:00

## 2017-03-20 RX ADMIN — IPRATROPIUM BROMIDE AND ALBUTEROL SULFATE 3 ML: .5; 3 SOLUTION RESPIRATORY (INHALATION) at 01:28

## 2017-03-20 NOTE — PROGRESS NOTES
Vat Note:  1300 Order placed for midline. OK given for ultrasound PIV. Ultrasound PIV placed in left cephalic, positive blood return.

## 2017-03-20 NOTE — PROGRESS NOTES
TRANSFER - IN REPORT:    Verbal report received from Sai Medina (name) on Soniya Baptiste  being received from ICU (unit) for routine progression of care      Report consisted of patients Situation, Background, Assessment and   Recommendations(SBAR). Information from the following report(s) SBAR, Kardex, Intake/Output and Cardiac Rhythm NSR was reviewed with the receiving nurse. Opportunity for questions and clarification was provided. Assessment completed upon patients arrival to unit and care assumed. Tony score = 20    Dual skin assessment performed by Adam Costa (primary nurse) and Tim Hernández (secondary nurse). Incision scar from recent surgical hernia repair on lower abdomen, well approximated. Old scars from laparoscopic surgery on abdomen, and small abraison on left hand. No open sores or lesions. Oxygen saturations remained in the low 90's with 6 liters via nasal cannula and cpap overnight. 0710  Bedside and Verbal shift change report given to [facility] (oncoming nurse) by Gemini Acosta (offgoing nurse). Report included the following information SBAR, Kardex, MAR and Cardiac Rhythm NSR/SB.

## 2017-03-20 NOTE — PROGRESS NOTES
Name: Kimberly Mend: [address]   : 1970 Admit Date: 3/15/2017   Phone:   Room: 6234/01   PCP: Olga Mora MD  MRN: [id number]   Date: 3/20/2017  Code: Full Code          Chart and notes reviewed. Data reviewed. I review the patient's current medications in the medical record at each encounter. I have evaluated and examined the patient. Overnight events  Afebrile  Sats 99% on high flow 45L 90%  proBNP 110  CXR stable      ROS: Pain control improved. Cough is better. Still desats quickly off O2 and has significant DE LA TORRE. Denies abd pain. Denies CP. Denies LE pain/swelling.     Current Facility-Administered Medications   Medication Dose Route Frequency    nicotine (NICODERM CQ) 21 mg/24 hr patch 1 Patch  1 Patch TransDERmal DAILY    albuterol-ipratropium (DUO-NEB) 2.5 MG-0.5 MG/3 ML  3 mL Nebulization Q6H RT    methylPREDNISolone (PF) (SOLU-MEDROL) injection 40 mg  40 mg IntraVENous Q8H    ketorolac (TORADOL) injection 30 mg  30 mg IntraVENous Q6H PRN    acetylcysteine (MUCOMYST) 200 mg/mL (20 %) solution 400 mg  2 mL Nebulization BID    morphine injection 8 mg  8 mg IntraVENous Q4H PRN    albuterol (PROVENTIL VENTOLIN) nebulizer solution 2.5 mg  2.5 mg Nebulization Q6H PRN    senna-docusate (PERICOLACE) 8.6-50 mg per tablet 1 Tab  1 Tab Oral BID    morphine (PF)  mg/30 ml   IntraVENous CONTINUOUS    acetaminophen (TYLENOL) tablet 650 mg  650 mg Oral Q6H PRN    diphenhydrAMINE (BENADRYL) injection 25 mg  25 mg IntraVENous Q4H PRN    polyethylene glycol (MIRALAX) packet 17 g  17 g Oral DAILY    lidocaine (LIDODERM) 5 % patch 1 Patch  1 Patch TransDERmal Q24H    fenofibrate nanocrystallized (TRICOR) tablet 145 mg  145 mg Oral DAILY    pantoprazole (PROTONIX) tablet 40 mg  40 mg Oral EVERY OTHER DAY    rosuvastatin (CRESTOR) tablet 10 mg  10 mg Oral DAILY    naloxone (NARCAN) injection 0.4 mg  0.4 mg IntraVENous PRN    0.9% sodium chloride infusion 50 mL/hr IntraVENous CONTINUOUS    HYDROcodone-acetaminophen (NORCO) 5-325 mg per tablet 1 Tab  1 Tab Oral Q4H PRN    ondansetron (ZOFRAN) injection 4 mg  4 mg IntraVENous Q4H PRN    guaiFENesin ER (MUCINEX) tablet 600 mg  600 mg Oral Q12H         REVIEW OF SYSTEMS   Negative except as stated in the HPI. Physical Exam:   Visit Vitals    /70    Pulse 65    Temp 98.7 °F (37.1 °C)    Resp 22    Ht 5' 7\" (1.702 m)    Wt 95.8 kg (211 lb 3.2 oz)    SpO2 99%    BMI 33.08 kg/m2       General:  Alert, cooperative, no acute distress at rest, appears stated age. Head:  Normocephalic, without obvious abnormality, atraumatic. Eyes:  Conjunctivae/corneas clear. Nose: Nares normal. Septum midline. Mucosa normal.    Throat: Lips, mucosa, and tongue normal.    Neck: Supple, symmetrical, trachea midline, no adenopathy. Lungs:   Improved air flow and clearer lung sounds. Chest wall:  Tenderness to palpation laterally on the left. Heart:  Regular rate and rhythm, S1, S2 normal, no murmur, click, rub or gallop. Abdomen:   Soft, non-tender. Bowel sounds normal. No masses,  No organomegaly. Extremities: Extremities normal, atraumatic, no cyanosis or edema. Pulses: 2+ and symmetric all extremities.    Skin: Skin color, texture, turgor normal. No rashes or lesions   Lymph nodes: Cervical, supraclavicular nodes normal.   Neurologic: Grossly nonfocal       Lab Results   Component Value Date/Time    Sodium 136 03/20/2017 04:29 AM    Potassium 4.8 03/20/2017 04:29 AM    Chloride 102 03/20/2017 04:29 AM    CO2 22 03/20/2017 04:29 AM    BUN 19 03/20/2017 04:29 AM    Creatinine 1.01 03/20/2017 04:29 AM    Glucose 154 03/20/2017 04:29 AM    Calcium 9.8 03/20/2017 04:29 AM    Magnesium 2.5 03/20/2017 04:29 AM    Phosphorus 3.3 03/20/2017 04:29 AM    Lactic acid 1.0 03/15/2017 07:56 AM       Lab Results   Component Value Date/Time    WBC 10.9 03/20/2017 04:29 AM    HGB 12.3 03/20/2017 04:29 AM    PLATELET 872 03/20/2017 04:29 AM    .8 03/20/2017 04:29 AM       Lab Results   Component Value Date/Time    INR 1.0 06/16/2016 12:21 PM    AST (SGOT) 23 03/20/2017 04:29 AM    Alk. phosphatase 52 03/20/2017 04:29 AM    Protein, total 7.7 03/20/2017 04:29 AM    Albumin 3.4 03/20/2017 04:29 AM    Globulin 4.3 03/20/2017 04:29 AM       No results found for: IRON, FE, TIBC, IBCT, PSAT, FERR    No results found for: SR, CRP, KIMBERLY, ANAIGG, RA, RPR, RPRT, VDRLT, VDRLS, TSH, TSHEXT, TSHEXT     No results found for: PH, PHI, PCO2, PCO2I, PO2, PO2I, HCO3, HCO3I, FIO2, FIO2I    Lab Results   Component Value Date/Time    Troponin-I, Qt. <0.04 03/15/2017 10:53 AM        Lab Results   Component Value Date/Time    Culture result: MRSA NOT PRESENT 03/15/2017 11:47 AM    Culture result:  03/15/2017 11:47 AM         Screening of patient nares for MRSA is for surveillance purposes and, if positive, to facilitate isolation considerations in high risk settings. It is not intended for automatic decolonization interventions per se as regimens are not sufficiently effective to warrant routine use.     Culture result: NO GROWTH 4 DAYS 03/15/2017 07:56 AM       No results found for: TOXA1, RPR, HBCM, HBSAG, HAAB, HCAB, HCAB1, HAAT, G6PD, CRYAC, HIVGT, HIVR, HIV1, HIV12, HIVPC, HIVRPI    No results found for: VANCT, CPK    Lab Results   Component Value Date/Time    Color DARK YELLOW 03/15/2017 06:22 PM    Appearance CLEAR 03/15/2017 06:22 PM    Specific gravity >1.030 03/15/2017 06:22 PM    pH (UA) 5.5 03/15/2017 06:22 PM    Protein 30 03/15/2017 06:22 PM    Glucose NEGATIVE  03/15/2017 06:22 PM    Ketone NEGATIVE  03/15/2017 06:22 PM    Bilirubin NEGATIVE  05/31/2016 11:51 AM    Blood NEGATIVE  03/15/2017 06:22 PM    Urobilinogen 0.2 03/15/2017 06:22 PM    Nitrites NEGATIVE  03/15/2017 06:22 PM    Leukocyte Esterase NEGATIVE  03/15/2017 06:22 PM    WBC 0-4 03/15/2017 06:22 PM    RBC 0-5 03/15/2017 06:22 PM    Bacteria 1+ 03/15/2017 06:22 PM Images: personally visualized    Chest CT (3/20/17): Bibasilar airspace disease favoring atelectasis     IMPRESSION  · Acute hypoxic respiratory failure  · Severe dyspnea: improved  · Abnormal chest CT: atx in the bases and potential developing pneumonia in the [place]  · Significant chest pain secondary to left rib fractures s/p fall (POA)  · Thrombocytopenia: improved  · GERD  · DARYL  · Tobacco use    PLAN  · Wean FiO2 to keep sats > 90%; transition to NC as able  · CPAP nightly and with naps  · Continue Doxy for potential pneumonia  · Continue low dose Solumedrol  · Pain control per Palliative Care: assistance appreciated  · Encourage IS and OOB to chair as tolerated  · Encourage pulmonary toilet  · Mucinex  · Bowel regimen  · PT/OT, OOD, and encourage IS unse  · Patient refusing DVT prophylaxis with Lovenox, but agrees to SCDs  · GI prophylaxis: Protonix  · Awaiting transfer to stepdown    Pt is critically ill and is at high risk for acute decompensation due to persistent hypoxia requiring continuous high flow O2.  CC time EOP 30 min.     Mario Alberto Malone

## 2017-03-20 NOTE — PROGRESS NOTES
Problem: Mobility Impaired (Adult and Pediatric)  Goal: *Therapy Goal (Edit Goal, Insert Text)  Problem: Mobility Impaired (Adult and Pediatric)  Goal: *Therapy Goal (Edit Goal, Insert Text)  Physical Therapy Goals  Initiated 3/17/2017  1. Patient will move from supine to side lying (log roll) and side lying to sit in bed with minimal assistance/contact guard assist within 7 day(s). 2. Patient will transfer from bed to chair and chair to bed with supervision/set-up using the least restrictive device within 7 day(s). 3. Patient will perform sit to stand with independence within 7 day(s). 4. Patient will ambulate with independence for 100 feet with the least restrictive device within 7 day(s). 5. Patient will ascend/descend 3 stairs with no handrail(s) with supervision/set-up within 7 day(s).     PHYSICAL THERAPY TREATMENT  Patient: Maryse Estrada (7 y.o. male)  Date: 3/20/2017  Diagnosis: Acute hypoxemic respiratory failure (HCC) Acute hypoxemic respiratory failure (HCC)       Precautions:        ASSESSMENT:  Based on the objective data described below, patient tolerated treatment very well. Ambulate without assistive device on the IVCU hallway SBA, slow pace gait, no loss of balance. OOB to chair as tolerated performed some active range of motion exercise on both LE all planes. Family in the room during the entire therapy session and agreed with all goals set for the patient. Oxygen saturations ranges from 95% to 87% with activity on 6 liter oxygen. Notified nurse who agreed to monitor and assist back to bed when ask. Progression toward goals:  [X]    Improving appropriately and progressing toward goals  [ ]    Improving slowly and progressing toward goals  [ ]    Not making progress toward goals and plan of care will be adjusted       PLAN:  Patient continues to benefit from skilled intervention to address the above impairments. Continue treatment per established plan of care.   Discharge Recommendations: Home Health and To Be Determined  Further Equipment Recommendations for Discharge:  TBD       SUBJECTIVE:   Patient stated Ok.       OBJECTIVE DATA SUMMARY:   Critical Behavior:  Neurologic State: Alert  Orientation Level: Oriented X4  Cognition: Follows commands  Safety/Judgement: Awareness of environment  Functional Mobility Training:  Bed Mobility:  Rolling: Contact guard assistance  Supine to Sit: Contact guard assistance  Sit to Supine: Contact guard assistance  Scooting: Contact guard assistance        Transfers:  Sit to Stand: Stand-by asssistance  Stand to Sit: Stand-by asssistance  Stand Pivot Transfers: Stand-by asssistance     Bed to Chair: Stand-by asssistance                    Balance:  Sitting: Intact  Standing: Intact; Without support  Ambulation/Gait Training:  Distance (ft): 50 Feet (ft)     Ambulation - Level of Assistance: Stand-by asssistance     Gait Description (WDL): Exceptions to WDL  Gait Abnormalities: Path deviations        Base of Support: Narrowed     Speed/Elsa: Pace decreased (<100 feet/min)                 Therapeutic Exercises:    Instructed patient to continue active range of motion exercise on both legs while up on chair or on bed. Pain:  Pain Scale 1: Numeric (0 - 10)  Pain Intensity 1: 0              Activity Tolerance:   Good. Please refer to the flowsheet for vital signs taken during this treatment. After treatment:   [X]    Patient left in no apparent distress sitting up in chair  [ ]    Patient left in no apparent distress in bed  [X]    Call bell left within reach  [X]    Nursing notified  [X]    Caregiver present  [ ]    Bed alarm activated      COMMUNICATION/COLLABORATION:   The patients plan of care was discussed with: Registered Nurse and patient     Jessika Gillette, PT,WCC.    Time Calculation: 24 mins

## 2017-03-20 NOTE — PROGRESS NOTES
0700: Bedside SBAR report received from LESA Barry. Labs and plan of care reviewed at this time. Pt stable. ADLs addressed. Call light within reach, bed exit alarm on. Will continue to monitor. 0800: Initial assessment completed. 0915: Dr. Piedra Cancer at bedside  8815: Patient placed on 6L NC. Will continue to monitor. 1023: Spoke with Dr. Piedra Cancer. Ok to place a midline  1130: PT working with patient  1928:TRANSFER - OUT REPORT:    Verbal report given to Cherie Cardoza  being received from ICU for routine progression of care      Report consisted of patients Situation, Background, Assessment and   Recommendations(SBAR). Information from the following report(s) SBAR, MAR and Cardiac Rhythm SR was reviewed with the receiving nurse. Opportunity for questions and clarification was provided.

## 2017-03-20 NOTE — PROGRESS NOTES
Daily Progress Note: 3/20/2017  Dima Galarza MD    Assessment/Plan:   Acute hypoxemic respiratory failure - POA, unclear etiology. Splinting? Narcotic suppression? - improving slowly  ---CT shows rib fractures x2     Sleep apnea - Resumed CPAP     Rib fractures - POA, due to fall.   ---CT of chest without effusions    ---Pain control- now on morphine PCA  ---drug and alcohol screen positive for opiates and THC.  ---Added Toradol     Hyperlipidemia - Continue rosuvastatin and fenofibrate     GERD (gastroesophageal reflux disease) - Continue PPI     Thrombocytopenia (POA):  resolved.      Tobacco abuse - Provide patch. Advised cessation. Spent 5 minutes in addition to all other time spent on admission, noted below.      Problem List:  Problem List as of 3/20/2017  Date Reviewed: 3/15/2017          Codes Class Noted - Resolved    Acute chest wall pain ICD-10-CM: R07.89  ICD-9-CM: 786.52  3/17/2017 - Present        Cough ICD-10-CM: R05  ICD-9-CM: 786.2  3/17/2017 - Present        SOB (shortness of breath) ICD-10-CM: R06.02  ICD-9-CM: 786.05  3/17/2017 - Present        Therapeutic opioid induced constipation ICD-10-CM: K59.03, T40.2X5A  ICD-9-CM: 564.09, E935.2  3/17/2017 - Present        * (Principal)Acute hypoxemic respiratory failure (HCC) ICD-10-CM: J96.01  ICD-9-CM: 518.81  3/15/2017 - Present        Sleep apnea ICD-10-CM: G47.30  ICD-9-CM: 780.57  Unknown - Present        Hypercholesteremia ICD-10-CM: E78.00  ICD-9-CM: 272.0  Unknown - Present        Rib fractures ICD-10-CM: S22.39XA  ICD-9-CM: 807.00  3/15/2017 - Present        Hyperlipidemia ICD-10-CM: E78.5  ICD-9-CM: 272.4  5/31/2016 - Present        GERD (gastroesophageal reflux disease) ICD-10-CM: K21.9  ICD-9-CM: 530.81  5/31/2016 - Present        RESOLVED: Diverticulitis of intestine with abscess ICD-10-CM: K57.80  ICD-9-CM: 562.11, 569.5  6/16/2016 - 3/15/2017        RESOLVED: Diverticulitis ICD-10-CM: S12.90  ICD-9-CM: 562.11 2016 - 3/15/2017        RESOLVED: Bladder wall thickening ICD-10-CM: N32.89  ICD-9-CM: 596.89  2016 - 3/15/2017        RESOLVED: Hematuria ICD-10-CM: R31.9  ICD-9-CM: 599.70  2016 - 3/15/2017              Subjective:    48 y.o.  male who presented to the Emergency Department complaining of dyspnea. It has progressed over 3 days since a fall in which he injured left ribs. He was placed on PO percocet. DE LA TORRE worsened over days, with cough, inability to clear his thin white sputum, and severe pleuritic pain. ER workup shows severe hypoxia, low platelets, but no signs of PNA. We will admit him for management. (Dr Sundeep Narvaez)    3/16:  He still feels short of breath at rest.  Sats in mid 80s off his CPAP. Better than yesterday but still has some trouble with complete sentences. Still c/o rib pains. He reports any cough or deep breath is painful. D-dimer neg. CT chest pending. 3/17: CT shows rib fractures. Dopplers neg for DVT. Sats are improving. Still having pain. 3/18: Now on Morphine PCA and is more comfortable. Have not been able to draw labs as he is a very hard stick. Oxygen levels have improved. Slept fairly well last night. 3/19:  Using PCA frequently. Still c/o severe rib pain. Glucose a little high - check A1C.    3/20:  He reports he is feeling better with a little less pain. High flow has been weaned a little. HbA1C was 6.0 on 3/19/17. Review of Systems:   A comprehensive review of systems was negative except for that written in the HPI.     Objective:   Physical Exam:     Visit Vitals    /66    Pulse 65    Temp 98.1 °F (36.7 °C)    Resp 13    Ht 5' 7\" (1.702 m)    Wt 95.8 kg (211 lb 3.2 oz)    SpO2 95%    BMI 33.08 kg/m2    O2 Flow Rate (L/min): 45 l/min O2 Device: Hi flow nasal cannula    Temp (24hrs), Av.4 °F (36.9 °C), Min:98 °F (36.7 °C), Max:98.7 °F (37.1 °C)    701 - 1900  In: 50 [I.V.:50]  Out: -    1901 - 700  In: 2115.8 [P.O.:480; I.V.:1635.8]  Out: 8074 [Urine:4175]    General:  Alert, cooperative, no distress, appears stated age. Head:  Normocephalic, without obvious abnormality, atraumatic. Eyes:  Conjunctivae/corneas clear. PERRL, EOMs intact. Nose:  No drainage or sinus tenderness. Throat: Lips, mucosa, and tongue moist..   Neck: Supple, symmetrical, trachea midline, no adenopathy, thyroid: no enlargement/tenderness/nodules, no carotid bruit and no JVD. No accessory muscle use   Back:   Symmetric, no curvature. ROM normal. No CVA tenderness. Lungs:   rhonchi throughout, breathing less shallow   Chest wall:   tenderness left ant ax line from mid chest down. No deformity. Heart:  Regular rate and rhythm, S1, S2 normal, no murmur, click, rub or gallop. Abdomen:   Soft, non-tender. Bowel sounds normal. No masses,  No organomegaly. Extremities: no cyanosis or edema. No calf tenderness or cords. Pulses: 2+ and symmetric all extremities. Skin: Skin color, texture, turgor normal. No rashes or lesions   Neurologic: CNII-XII intact. Alert and oriented X 3. Fine motor of hands and fingers normal.   equal.  No cogwheeling or rigidity. Gait not tested at this time. Sensation grossly normal to touch. Gross motor of extremities normal.       Data Review:     Venous Duplex LE 3/15/17:  Bilateral lower extremity venous duplex negative for deep  venous thrombosis or thrombophlebitis    Results for Gerry Adkins (MRN [id number]) as of 3/16/2017 08:50   Ref.  Range 3/15/2017 10:53 3/15/2017 18:22   AMPHETAMINE Latest Ref Range: NEG    NEGATIVE   BARBITURATES Latest Ref Range: NEG    NEGATIVE   BENZODIAZEPINE Latest Ref Range: NEG    NEGATIVE   COCAINE Latest Ref Range: NEG    NEGATIVE   METHADONE Latest Ref Range: NEG    NEGATIVE   OPIATES Latest Ref Range: NEG    POSITIVE (A)   PCP(PHENCYCLIDINE) Latest Ref Range: NEG    NEGATIVE   THC (TH-CANNABINOL) Latest Ref Range: NEG    POSITIVE (A) ALCOHOL(ETHYL),SERUM Latest Ref Range: <10 MG/DL <10        Recent Days:  Recent Labs      03/20/17   0429  03/19/17 0432   WBC  10.9  9.2   HGB  12.3  13.4   HCT  37.6  38.2   PLT  187  156     Recent Labs      03/20/17   0429  03/19/17 0432   NA  136  136   K  4.8  4.6   CL  102  100   CO2  22  23   GLU  154*  156*   BUN  19  14   CREA  1.01  1.00   CA  9.8  9.5   MG  2.5*  2.2   PHOS  3.3  2.6   ALB  3.4*  3.6   TBILI  0.5  1.1*   SGOT  23  27   ALT  24  39     No results for input(s): PH, PCO2, PO2, HCO3, FIO2 in the last 72 hours. 24 Hour Results:  Recent Results (from the past 24 hour(s))   MAGNESIUM    Collection Time: 03/20/17  4:29 AM   Result Value Ref Range    Magnesium 2.5 (H) 1.6 - 2.4 mg/dL   PHOSPHORUS    Collection Time: 03/20/17  4:29 AM   Result Value Ref Range    Phosphorus 3.3 2.6 - 4.7 MG/DL   METABOLIC PANEL, COMPREHENSIVE    Collection Time: 03/20/17  4:29 AM   Result Value Ref Range    Sodium 136 136 - 145 mmol/L    Potassium 4.8 3.5 - 5.1 mmol/L    Chloride 102 97 - 108 mmol/L    CO2 22 21 - 32 mmol/L    Anion gap 12 5 - 15 mmol/L    Glucose 154 (H) 65 - 100 mg/dL    BUN 19 6 - 20 MG/DL    Creatinine 1.01 0.70 - 1.30 MG/DL    BUN/Creatinine ratio 19 12 - 20      GFR est AA >60 >60 ml/min/1.73m2    GFR est non-AA >60 >60 ml/min/1.73m2    Calcium 9.8 8.5 - 10.1 MG/DL    Bilirubin, total 0.5 0.2 - 1.0 MG/DL    ALT (SGPT) 24 12 - 78 U/L    AST (SGOT) 23 15 - 37 U/L    Alk.  phosphatase 52 45 - 117 U/L    Protein, total 7.7 6.4 - 8.2 g/dL    Albumin 3.4 (L) 3.5 - 5.0 g/dL    Globulin 4.3 (H) 2.0 - 4.0 g/dL    A-G Ratio 0.8 (L) 1.1 - 2.2     CBC WITH AUTOMATED DIFF    Collection Time: 03/20/17  4:29 AM   Result Value Ref Range    WBC 10.9 4.1 - 11.1 K/uL    RBC 3.73 (L) 4.10 - 5.70 M/uL    HGB 12.3 12.1 - 17.0 g/dL    HCT 37.6 36.6 - 50.3 %    .8 (H) 80.0 - 99.0 FL    MCH 33.0 26.0 - 34.0 PG    MCHC 32.7 30.0 - 36.5 g/dL    RDW 12.3 11.5 - 14.5 %    PLATELET 704 702 - 502 K/uL NEUTROPHILS 87 (H) 32 - 75 %    LYMPHOCYTES 7 (L) 12 - 49 %    MONOCYTES 6 5 - 13 %    EOSINOPHILS 0 0 - 7 %    BASOPHILS 0 0 - 1 %    ABS. NEUTROPHILS 9.5 (H) 1.8 - 8.0 K/UL    ABS. LYMPHOCYTES 0.8 0.8 - 3.5 K/UL    ABS. MONOCYTES 0.6 0.0 - 1.0 K/UL    ABS. EOSINOPHILS 0.0 0.0 - 0.4 K/UL    ABS.  BASOPHILS 0.0 0.0 - 0.1 K/UL   PRO-BNP    Collection Time: 03/20/17  4:29 AM   Result Value Ref Range    NT pro- 0 - 125 PG/ML       Medications reviewed  Current Facility-Administered Medications   Medication Dose Route Frequency    bumetanide (BUMEX) injection 0.5 mg  0.5 mg IntraVENous BID    nicotine (NICODERM CQ) 21 mg/24 hr patch 1 Patch  1 Patch TransDERmal DAILY    albuterol-ipratropium (DUO-NEB) 2.5 MG-0.5 MG/3 ML  3 mL Nebulization Q6H RT    methylPREDNISolone (PF) (SOLU-MEDROL) injection 40 mg  40 mg IntraVENous Q8H    ketorolac (TORADOL) injection 30 mg  30 mg IntraVENous Q6H PRN    acetylcysteine (MUCOMYST) 200 mg/mL (20 %) solution 400 mg  2 mL Nebulization BID    morphine injection 8 mg  8 mg IntraVENous Q4H PRN    albuterol (PROVENTIL VENTOLIN) nebulizer solution 2.5 mg  2.5 mg Nebulization Q6H PRN    senna-docusate (PERICOLACE) 8.6-50 mg per tablet 1 Tab  1 Tab Oral BID    morphine (PF)  mg/30 ml   IntraVENous CONTINUOUS    acetaminophen (TYLENOL) tablet 650 mg  650 mg Oral Q6H PRN    diphenhydrAMINE (BENADRYL) injection 25 mg  25 mg IntraVENous Q4H PRN    polyethylene glycol (MIRALAX) packet 17 g  17 g Oral DAILY    lidocaine (LIDODERM) 5 % patch 1 Patch  1 Patch TransDERmal Q24H    fenofibrate nanocrystallized (TRICOR) tablet 145 mg  145 mg Oral DAILY    pantoprazole (PROTONIX) tablet 40 mg  40 mg Oral EVERY OTHER DAY    rosuvastatin (CRESTOR) tablet 10 mg  10 mg Oral DAILY    naloxone (NARCAN) injection 0.4 mg  0.4 mg IntraVENous PRN    0.9% sodium chloride infusion  50 mL/hr IntraVENous CONTINUOUS    HYDROcodone-acetaminophen (NORCO) 5-325 mg per tablet 1 Tab  1 Tab Oral Q4H PRN    ondansetron (ZOFRAN) injection 4 mg  4 mg IntraVENous Q4H PRN    guaiFENesin ER (MUCINEX) tablet 600 mg  600 mg Oral Q12H       Care Plan discussed with: Patient/Family/nurse    Total time spent with patient and review of records: 30 minutes.     Yaw Wood MD

## 2017-03-20 NOTE — PROGRESS NOTES
Problem: Falls - Risk of  Goal: *Absence of falls  Outcome: Progressing Towards Goal  Pt AAO and able to ring out for assistance

## 2017-03-21 ENCOUNTER — APPOINTMENT (OUTPATIENT)
Dept: GENERAL RADIOLOGY | Age: 47
DRG: 189 | End: 2017-03-21
Attending: INTERNAL MEDICINE
Payer: COMMERCIAL

## 2017-03-21 LAB
ALBUMIN SERPL BCP-MCNC: 3.2 G/DL (ref 3.5–5)
ALBUMIN/GLOB SERPL: 0.7 {RATIO} (ref 1.1–2.2)
ALP SERPL-CCNC: 50 U/L (ref 45–117)
ALT SERPL-CCNC: 38 U/L (ref 12–78)
ANION GAP BLD CALC-SCNC: 10 MMOL/L (ref 5–15)
AST SERPL W P-5'-P-CCNC: 25 U/L (ref 15–37)
BASOPHILS # BLD AUTO: 0 K/UL (ref 0–0.1)
BASOPHILS # BLD: 0 % (ref 0–1)
BILIRUB SERPL-MCNC: 0.4 MG/DL (ref 0.2–1)
BUN SERPL-MCNC: 24 MG/DL (ref 6–20)
BUN/CREAT SERPL: 22 (ref 12–20)
CALCIUM SERPL-MCNC: 9.4 MG/DL (ref 8.5–10.1)
CHLORIDE SERPL-SCNC: 100 MMOL/L (ref 97–108)
CO2 SERPL-SCNC: 25 MMOL/L (ref 21–32)
CREAT SERPL-MCNC: 1.08 MG/DL (ref 0.7–1.3)
EOSINOPHIL # BLD: 0 K/UL (ref 0–0.4)
EOSINOPHIL NFR BLD: 0 % (ref 0–7)
ERYTHROCYTE [DISTWIDTH] IN BLOOD BY AUTOMATED COUNT: 12.3 % (ref 11.5–14.5)
GLOBULIN SER CALC-MCNC: 4.5 G/DL (ref 2–4)
GLUCOSE SERPL-MCNC: 145 MG/DL (ref 65–100)
HCT VFR BLD AUTO: 37 % (ref 36.6–50.3)
HGB BLD-MCNC: 12.7 G/DL (ref 12.1–17)
LYMPHOCYTES # BLD AUTO: 10 % (ref 12–49)
LYMPHOCYTES # BLD: 0.9 K/UL (ref 0.8–3.5)
MAGNESIUM SERPL-MCNC: 2.3 MG/DL (ref 1.6–2.4)
MCH RBC QN AUTO: 34 PG (ref 26–34)
MCHC RBC AUTO-ENTMCNC: 34.3 G/DL (ref 30–36.5)
MCV RBC AUTO: 99.2 FL (ref 80–99)
MONOCYTES # BLD: 0.5 K/UL (ref 0–1)
MONOCYTES NFR BLD AUTO: 6 % (ref 5–13)
NEUTS SEG # BLD: 7.5 K/UL (ref 1.8–8)
NEUTS SEG NFR BLD AUTO: 84 % (ref 32–75)
PHOSPHATE SERPL-MCNC: 4.3 MG/DL (ref 2.6–4.7)
PLATELET # BLD AUTO: 215 K/UL (ref 150–400)
POTASSIUM SERPL-SCNC: 4.7 MMOL/L (ref 3.5–5.1)
PROT SERPL-MCNC: 7.7 G/DL (ref 6.4–8.2)
RBC # BLD AUTO: 3.73 M/UL (ref 4.1–5.7)
SODIUM SERPL-SCNC: 135 MMOL/L (ref 136–145)
WBC # BLD AUTO: 8.9 K/UL (ref 4.1–11.1)

## 2017-03-21 PROCEDURE — 94640 AIRWAY INHALATION TREATMENT: CPT

## 2017-03-21 PROCEDURE — 84100 ASSAY OF PHOSPHORUS: CPT | Performed by: INTERNAL MEDICINE

## 2017-03-21 PROCEDURE — 36415 COLL VENOUS BLD VENIPUNCTURE: CPT | Performed by: INTERNAL MEDICINE

## 2017-03-21 PROCEDURE — 97116 GAIT TRAINING THERAPY: CPT

## 2017-03-21 PROCEDURE — 74011250636 HC RX REV CODE- 250/636: Performed by: INTERNAL MEDICINE

## 2017-03-21 PROCEDURE — 77030032490 HC SLV COMPR SCD KNE COVD -B

## 2017-03-21 PROCEDURE — 74011000250 HC RX REV CODE- 250: Performed by: INTERNAL MEDICINE

## 2017-03-21 PROCEDURE — 97530 THERAPEUTIC ACTIVITIES: CPT

## 2017-03-21 PROCEDURE — 74011250637 HC RX REV CODE- 250/637: Performed by: PHYSICIAN ASSISTANT

## 2017-03-21 PROCEDURE — 74011250637 HC RX REV CODE- 250/637: Performed by: INTERNAL MEDICINE

## 2017-03-21 PROCEDURE — 71010 XR CHEST PORT: CPT

## 2017-03-21 PROCEDURE — 65270000029 HC RM PRIVATE

## 2017-03-21 PROCEDURE — 77010033678 HC OXYGEN DAILY

## 2017-03-21 PROCEDURE — 83735 ASSAY OF MAGNESIUM: CPT | Performed by: INTERNAL MEDICINE

## 2017-03-21 PROCEDURE — 80053 COMPREHEN METABOLIC PANEL: CPT | Performed by: INTERNAL MEDICINE

## 2017-03-21 PROCEDURE — 74011250637 HC RX REV CODE- 250/637: Performed by: NURSE PRACTITIONER

## 2017-03-21 PROCEDURE — 74011250637 HC RX REV CODE- 250/637: Performed by: STUDENT IN AN ORGANIZED HEALTH CARE EDUCATION/TRAINING PROGRAM

## 2017-03-21 PROCEDURE — 85025 COMPLETE CBC W/AUTO DIFF WBC: CPT | Performed by: INTERNAL MEDICINE

## 2017-03-21 RX ORDER — DIPHENHYDRAMINE HCL 25 MG
25 CAPSULE ORAL
Status: DISCONTINUED | OUTPATIENT
Start: 2017-03-21 | End: 2017-03-21

## 2017-03-21 RX ADMIN — GUAIFENESIN 600 MG: 600 TABLET, EXTENDED RELEASE ORAL at 10:04

## 2017-03-21 RX ADMIN — ACETYLCYSTEINE 400 MG: 200 SOLUTION ORAL; RESPIRATORY (INHALATION) at 20:18

## 2017-03-21 RX ADMIN — SODIUM CHLORIDE 50 ML/HR: 900 INJECTION, SOLUTION INTRAVENOUS at 03:02

## 2017-03-21 RX ADMIN — IPRATROPIUM BROMIDE AND ALBUTEROL SULFATE 3 ML: .5; 3 SOLUTION RESPIRATORY (INHALATION) at 20:18

## 2017-03-21 RX ADMIN — FENOFIBRATE 145 MG: 145 TABLET ORAL at 10:04

## 2017-03-21 RX ADMIN — DOCUSATE SODIUM -SENNOSIDES 1 TABLET: 50; 8.6 TABLET, COATED ORAL at 10:04

## 2017-03-21 RX ADMIN — GUAIFENESIN 600 MG: 600 TABLET, EXTENDED RELEASE ORAL at 20:26

## 2017-03-21 RX ADMIN — ROSUVASTATIN CALCIUM 10 MG: 10 TABLET, FILM COATED ORAL at 10:04

## 2017-03-21 RX ADMIN — ACETYLCYSTEINE 400 MG: 200 SOLUTION ORAL; RESPIRATORY (INHALATION) at 07:09

## 2017-03-21 RX ADMIN — DIPHENHYDRAMINE HYDROCHLORIDE 25 MG: 50 INJECTION, SOLUTION INTRAMUSCULAR; INTRAVENOUS at 17:32

## 2017-03-21 RX ADMIN — IPRATROPIUM BROMIDE AND ALBUTEROL SULFATE 3 ML: .5; 3 SOLUTION RESPIRATORY (INHALATION) at 07:10

## 2017-03-21 RX ADMIN — POLYETHYLENE GLYCOL 3350 17 G: 17 POWDER, FOR SOLUTION ORAL at 10:05

## 2017-03-21 RX ADMIN — IPRATROPIUM BROMIDE AND ALBUTEROL SULFATE 3 ML: .5; 3 SOLUTION RESPIRATORY (INHALATION) at 01:11

## 2017-03-21 RX ADMIN — PANTOPRAZOLE SODIUM 40 MG: 40 TABLET, DELAYED RELEASE ORAL at 14:46

## 2017-03-21 RX ADMIN — METHYLPREDNISOLONE SODIUM SUCCINATE 40 MG: 40 INJECTION, POWDER, FOR SOLUTION INTRAMUSCULAR; INTRAVENOUS at 06:38

## 2017-03-21 RX ADMIN — SODIUM CHLORIDE 50 ML/HR: 900 INJECTION, SOLUTION INTRAVENOUS at 19:40

## 2017-03-21 RX ADMIN — IPRATROPIUM BROMIDE AND ALBUTEROL SULFATE 3 ML: .5; 3 SOLUTION RESPIRATORY (INHALATION) at 13:00

## 2017-03-21 RX ADMIN — DOCUSATE SODIUM -SENNOSIDES 1 TABLET: 50; 8.6 TABLET, COATED ORAL at 17:32

## 2017-03-21 NOTE — PROGRESS NOTES
9912 Bedside and Verbal shift change report given to [address] [place] [address] (oncoming nurse) by Cherie RN (offgoing nurse). Report included the following information SBAR, Kardex, Intake/Output, MAR and Recent Results. 1740 pt c/o itching of chest after telemetry leads removed. Pt is under medical status, so telemetry discontinued. Pt medicated with prn benadryl for c/o itching and for itching from morphine pca.     1945 Bedside and Verbal shift change report given to Nisa Mcdaniel (oncoming nurse) by Adelina Grigsby RN (offgoing nurse). Report included the following information SBAR, Kardex, Intake/Output, MAR and Recent Results.

## 2017-03-21 NOTE — PROGRESS NOTES
Spiritual Care Assessment/Progress Notes    Jesenia Arreguin [id number]  [id number]    1970  48 y.o.  male    Patient Telephone Number: [phone] (home)   Jainism Affiliation: Amish   Language: English   Extended Emergency Contact Information  Primary Emergency Contact: Karyle Robe  Address: [address] Phone: [phone]  Relation: Spouse   Patient Active Problem List    Diagnosis Date Noted    Acute chest wall pain 03/17/2017    Cough 03/17/2017    SOB (shortness of breath) 03/17/2017    Therapeutic opioid induced constipation 03/17/2017    Acute hypoxemic respiratory failure ([address] 03/15/2017    Rib fractures 03/15/2017    Sleep apnea     Hypercholesteremia     Hyperlipidemia 05/31/2016    GERD (gastroesophageal reflux disease) 05/31/2016        Date: 3/21/2017       Level of Jainism/Spiritual Activity:  [x]         Involved in ronni tradition/spiritual practice    []         Not involved in ronni tradition/spiritual practice  [x]         Spiritually oriented    []         Claims no spiritual orientation    []         seeking spiritual identity  []         Feels alienated from Temple practice/tradition  []         Feels angry about Temple practice/tradition  [x]         Spirituality/Temple tradition is a resource for coping at this time.   []         Not able to assess due to medical condition    Services Provided Today:  []         crisis intervention    []         reading Scriptures  [x]         spiritual assessment    [x]         prayer  [x]         empathic listening/emotional support  []         rites and rituals (cite in comments)  []         life review     []         Temple support  []         theological development   []         advocacy  []         ethical dialog     [x]         blessing  []         bereavement support    []         support to family  []         anticipatory grief support   [] help with AMD  []         spiritual guidance    []         meditation      Spiritual Care Needs  []         Emotional Support  [x]         Spiritual/Shinto Care  []         Loss/Adjustment  []         Advocacy/Referral                /Ethics  []         No needs expressed at               this time  []         Other: (note in               comments)  Spiritual Care Plan  []         Follow up visits with               pt/family  []         Provide materials  []         Schedule sacraments  []         Contact Community               Clergy  [x]         Follow up as needed  []         Other: (note in               comments)     Comments:  is attempting an initial spiritual consult with pt in room 322. Pt states the current moment is not the best time for a visit.  offered brief words of affirmation, encouragement and blessing. Spiritual care will continue to follow as able and as needed.      [id number] Nicholas Weiss M.Div, M.S, [address] available at [phone]

## 2017-03-21 NOTE — PROGRESS NOTES
3- CASE MANAGEMENT NOTE:  I met with the pt and his wife, Veronika Dean ([phone]), to introduce myself and explain the role of case management. He confirmed that he lives with his wife and their 15year old son in a one-story house. He is independent with his ADL's, uses no assistive devices and drives. He does not work outside the home but does take care of the house and takes their son to his activities. His PCP is Dr. Kayode Minor. He has prescription drug coverage and gets his medications from [facility]. They are not anticipating any discharge needs but I assured them CM will assist if needed.  Helen Panchal, BSW, CM

## 2017-03-21 NOTE — PROGRESS NOTES
Name: Darleen Chandra: [facility]   : 1970 Admit Date: 3/15/2017   Phone:   Room: 322/01   PCP: Haley Hawthorne MD  MRN: [id number]   Date: 3/21/2017  Code: Full Code          Chart and notes reviewed. Data reviewed. I review the patient's current medications in the medical record at each encounter. I have evaluated and examined the patient. Overnight events  Afebrile  Sats 93% on 5L  No overnight events  I/O: - 1973.3 ml    ROS: Pain control improved. Cough is mostly resolved. Still with DE LA TORRE, but is ambulating further. Denies abd pain. Denies CP. Denies LE pain/swelling.     Current Facility-Administered Medications   Medication Dose Route Frequency    methylPREDNISolone (PF) (SOLU-MEDROL) injection 30 mg  30 mg IntraVENous Q8H    bumetanide (BUMEX) injection 0.5 mg  0.5 mg IntraVENous BID    nicotine (NICODERM CQ) 21 mg/24 hr patch 1 Patch  1 Patch TransDERmal DAILY    albuterol-ipratropium (DUO-NEB) 2.5 MG-0.5 MG/3 ML  3 mL Nebulization Q6H RT    ketorolac (TORADOL) injection 30 mg  30 mg IntraVENous Q6H PRN    acetylcysteine (MUCOMYST) 200 mg/mL (20 %) solution 400 mg  2 mL Nebulization BID    morphine injection 8 mg  8 mg IntraVENous Q4H PRN    albuterol (PROVENTIL VENTOLIN) nebulizer solution 2.5 mg  2.5 mg Nebulization Q6H PRN    senna-docusate (PERICOLACE) 8.6-50 mg per tablet 1 Tab  1 Tab Oral BID    morphine (PF)  mg/30 ml   IntraVENous CONTINUOUS    acetaminophen (TYLENOL) tablet 650 mg  650 mg Oral Q6H PRN    diphenhydrAMINE (BENADRYL) injection 25 mg  25 mg IntraVENous Q4H PRN    polyethylene glycol (MIRALAX) packet 17 g  17 g Oral DAILY    lidocaine (LIDODERM) 5 % patch 1 Patch  1 Patch TransDERmal Q24H    fenofibrate nanocrystallized (TRICOR) tablet 145 mg  145 mg Oral DAILY    pantoprazole (PROTONIX) tablet 40 mg  40 mg Oral EVERY OTHER DAY    rosuvastatin (CRESTOR) tablet 10 mg  10 mg Oral DAILY    naloxone (NARCAN) injection 0.4 mg 0.4 mg IntraVENous PRN    0.9% sodium chloride infusion  50 mL/hr IntraVENous CONTINUOUS    HYDROcodone-acetaminophen (NORCO) 5-325 mg per tablet 1 Tab  1 Tab Oral Q4H PRN    ondansetron (ZOFRAN) injection 4 mg  4 mg IntraVENous Q4H PRN    guaiFENesin ER (MUCINEX) tablet 600 mg  600 mg Oral Q12H         REVIEW OF SYSTEMS   Negative except as stated in the HPI. Physical Exam:   Visit Vitals    /64 (BP 1 Location: Right arm, BP Patient Position: At rest)    Pulse 73    Temp 98.3 °F (36.8 °C)    Resp 17    Ht 5' 7\" (1.702 m)    Wt 95.8 kg (211 lb 3.2 oz)    SpO2 93%    BMI 33.08 kg/m2       General:  Alert, cooperative, no acute distress at rest, appears stated age. Head:  Normocephalic, without obvious abnormality, atraumatic. Eyes:  Conjunctivae/corneas clear. Nose: Nares normal. Septum midline. Mucosa normal.    Throat: Lips, mucosa, and tongue normal.    Neck: Supple, symmetrical, trachea midline, no adenopathy. Lungs:   CTAB. No wheeze or rales. Chest wall:  Tenderness to palpation laterally on the left. Heart:  Regular rate and rhythm, S1, S2 normal, no murmur, click, rub or gallop. Abdomen:   Soft, non-tender. Bowel sounds normal. No masses,  No organomegaly. Extremities: Extremities normal, atraumatic, no cyanosis or edema. Pulses: 2+ and symmetric all extremities.    Skin: Skin color, texture, turgor normal. No rashes or lesions   Lymph nodes: Cervical, supraclavicular nodes normal.   Neurologic: Grossly nonfocal       Lab Results   Component Value Date/Time    Sodium 135 03/21/2017 04:44 AM    Potassium 4.7 03/21/2017 04:44 AM    Chloride 100 03/21/2017 04:44 AM    CO2 25 03/21/2017 04:44 AM    BUN 24 03/21/2017 04:44 AM    Creatinine 1.08 03/21/2017 04:44 AM    Glucose 145 03/21/2017 04:44 AM    Calcium 9.4 03/21/2017 04:44 AM    Magnesium 2.3 03/21/2017 04:44 AM    Phosphorus 4.3 03/21/2017 04:44 AM    Lactic acid 1.0 03/15/2017 07:56 AM       Lab Results Component Value Date/Time    WBC 8.9 03/21/2017 04:44 AM    HGB 12.7 03/21/2017 04:44 AM    PLATELET 877 90/39/6145 04:44 AM    MCV 99.2 03/21/2017 04:44 AM       Lab Results   Component Value Date/Time    INR 1.0 06/16/2016 12:21 PM    AST (SGOT) 25 03/21/2017 04:44 AM    Alk. phosphatase 50 03/21/2017 04:44 AM    Protein, total 7.7 03/21/2017 04:44 AM    Albumin 3.2 03/21/2017 04:44 AM    Globulin 4.5 03/21/2017 04:44 AM       No results found for: IRON, FE, TIBC, IBCT, PSAT, FERR    No results found for: SR, CRP, KIMBERLY, ANAIGG, RA, RPR, RPRT, VDRLT, VDRLS, TSH, TSHEXT, TSHEXT     No results found for: PH, PHI, PCO2, PCO2I, PO2, PO2I, HCO3, HCO3I, FIO2, FIO2I    Lab Results   Component Value Date/Time    Troponin-I, Qt. <0.04 03/15/2017 10:53 AM        Lab Results   Component Value Date/Time    Culture result: MRSA NOT PRESENT 03/15/2017 11:47 AM    Culture result:  03/15/2017 11:47 AM         Screening of patient nares for MRSA is for surveillance purposes and, if positive, to facilitate isolation considerations in high risk settings. It is not intended for automatic decolonization interventions per se as regimens are not sufficiently effective to warrant routine use.     Culture result: NO GROWTH 5 DAYS 03/15/2017 07:56 AM       No results found for: TOXA1, RPR, HBCM, HBSAG, HAAB, HCAB, HCAB1, HAAT, G6PD, CRYAC, HIVGT, HIVR, HIV1, HIV12, HIVPC, HIVRPI    No results found for: VANCT, CPK    Lab Results   Component Value Date/Time    Color DARK YELLOW 03/15/2017 06:22 PM    Appearance CLEAR 03/15/2017 06:22 PM    Specific gravity >1.030 03/15/2017 06:22 PM    pH (UA) 5.5 03/15/2017 06:22 PM    Protein 30 03/15/2017 06:22 PM    Glucose NEGATIVE  03/15/2017 06:22 PM    Ketone NEGATIVE  03/15/2017 06:22 PM    Bilirubin NEGATIVE  05/31/2016 11:51 AM    Blood NEGATIVE  03/15/2017 06:22 PM    Urobilinogen 0.2 03/15/2017 06:22 PM    Nitrites NEGATIVE  03/15/2017 06:22 PM    Leukocyte Esterase NEGATIVE  03/15/2017 06:22 PM WBC 0-4 03/15/2017 06:22 PM    RBC 0-5 03/15/2017 06:22 PM    Bacteria 1+ 03/15/2017 06:22 PM       Images: personally visualized    Chest CT (3/21/17): Mild bibasilar atelectasis, the lungs are clear. Pulmonary vascular congestion has improved.      IMPRESSION  · Acute hypoxic respiratory failure  · Severe dyspnea: improved  · Abnormal chest CT: atx in the bases and potential developing pneumonia in the [place]  · Significant chest pain secondary to left rib fractures s/p fall (POA)  · Thrombocytopenia: resolved  · GERD  · DARYL  · Tobacco use    PLAN  · WeanO2 to keep sats > 90%  · CPAP nightly and with naps  · D/C steroids  · D/C Bumex  · Pain control per Palliative Care: assistance appreciated  · Encourage IS, OOB to chair, and ambulation as tolerated  · Encourage pulmonary toilet  · Mucinex  · Bowel regimen  · PT/OT  · Patient refusing DVT prophylaxis with Lovenox, but agrees to SCDs  · GI prophylaxis: Protonix  · Transfer to medical floor        [place], [place]

## 2017-03-21 NOTE — PROGRESS NOTES
Daily Progress Note: 3/21/2017  Maxwell Lopez MD    Assessment/Plan:   Acute hypoxemic respiratory failure - POA, unclear etiology. Splinting? Narcotic suppression? - improving slowly  ---CT shows rib fractures x2     Sleep apnea - Resumed CPAP     Rib fractures - POA, due to fall.   ---CT of chest without effusions    ---Pain control- now on morphine PCA  ---drug and alcohol screen positive for opiates and THC.  ---Added Toradol     Hyperlipidemia - Continue rosuvastatin and fenofibrate     GERD (gastroesophageal reflux disease) - Continue PPI     Thrombocytopenia (POA):  resolved.      Tobacco abuse - Provide patch. Advised cessation. Spent 5 minutes in addition to all other time spent on admission, noted below.      Problem List:  Problem List as of 3/21/2017  Date Reviewed: 3/15/2017          Codes Class Noted - Resolved    Acute chest wall pain ICD-10-CM: R07.89  ICD-9-CM: 786.52  3/17/2017 - Present        Cough ICD-10-CM: R05  ICD-9-CM: 786.2  3/17/2017 - Present        SOB (shortness of breath) ICD-10-CM: R06.02  ICD-9-CM: 786.05  3/17/2017 - Present        Therapeutic opioid induced constipation ICD-10-CM: K59.03, T40.2X5A  ICD-9-CM: 564.09, E935.2  3/17/2017 - Present        * (Principal)Acute hypoxemic respiratory failure (HCC) ICD-10-CM: J96.01  ICD-9-CM: 518.81  3/15/2017 - Present        Sleep apnea ICD-10-CM: G47.30  ICD-9-CM: 780.57  Unknown - Present        Hypercholesteremia ICD-10-CM: E78.00  ICD-9-CM: 272.0  Unknown - Present        Rib fractures ICD-10-CM: S22.39XA  ICD-9-CM: 807.00  3/15/2017 - Present        Hyperlipidemia ICD-10-CM: E78.5  ICD-9-CM: 272.4  5/31/2016 - Present        GERD (gastroesophageal reflux disease) ICD-10-CM: K21.9  ICD-9-CM: 530.81  5/31/2016 - Present        RESOLVED: Diverticulitis of intestine with abscess ICD-10-CM: K57.80  ICD-9-CM: 562.11, 569.5  6/16/2016 - 3/15/2017        RESOLVED: Diverticulitis ICD-10-CM: Q52.24  ICD-9-CM: 562.11 5/31/2016 - 3/15/2017        RESOLVED: Bladder wall thickening ICD-10-CM: N32.89  ICD-9-CM: 596.89  5/31/2016 - 3/15/2017        RESOLVED: Hematuria ICD-10-CM: R31.9  ICD-9-CM: 599.70  5/31/2016 - 3/15/2017              Subjective:    48 y.o.  male who presented to the Emergency Department complaining of dyspnea. It has progressed over 3 days since a fall in which he injured left ribs. He was placed on PO percocet. DE LA TORRE worsened over days, with cough, inability to clear his thin white sputum, and severe pleuritic pain. ER workup shows severe hypoxia, low platelets, but no signs of PNA. We will admit him for management. (Dr Beryl Vidal)    3/16:  He still feels short of breath at rest.  Sats in mid 80s off his CPAP. Better than yesterday but still has some trouble with complete sentences. Still c/o rib pains. He reports any cough or deep breath is painful. D-dimer neg. CT chest pending. 3/17: CT shows rib fractures. Dopplers neg for DVT. Sats are improving. Still having pain. 3/18: Now on Morphine PCA and is more comfortable. Have not been able to draw labs as he is a very hard stick. Oxygen levels have improved. Slept fairly well last night. 3/19:  Using PCA frequently. Still c/o severe rib pain. Glucose a little high - check A1C.    3/20:  He reports he is feeling better with a little less pain. High flow has been weaned a little. HbA1C was 6.0 on 3/19/17.    3/21:  Again sees improvement in pain. Weaned to 4 liters O2 now and sats in 90s. Still requiring PCA. Cont to wean O2. Review of Systems:   A comprehensive review of systems was negative except for that written in the HPI.     Objective:   Physical Exam:     Visit Vitals    /64 (BP 1 Location: Right arm, BP Patient Position: At rest)    Pulse 67    Temp 98.2 °F (36.8 °C)    Resp 16    Ht 5' 7\" (1.702 m)    Wt 95.8 kg (211 lb 3.2 oz)    SpO2 95%    BMI 33.08 kg/m2    O2 Flow Rate (L/min): 6 l/min O2 Device: Nasal cannula    Temp (24hrs), Av °F (36.7 °C), Min:97.7 °F (36.5 °C), Max:98.2 °F (36.8 °C)         1901 -  0700  In: 712.5 [I.V.:712.5]  Out: 5749 [Urine:3050]    General:  Alert, cooperative, no distress, appears stated age. Head:  Normocephalic, without obvious abnormality, atraumatic. Eyes:  Conjunctivae/corneas clear. PERRL, EOMs intact. Nose:  No drainage or sinus tenderness. Throat: Lips, mucosa, and tongue moist..   Neck: Supple, symmetrical, trachea midline, no adenopathy, thyroid: no enlargement/tenderness/nodules, no carotid bruit and no JVD. No accessory muscle use   Back:   Symmetric, no curvature. ROM normal. No CVA tenderness. Lungs:   rhonchi throughout, breathing less shallow   Chest wall:   tenderness left ant ax line from mid chest down. No deformity. Heart:  Regular rate and rhythm, S1, S2 normal, no murmur, click, rub or gallop. Abdomen:   Soft, non-tender. Bowel sounds normal. No masses,  No organomegaly. Extremities: no cyanosis or edema. No calf tenderness or cords. Pulses: 2+ and symmetric all extremities. Skin: Skin color, texture, turgor normal. No rashes or lesions   Neurologic: CNII-XII intact. Alert and oriented X 3. Fine motor of hands and fingers normal.   equal.  No cogwheeling or rigidity. Gait not tested at this time. Sensation grossly normal to touch. Gross motor of extremities normal.       Data Review:     Venous Duplex LE 3/15/17:  Bilateral lower extremity venous duplex negative for deep  venous thrombosis or thrombophlebitis    Results for Gerry Adkins (MRN [id number]) as of 3/16/2017 08:50   Ref.  Range 3/15/2017 10:53 3/15/2017 18:22   AMPHETAMINE Latest Ref Range: NEG    NEGATIVE   BARBITURATES Latest Ref Range: NEG    NEGATIVE   BENZODIAZEPINE Latest Ref Range: NEG    NEGATIVE   COCAINE Latest Ref Range: NEG    NEGATIVE   METHADONE Latest Ref Range: NEG    NEGATIVE   OPIATES Latest Ref Range: NEG    POSITIVE (A) PCP(PHENCYCLIDINE) Latest Ref Range: NEG    NEGATIVE   THC (TH-CANNABINOL) Latest Ref Range: NEG    POSITIVE (A)   ALCOHOL(ETHYL),SERUM Latest Ref Range: <10 MG/DL <10        Recent Days:  Recent Labs      03/21/17 0444 03/20/17 0429 03/19/17 0432   WBC  8.9  10.9  9.2   HGB  12.7  12.3  13.4   HCT  37.0  37.6  38.2   PLT  215  187  156     Recent Labs      03/21/17 0444 03/20/17 0429 03/19/17 0432   NA  135*  136  136   K  4.7  4.8  4.6   CL  100  102  100   CO2  25  22  23   GLU  145*  154*  156*   BUN  24*  19  14   CREA  1.08  1.01  1.00   CA  9.4  9.8  9.5   MG  2.3  2.5*  2.2   PHOS  4.3  3.3  2.6   ALB  3.2*  3.4*  3.6   TBILI  0.4  0.5  1.1*   SGOT  25  23  27   ALT  38  24  39     No results for input(s): PH, PCO2, PO2, HCO3, FIO2 in the last 72 hours. 24 Hour Results:  Recent Results (from the past 24 hour(s))   MAGNESIUM    Collection Time: 03/21/17  4:44 AM   Result Value Ref Range    Magnesium 2.3 1.6 - 2.4 mg/dL   PHOSPHORUS    Collection Time: 03/21/17  4:44 AM   Result Value Ref Range    Phosphorus 4.3 2.6 - 4.7 MG/DL   METABOLIC PANEL, COMPREHENSIVE    Collection Time: 03/21/17  4:44 AM   Result Value Ref Range    Sodium 135 (L) 136 - 145 mmol/L    Potassium 4.7 3.5 - 5.1 mmol/L    Chloride 100 97 - 108 mmol/L    CO2 25 21 - 32 mmol/L    Anion gap 10 5 - 15 mmol/L    Glucose 145 (H) 65 - 100 mg/dL    BUN 24 (H) 6 - 20 MG/DL    Creatinine 1.08 0.70 - 1.30 MG/DL    BUN/Creatinine ratio 22 (H) 12 - 20      GFR est AA >60 >60 ml/min/1.73m2    GFR est non-AA >60 >60 ml/min/1.73m2    Calcium 9.4 8.5 - 10.1 MG/DL    Bilirubin, total 0.4 0.2 - 1.0 MG/DL    ALT (SGPT) 38 12 - 78 U/L    AST (SGOT) 25 15 - 37 U/L    Alk.  phosphatase 50 45 - 117 U/L    Protein, total 7.7 6.4 - 8.2 g/dL    Albumin 3.2 (L) 3.5 - 5.0 g/dL    Globulin 4.5 (H) 2.0 - 4.0 g/dL    A-G Ratio 0.7 (L) 1.1 - 2.2     CBC WITH AUTOMATED DIFF    Collection Time: 03/21/17  4:44 AM   Result Value Ref Range    WBC 8.9 4.1 - 11.1 K/uL    RBC 3.73 (L) 4.10 - 5.70 M/uL    HGB 12.7 12.1 - 17.0 g/dL    HCT 37.0 36.6 - 50.3 %    MCV 99.2 (H) 80.0 - 99.0 FL    MCH 34.0 26.0 - 34.0 PG    MCHC 34.3 30.0 - 36.5 g/dL    RDW 12.3 11.5 - 14.5 %    PLATELET 856 120 - 280 K/uL    NEUTROPHILS 84 (H) 32 - 75 %    LYMPHOCYTES 10 (L) 12 - 49 %    MONOCYTES 6 5 - 13 %    EOSINOPHILS 0 0 - 7 %    BASOPHILS 0 0 - 1 %    ABS. NEUTROPHILS 7.5 1.8 - 8.0 K/UL    ABS. LYMPHOCYTES 0.9 0.8 - 3.5 K/UL    ABS. MONOCYTES 0.5 0.0 - 1.0 K/UL    ABS. EOSINOPHILS 0.0 0.0 - 0.4 K/UL    ABS.  BASOPHILS 0.0 0.0 - 0.1 K/UL       Medications reviewed  Current Facility-Administered Medications   Medication Dose Route Frequency    bumetanide (BUMEX) injection 0.5 mg  0.5 mg IntraVENous BID    nicotine (NICODERM CQ) 21 mg/24 hr patch 1 Patch  1 Patch TransDERmal DAILY    albuterol-ipratropium (DUO-NEB) 2.5 MG-0.5 MG/3 ML  3 mL Nebulization Q6H RT    methylPREDNISolone (PF) (SOLU-MEDROL) injection 40 mg  40 mg IntraVENous Q8H    ketorolac (TORADOL) injection 30 mg  30 mg IntraVENous Q6H PRN    acetylcysteine (MUCOMYST) 200 mg/mL (20 %) solution 400 mg  2 mL Nebulization BID    morphine injection 8 mg  8 mg IntraVENous Q4H PRN    albuterol (PROVENTIL VENTOLIN) nebulizer solution 2.5 mg  2.5 mg Nebulization Q6H PRN    senna-docusate (PERICOLACE) 8.6-50 mg per tablet 1 Tab  1 Tab Oral BID    morphine (PF)  mg/30 ml   IntraVENous CONTINUOUS    acetaminophen (TYLENOL) tablet 650 mg  650 mg Oral Q6H PRN    diphenhydrAMINE (BENADRYL) injection 25 mg  25 mg IntraVENous Q4H PRN    polyethylene glycol (MIRALAX) packet 17 g  17 g Oral DAILY    lidocaine (LIDODERM) 5 % patch 1 Patch  1 Patch TransDERmal Q24H    fenofibrate nanocrystallized (TRICOR) tablet 145 mg  145 mg Oral DAILY    pantoprazole (PROTONIX) tablet 40 mg  40 mg Oral EVERY OTHER DAY    rosuvastatin (CRESTOR) tablet 10 mg  10 mg Oral DAILY    naloxone (NARCAN) injection 0.4 mg  0.4 mg IntraVENous PRN    0.9% sodium chloride infusion  50 mL/hr IntraVENous CONTINUOUS    HYDROcodone-acetaminophen (NORCO) 5-325 mg per tablet 1 Tab  1 Tab Oral Q4H PRN    ondansetron (ZOFRAN) injection 4 mg  4 mg IntraVENous Q4H PRN    guaiFENesin ER (MUCINEX) tablet 600 mg  600 mg Oral Q12H       Care Plan discussed with: Patient/Family/nurse    Total time spent with patient and review of records: 30 minutes.     Philomena Stauffer MD

## 2017-03-21 NOTE — PROGRESS NOTES
physical Therapy TREATMENT/DISCHARGE  Patient: Isabel Miles (15 y.o. male)  Date: 3/21/2017  Diagnosis: Acute hypoxemic respiratory failure (HCC) Acute hypoxemic respiratory failure (HCC)       Precautions:      ASSESSMENT:  Based on the objective data described above, the patient presents with independent with ambulation without assistive device and independent with all functional mobility. Patient back to his base line level of function and now on 4 liter O2. Patient can ambulate with spouse on the hallway. Reviewed all safety precaution and home exercise program with the patient, verbalized understanding. Skilled physical therapy is not indicated at this time. Progression toward goals:  [x]      Improving appropriately and progressing toward goals  []      Improving slowly and progressing toward goals  []      Not making progress toward goals and plan of care will be adjusted     PLAN:  Patient will be discharged from physical therapy at this time. Rationale for discharge:  [x] Goals Achieved  [] Rhys Pryor  [] Patient not participating in therapy  [] Other:  Discharge Recommendations:  None  Further Equipment Recommendations for Discharge:  none     SUBJECTIVE:   Patient stated I feel better each day.     OBJECTIVE DATA SUMMARY:   Critical Behavior:  Neurologic State: Alert  Orientation Level: Oriented X4  Cognition: Appropriate decision making, Follows commands  Safety/Judgement: Awareness of environment  Functional Mobility Training:  Bed Mobility:  Rolling: Independent  Supine to Sit: Independent  Sit to Supine: Independent  Scooting: Independent        Transfers:  Sit to Stand: Independent  Stand to Sit: Independent  Stand Pivot Transfers: Independent     Bed to Chair: Independent                    Balance:  Sitting: Intact  Standing: Intact; Without support  Ambulation/Gait Training:  Distance (ft): 200 Feet (ft)     Ambulation - Level of Assistance: Independent     Gait Description (WDL): Within defined limits                            Therapeutic Exercises:    Instructed patient to continue active range of motion exercise on both legs while up on chair or on bed. Pain:  Pain Scale 1: Numeric (0 - 10)  Pain Intensity 1: 0  Pain Location 1: Back; Abdomen  Pain Orientation 1: Anterior  Pain Description 1: Aching  Pain Intervention(s) 1: Encouraged PCA  Activity Tolerance:   Good. Please refer to the flowsheet for vital signs taken during this treatment. After treatment:   [x] Patient left in no apparent distress sitting up in chair  [] Patient left in no apparent distress in bed  [x] Call bell left within reach  [x] Nursing notified  [x] Caregiver present  [] Bed alarm activated    COMMUNICATION/COLLABORATION:   The patients plan of care was discussed with: Registered Nurse and patient    Damion Martinez PT,WCC.    Time Calculation: 24 mins

## 2017-03-22 PROCEDURE — 74011250637 HC RX REV CODE- 250/637: Performed by: INTERNAL MEDICINE

## 2017-03-22 PROCEDURE — 77010033678 HC OXYGEN DAILY

## 2017-03-22 PROCEDURE — 74011250637 HC RX REV CODE- 250/637: Performed by: PHYSICIAN ASSISTANT

## 2017-03-22 PROCEDURE — 94640 AIRWAY INHALATION TREATMENT: CPT

## 2017-03-22 PROCEDURE — 74011000250 HC RX REV CODE- 250: Performed by: INTERNAL MEDICINE

## 2017-03-22 PROCEDURE — 74011250637 HC RX REV CODE- 250/637: Performed by: STUDENT IN AN ORGANIZED HEALTH CARE EDUCATION/TRAINING PROGRAM

## 2017-03-22 PROCEDURE — 77030012793 HC CIRC VNTLTR FISP -B

## 2017-03-22 PROCEDURE — 74011250636 HC RX REV CODE- 250/636: Performed by: INTERNAL MEDICINE

## 2017-03-22 PROCEDURE — 77030013140 HC MSK NEB VYRM -A

## 2017-03-22 PROCEDURE — 74011250637 HC RX REV CODE- 250/637: Performed by: NURSE PRACTITIONER

## 2017-03-22 PROCEDURE — 65270000029 HC RM PRIVATE

## 2017-03-22 RX ADMIN — IPRATROPIUM BROMIDE AND ALBUTEROL SULFATE 3 ML: .5; 3 SOLUTION RESPIRATORY (INHALATION) at 07:36

## 2017-03-22 RX ADMIN — DIPHENHYDRAMINE HYDROCHLORIDE 25 MG: 50 INJECTION, SOLUTION INTRAMUSCULAR; INTRAVENOUS at 04:00

## 2017-03-22 RX ADMIN — DOCUSATE SODIUM -SENNOSIDES 1 TABLET: 50; 8.6 TABLET, COATED ORAL at 18:34

## 2017-03-22 RX ADMIN — ROSUVASTATIN CALCIUM 10 MG: 10 TABLET, FILM COATED ORAL at 09:49

## 2017-03-22 RX ADMIN — DOCUSATE SODIUM -SENNOSIDES 1 TABLET: 50; 8.6 TABLET, COATED ORAL at 09:49

## 2017-03-22 RX ADMIN — IPRATROPIUM BROMIDE AND ALBUTEROL SULFATE 3 ML: .5; 3 SOLUTION RESPIRATORY (INHALATION) at 19:43

## 2017-03-22 RX ADMIN — ACETYLCYSTEINE 400 MG: 200 SOLUTION ORAL; RESPIRATORY (INHALATION) at 07:35

## 2017-03-22 RX ADMIN — DIPHENHYDRAMINE HYDROCHLORIDE: 50 INJECTION, SOLUTION INTRAMUSCULAR; INTRAVENOUS at 15:45

## 2017-03-22 RX ADMIN — HYDROCODONE BITARTRATE AND ACETAMINOPHEN 1 TABLET: 5; 325 TABLET ORAL at 15:45

## 2017-03-22 RX ADMIN — POLYETHYLENE GLYCOL 3350 17 G: 17 POWDER, FOR SOLUTION ORAL at 09:50

## 2017-03-22 RX ADMIN — GUAIFENESIN 600 MG: 600 TABLET, EXTENDED RELEASE ORAL at 09:49

## 2017-03-22 RX ADMIN — FENOFIBRATE 145 MG: 145 TABLET ORAL at 09:49

## 2017-03-22 RX ADMIN — IPRATROPIUM BROMIDE AND ALBUTEROL SULFATE 3 ML: .5; 3 SOLUTION RESPIRATORY (INHALATION) at 02:00

## 2017-03-22 RX ADMIN — HYDROCODONE BITARTRATE AND ACETAMINOPHEN 1 TABLET: 5; 325 TABLET ORAL at 20:31

## 2017-03-22 RX ADMIN — ACETYLCYSTEINE 400 MG: 200 SOLUTION ORAL; RESPIRATORY (INHALATION) at 19:43

## 2017-03-22 RX ADMIN — ALBUTEROL SULFATE 2.5 MG: 2.5 SOLUTION RESPIRATORY (INHALATION) at 13:40

## 2017-03-22 RX ADMIN — SODIUM CHLORIDE 50 ML/HR: 900 INJECTION, SOLUTION INTRAVENOUS at 15:45

## 2017-03-22 RX ADMIN — GUAIFENESIN 600 MG: 600 TABLET, EXTENDED RELEASE ORAL at 21:53

## 2017-03-22 NOTE — PROGRESS NOTES
Bedside and Verbal shift change report given to Jt Hdez (oncoming nurse) by Josepha Nageotte (offgoing nurse). Report included the following information SBAR, Kardex, MAR and Cardiac Rhythm NSR.     1030 - Patient states \"would like to come off of this\" referring to PCA pump. \"If they are going to put me on hydrocodone, I'll just go. \" \"I have that at home and it doesn't do anything for the pain. \"     [phone] - OK to discontinue PCA via telephone order and read back per Dr. Piedra Cancer. TRANSFER - OUT REPORT:    Verbal report given to Gabriela Mcdermott (name) on [facility]  being transferred to 5th floor (unit) for routine progression of care       Report consisted of patients Situation, Background, Assessment and   Recommendations(SBAR). Information from the following report(s) SBAR, Kardex, [facility] and Cardiac Rhythm NSR was reviewed with the receiving nurse. Lines:   Peripheral IV 03/94/70 Left Cephalic (Active)   Site Assessment Clean, dry, & intact 3/22/2017 10:43 AM   Phlebitis Assessment 0 3/22/2017 10:43 AM   Infiltration Assessment 0 3/22/2017 10:43 AM   Dressing Status Clean, dry, & intact 3/22/2017 10:43 AM   Dressing Type Transparent 3/22/2017 10:43 AM   Hub Color/Line Status Pink;Flushed 3/22/2017 10:43 AM   Action Taken Open ports on tubing capped 3/22/2017 10:43 AM   Alcohol Cap Used Yes 3/22/2017 10:43 AM        Opportunity for questions and clarification was provided.       Patient transported with:   O2 @ 3 liters  Registered Nurse  [facility]

## 2017-03-22 NOTE — ROUTINE PROCESS
Bedside and Verbal shift change report given to Heath Beebe (oncoming nurse) by Aubrey Rabago RN (offgoing nurse). Report included the following information SBAR, Kardex, Intake/Output, MAR, Accordion, Recent Results and Alarm Parameters .

## 2017-03-22 NOTE — PROGRESS NOTES
Name: Tara Shah: [address]   : 1970 Admit Date: 3/15/2017   Phone:   Room: 322/01   PCP: Ximena Tracey MD  MRN: [id number]   Date: 3/22/2017  Code: Full Code          Chart and notes reviewed. Data reviewed. I review the patient's current medications in the medical record at each encounter. I have evaluated and examined the patient. Overnight events  Afebrile  Sats 94% on 4L  No overnight events      ROS: Pain control improved for the most part. Still with break through pain with coughing. Doing well with PT and ambulates without significant pain. Denies abd pain. Denies CP. Denies LE pain/swelling.       Current Facility-Administered Medications   Medication Dose Route Frequency    nicotine (NICODERM CQ) 21 mg/24 hr patch 1 Patch  1 Patch TransDERmal DAILY    albuterol-ipratropium (DUO-NEB) 2.5 MG-0.5 MG/3 ML  3 mL Nebulization Q6H RT    acetylcysteine (MUCOMYST) 200 mg/mL (20 %) solution 400 mg  2 mL Nebulization BID    morphine injection 8 mg  8 mg IntraVENous Q4H PRN    albuterol (PROVENTIL VENTOLIN) nebulizer solution 2.5 mg  2.5 mg Nebulization Q6H PRN    senna-docusate (PERICOLACE) 8.6-50 mg per tablet 1 Tab  1 Tab Oral BID    morphine (PF)  mg/30 ml   IntraVENous CONTINUOUS    acetaminophen (TYLENOL) tablet 650 mg  650 mg Oral Q6H PRN    diphenhydrAMINE (BENADRYL) injection 25 mg  25 mg IntraVENous Q4H PRN    polyethylene glycol (MIRALAX) packet 17 g  17 g Oral DAILY    lidocaine (LIDODERM) 5 % patch 1 Patch  1 Patch TransDERmal Q24H    fenofibrate nanocrystallized (TRICOR) tablet 145 mg  145 mg Oral DAILY    pantoprazole (PROTONIX) tablet 40 mg  40 mg Oral EVERY OTHER DAY    rosuvastatin (CRESTOR) tablet 10 mg  10 mg Oral DAILY    naloxone (NARCAN) injection 0.4 mg  0.4 mg IntraVENous PRN    0.9% sodium chloride infusion  50 mL/hr IntraVENous CONTINUOUS    HYDROcodone-acetaminophen (NORCO) 5-325 mg per tablet 1 Tab  1 Tab Oral Q4H PRN    ondansetron (ZOFRAN) injection 4 mg  4 mg IntraVENous Q4H PRN    guaiFENesin ER (MUCINEX) tablet 600 mg  600 mg Oral Q12H         REVIEW OF SYSTEMS   Negative except as stated in the HPI. Physical Exam:   Visit Vitals    /71 (BP 1 Location: Right arm, BP Patient Position: At rest)    Pulse 63    Temp 98.6 °F (37 °C)    Resp 18    Ht 5' 7\" (1.702 m)    Wt 90.2 kg (198 lb 12.8 oz)    SpO2 94%    BMI 31.14 kg/m2       General:  Alert, cooperative, no acute distress at rest, appears stated age. Head:  Normocephalic, without obvious abnormality, atraumatic. Eyes:  Conjunctivae/corneas clear. Nose: Nares normal. Septum midline. Mucosa normal.    Throat: Lips, mucosa, and tongue normal.    Neck: Supple, symmetrical, trachea midline, no adenopathy. Lungs:   CTAB. No wheeze or rales. Chest wall:  Tenderness to palpation laterally and posteriorly on the left. Heart:  Regular rate and rhythm, S1, S2 normal, no murmur, click, rub or gallop. Abdomen:   Soft, non-tender. Bowel sounds normal. No masses,  No organomegaly. Extremities: Extremities normal, atraumatic, no cyanosis or edema. Pulses: 2+ and symmetric all extremities.    Skin: Skin color, texture, turgor normal. No rashes or lesions   Lymph nodes: Cervical, supraclavicular nodes normal.   Neurologic: Grossly nonfocal       Lab Results   Component Value Date/Time    Sodium 135 03/21/2017 04:44 AM    Potassium 4.7 03/21/2017 04:44 AM    Chloride 100 03/21/2017 04:44 AM    CO2 25 03/21/2017 04:44 AM    BUN 24 03/21/2017 04:44 AM    Creatinine 1.08 03/21/2017 04:44 AM    Glucose 145 03/21/2017 04:44 AM    Calcium 9.4 03/21/2017 04:44 AM    Magnesium 2.3 03/21/2017 04:44 AM    Phosphorus 4.3 03/21/2017 04:44 AM    Lactic acid 1.0 03/15/2017 07:56 AM       Lab Results   Component Value Date/Time    WBC 8.9 03/21/2017 04:44 AM    HGB 12.7 03/21/2017 04:44 AM    PLATELET 176 38/31/3348 04:44 AM    MCV 99.2 03/21/2017 04:44 AM Lab Results   Component Value Date/Time    INR 1.0 06/16/2016 12:21 PM    AST (SGOT) 25 03/21/2017 04:44 AM    Alk. phosphatase 50 03/21/2017 04:44 AM    Protein, total 7.7 03/21/2017 04:44 AM    Albumin 3.2 03/21/2017 04:44 AM    Globulin 4.5 03/21/2017 04:44 AM       No results found for: IRON, FE, TIBC, IBCT, PSAT, FERR    No results found for: SR, CRP, KIMBERLY, ANAIGG, RA, RPR, RPRT, VDRLT, VDRLS, TSH, TSHEXT, TSHEXT     No results found for: PH, PHI, PCO2, PCO2I, PO2, PO2I, HCO3, HCO3I, FIO2, FIO2I    Lab Results   Component Value Date/Time    Troponin-I, Qt. <0.04 03/15/2017 10:53 AM        Lab Results   Component Value Date/Time    Culture result: MRSA NOT PRESENT 03/15/2017 11:47 AM    Culture result:  03/15/2017 11:47 AM         Screening of patient nares for MRSA is for surveillance purposes and, if positive, to facilitate isolation considerations in high risk settings. It is not intended for automatic decolonization interventions per se as regimens are not sufficiently effective to warrant routine use.     Culture result: NO GROWTH 5 DAYS 03/15/2017 07:56 AM       No results found for: TOXA1, RPR, HBCM, HBSAG, HAAB, HCAB, HCAB1, HAAT, G6PD, CRYAC, HIVGT, HIVR, HIV1, HIV12, HIVPC, HIVRPI    No results found for: VANCT, CPK    Lab Results   Component Value Date/Time    Color DARK YELLOW 03/15/2017 06:22 PM    Appearance CLEAR 03/15/2017 06:22 PM    Specific gravity >1.030 03/15/2017 06:22 PM    pH (UA) 5.5 03/15/2017 06:22 PM    Protein 30 03/15/2017 06:22 PM    Glucose NEGATIVE  03/15/2017 06:22 PM    Ketone NEGATIVE  03/15/2017 06:22 PM    Bilirubin NEGATIVE  05/31/2016 11:51 AM    Blood NEGATIVE  03/15/2017 06:22 PM    Urobilinogen 0.2 03/15/2017 06:22 PM    Nitrites NEGATIVE  03/15/2017 06:22 PM    Leukocyte Esterase NEGATIVE  03/15/2017 06:22 PM    WBC 0-4 03/15/2017 06:22 PM    RBC 0-5 03/15/2017 06:22 PM    Bacteria 1+ 03/15/2017 06:22 PM       Images: no new images this morning    Chest CT (3/21/17): Mild bibasilar atelectasis, the lungs are clear. Pulmonary vascular congestion has improved. IMPRESSION  · Acute hypoxic respiratory failure  · Severe dyspnea: improved  · Abnormal chest CT: atx in the bases and potential developing pneumonia in the [place]  · Significant chest pain secondary to left rib fractures s/p fall (POA)  · Thrombocytopenia: resolved  · GERD  · DARYL  · Tobacco use    PLAN  · WeanO2 to keep sats > 90%; is likely to need home O2 for a short time at discharge  · CPAP nightly and with naps  · Pain control per Palliative Care: assistance appreciated  · Encourage IS, OOB to chair, and ambulation as tolerated  · Encourage pulmonary toilet  · Mucinex  · Bowel regimen  · PT/OT  · Patient refusing DVT prophylaxis with Lovenox, but agrees to SCDs  · GI prophylaxis: Protonix    Patient is stable from a pulmonary standpoint. We will sign off and be available as needed. Please call with questions.         Mario Alberto Delgadillo

## 2017-03-22 NOTE — PROGRESS NOTES
Daily Progress Note: 3/22/2017  James Enamorado MD    Assessment/Plan:   Acute hypoxemic respiratory failure - POA, unclear etiology. Splinting? Narcotic suppression? - improving slowly  ---CT shows rib fractures x2     Sleep apnea - Resumed CPAP     Rib fractures - POA, due to fall.   ---CT of chest without effusions    ---Pain control- now on morphine PCA  ---drug and alcohol screen positive for opiates and THC.  ---Added Toradol     Hyperlipidemia - Continue rosuvastatin and fenofibrate     GERD (gastroesophageal reflux disease) - Continue PPI     Thrombocytopenia (POA):  resolved.      Tobacco abuse - Provide patch. Advised cessation. Spent 5 minutes in addition to all other time spent on admission, noted below.      Problem List:  Problem List as of 3/22/2017  Date Reviewed: 3/15/2017          Codes Class Noted - Resolved    Acute chest wall pain ICD-10-CM: R07.89  ICD-9-CM: 786.52  3/17/2017 - Present        Cough ICD-10-CM: R05  ICD-9-CM: 786.2  3/17/2017 - Present        SOB (shortness of breath) ICD-10-CM: R06.02  ICD-9-CM: 786.05  3/17/2017 - Present        Therapeutic opioid induced constipation ICD-10-CM: K59.03, T40.2X5A  ICD-9-CM: 564.09, E935.2  3/17/2017 - Present        * (Principal)Acute hypoxemic respiratory failure (HCC) ICD-10-CM: J96.01  ICD-9-CM: 518.81  3/15/2017 - Present        Sleep apnea ICD-10-CM: G47.30  ICD-9-CM: 780.57  Unknown - Present        Hypercholesteremia ICD-10-CM: E78.00  ICD-9-CM: 272.0  Unknown - Present        Rib fractures ICD-10-CM: S22.39XA  ICD-9-CM: 807.00  3/15/2017 - Present        Hyperlipidemia ICD-10-CM: E78.5  ICD-9-CM: 272.4  5/31/2016 - Present        GERD (gastroesophageal reflux disease) ICD-10-CM: K21.9  ICD-9-CM: 530.81  5/31/2016 - Present        RESOLVED: Diverticulitis of intestine with abscess ICD-10-CM: K57.80  ICD-9-CM: 562.11, 569.5  6/16/2016 - 3/15/2017        RESOLVED: Diverticulitis ICD-10-CM: K61.97  ICD-9-CM: 562.11 5/31/2016 - 3/15/2017        RESOLVED: Bladder wall thickening ICD-10-CM: N32.89  ICD-9-CM: 596.89  5/31/2016 - 3/15/2017        RESOLVED: Hematuria ICD-10-CM: R31.9  ICD-9-CM: 599.70  5/31/2016 - 3/15/2017              Subjective:    48 y.o.  male who presented to the Emergency Department complaining of dyspnea. It has progressed over 3 days since a fall in which he injured left ribs. He was placed on PO percocet. [place] worsened over days, with cough, inability to clear his thin white sputum, and severe pleuritic pain. ER workup shows severe hypoxia, low platelets, but no signs of PNA. We will admit him for management. (Dr Murray Certain)    3/16:  He still feels short of breath at rest.  Sats in mid 80s off his CPAP. Better than yesterday but still has some trouble with complete sentences. Still c/o rib pains. He reports any cough or deep breath is painful. D-dimer neg. CT chest pending. 3/17: CT shows rib fractures. Dopplers neg for DVT. Sats are improving. Still having pain. 3/18: Now on Morphine PCA and is more comfortable. Have not been able to draw labs as he is a very hard stick. Oxygen levels have improved. Slept fairly well last night. 3/19:  Using PCA frequently. Still c/o severe rib pain. Glucose a little high - check A1C.    3/20:  He reports he is feeling better with a little less pain. High flow has been weaned a little. HbA1C was 6.0 on 3/19/17.    3/21:  Again sees improvement in pain. Weaned to 4 liters O2 now and sats in 90s. Still requiring PCA. Cont to wean O2.     3/22:  More pain and could not be dropped below 4 liters. Still requiring PCA pump. Will try to wean to 3 liters nasal O2 today. Review of Systems:   A comprehensive review of systems was negative except for that written in the HPI.     Objective:   Physical Exam:     Visit Vitals    /71 (BP 1 Location: Right arm, BP Patient Position: At rest)    Pulse 63    Temp 98.6 °F (37 °C)    Resp 18    Ht 5' 7\" (1.702 m)    Wt 90.2 kg (198 lb 12.8 oz)    SpO2 94%    BMI 31.14 kg/m2    O2 Flow Rate (L/min): 4 l/min O2 Device: Nasal cannula    Temp (24hrs), Av.6 °F (37 °C), Min:98.3 °F (36.8 °C), Max:98.9 °F (37.2 °C)    701 - 1900  In: -   Out: 300 [Urine:300]   1901 - 700  In: 3595.2 [P.O.:1620; I.V.:1975.2]  Out: 3550 [Urine:3550]    General:  Alert, cooperative, no distress, appears stated age. Head:  Normocephalic, without obvious abnormality, atraumatic. Eyes:  Conjunctivae/corneas clear. PERRL, EOMs intact. Nose:  No drainage or sinus tenderness. Throat: Lips, mucosa, and tongue moist..   Neck: Supple, symmetrical, trachea midline, no adenopathy, thyroid: no enlargement/tenderness/nodules, no carotid bruit and no JVD. No accessory muscle use   Back:   Symmetric, no curvature. ROM normal. No CVA tenderness. Lungs:   rhonchi throughout, breathing less shallow   Chest wall:   tenderness left ant ax line from mid chest down. No deformity. Heart:  Regular rate and rhythm, S1, S2 normal, no murmur, click, rub or gallop. Abdomen:   Soft, non-tender. Bowel sounds normal. No masses,  No organomegaly. Extremities: no cyanosis or edema. No calf tenderness or cords. Pulses: 2+ and symmetric all extremities. Skin: Skin color, texture, turgor normal. No rashes or lesions   Neurologic: CNII-XII intact. Alert and oriented X 3. Fine motor of hands and fingers normal.   equal.  No cogwheeling or rigidity. Gait not tested at this time. Sensation grossly normal to touch. Gross motor of extremities normal.       Data Review:     Venous Duplex LE 3/15/17:  Bilateral lower extremity venous duplex negative for deep  venous thrombosis or thrombophlebitis    Results for Alexandru Herrera (MRN [id number]) as of 3/16/2017 08:50   Ref.  Range 3/15/2017 10:53 3/15/2017 18:22   AMPHETAMINE Latest Ref Range: NEG    NEGATIVE   BARBITURATES Latest Ref Range: NEG    NEGATIVE BENZODIAZEPINE Latest Ref Range: NEG    NEGATIVE   COCAINE Latest Ref Range: NEG    NEGATIVE   METHADONE Latest Ref Range: NEG    NEGATIVE   OPIATES Latest Ref Range: NEG    POSITIVE (A)   PCP(PHENCYCLIDINE) Latest Ref Range: NEG    NEGATIVE   THC (TH-CANNABINOL) Latest Ref Range: NEG    POSITIVE (A)   ALCOHOL(ETHYL),SERUM Latest Ref Range: <10 MG/DL <10        Recent Days:  Recent Labs      03/21/17 0444  03/20/17 0429   WBC  8.9  10.9   HGB  12.7  12.3   HCT  37.0  37.6   PLT  215  187     Recent Labs      03/21/17 0444  03/20/17 0429   NA  135*  136   K  4.7  4.8   CL  100  102   CO2  25  22   GLU  145*  154*   BUN  24*  19   CREA  1.08  1.01   CA  9.4  9.8   MG  2.3  2.5*   PHOS  4.3  3.3   ALB  3.2*  3.4*   TBILI  0.4  0.5   SGOT  25  23   ALT  38  24     No results for input(s): PH, PCO2, PO2, HCO3, FIO2 in the last 72 hours. 24 Hour Results:  No results found for this or any previous visit (from the past 24 hour(s)).     Medications reviewed  Current Facility-Administered Medications   Medication Dose Route Frequency    nicotine (NICODERM CQ) 21 mg/24 hr patch 1 Patch  1 Patch TransDERmal DAILY    albuterol-ipratropium (DUO-NEB) 2.5 MG-0.5 MG/3 ML  3 mL Nebulization Q6H RT    ketorolac (TORADOL) injection 30 mg  30 mg IntraVENous Q6H PRN    acetylcysteine (MUCOMYST) 200 mg/mL (20 %) solution 400 mg  2 mL Nebulization BID    morphine injection 8 mg  8 mg IntraVENous Q4H PRN    albuterol (PROVENTIL VENTOLIN) nebulizer solution 2.5 mg  2.5 mg Nebulization Q6H PRN    senna-docusate (PERICOLACE) 8.6-50 mg per tablet 1 Tab  1 Tab Oral BID    morphine (PF)  mg/30 ml   IntraVENous CONTINUOUS    acetaminophen (TYLENOL) tablet 650 mg  650 mg Oral Q6H PRN    diphenhydrAMINE (BENADRYL) injection 25 mg  25 mg IntraVENous Q4H PRN    polyethylene glycol (MIRALAX) packet 17 g  17 g Oral DAILY    lidocaine (LIDODERM) 5 % patch 1 Patch  1 Patch TransDERmal Q24H    fenofibrate nanocrystallized (TRICOR) tablet 145 mg  145 mg Oral DAILY    pantoprazole (PROTONIX) tablet 40 mg  40 mg Oral EVERY OTHER DAY    rosuvastatin (CRESTOR) tablet 10 mg  10 mg Oral DAILY    naloxone (NARCAN) injection 0.4 mg  0.4 mg IntraVENous PRN    0.9% sodium chloride infusion  50 mL/hr IntraVENous CONTINUOUS    HYDROcodone-acetaminophen (NORCO) 5-325 mg per tablet 1 Tab  1 Tab Oral Q4H PRN    ondansetron (ZOFRAN) injection 4 mg  4 mg IntraVENous Q4H PRN    guaiFENesin ER (MUCINEX) tablet 600 mg  600 mg Oral Q12H       Care Plan discussed with: Patient/Family/nurse/pulmonary    Total time spent with patient and review of records: 30 minutes.     Aida Plascencia MD

## 2017-03-23 PROCEDURE — 74011000250 HC RX REV CODE- 250: Performed by: INTERNAL MEDICINE

## 2017-03-23 PROCEDURE — 74011250637 HC RX REV CODE- 250/637: Performed by: FAMILY MEDICINE

## 2017-03-23 PROCEDURE — 77010033678 HC OXYGEN DAILY

## 2017-03-23 PROCEDURE — 74011250637 HC RX REV CODE- 250/637: Performed by: INTERNAL MEDICINE

## 2017-03-23 PROCEDURE — 65270000029 HC RM PRIVATE

## 2017-03-23 PROCEDURE — 94640 AIRWAY INHALATION TREATMENT: CPT

## 2017-03-23 PROCEDURE — 74011250637 HC RX REV CODE- 250/637: Performed by: PHYSICIAN ASSISTANT

## 2017-03-23 PROCEDURE — 74011250636 HC RX REV CODE- 250/636: Performed by: INTERNAL MEDICINE

## 2017-03-23 PROCEDURE — 74011250636 HC RX REV CODE- 250/636: Performed by: FAMILY MEDICINE

## 2017-03-23 PROCEDURE — 74011250637 HC RX REV CODE- 250/637: Performed by: STUDENT IN AN ORGANIZED HEALTH CARE EDUCATION/TRAINING PROGRAM

## 2017-03-23 RX ORDER — TRAMADOL HYDROCHLORIDE 50 MG/1
50 TABLET ORAL
Status: DISCONTINUED | OUTPATIENT
Start: 2017-03-23 | End: 2017-03-24 | Stop reason: HOSPADM

## 2017-03-23 RX ORDER — KETOROLAC TROMETHAMINE 30 MG/ML
15 INJECTION, SOLUTION INTRAMUSCULAR; INTRAVENOUS
Status: DISCONTINUED | OUTPATIENT
Start: 2017-03-23 | End: 2017-03-24 | Stop reason: HOSPADM

## 2017-03-23 RX ORDER — IBUPROFEN 400 MG/1
400 TABLET ORAL 3 TIMES DAILY
Status: DISCONTINUED | OUTPATIENT
Start: 2017-03-23 | End: 2017-03-24 | Stop reason: HOSPADM

## 2017-03-23 RX ORDER — MELATONIN
1000 DAILY
Status: DISCONTINUED | OUTPATIENT
Start: 2017-03-23 | End: 2017-03-24 | Stop reason: HOSPADM

## 2017-03-23 RX ADMIN — PANTOPRAZOLE SODIUM 40 MG: 40 TABLET, DELAYED RELEASE ORAL at 11:58

## 2017-03-23 RX ADMIN — HYDROCODONE BITARTRATE AND ACETAMINOPHEN 1 TABLET: 5; 325 TABLET ORAL at 05:29

## 2017-03-23 RX ADMIN — DIPHENHYDRAMINE HYDROCHLORIDE 25 MG: 50 INJECTION, SOLUTION INTRAMUSCULAR; INTRAVENOUS at 05:29

## 2017-03-23 RX ADMIN — ROSUVASTATIN CALCIUM 10 MG: 10 TABLET, FILM COATED ORAL at 09:00

## 2017-03-23 RX ADMIN — GUAIFENESIN 600 MG: 600 TABLET, EXTENDED RELEASE ORAL at 09:00

## 2017-03-23 RX ADMIN — IPRATROPIUM BROMIDE AND ALBUTEROL SULFATE 3 ML: .5; 3 SOLUTION RESPIRATORY (INHALATION) at 01:14

## 2017-03-23 RX ADMIN — ACETYLCYSTEINE 400 MG: 200 SOLUTION ORAL; RESPIRATORY (INHALATION) at 20:00

## 2017-03-23 RX ADMIN — IPRATROPIUM BROMIDE AND ALBUTEROL SULFATE 3 ML: .5; 3 SOLUTION RESPIRATORY (INHALATION) at 09:17

## 2017-03-23 RX ADMIN — KETOROLAC TROMETHAMINE 15 MG: 30 INJECTION, SOLUTION INTRAMUSCULAR at 15:41

## 2017-03-23 RX ADMIN — DIPHENHYDRAMINE HYDROCHLORIDE 25 MG: 50 INJECTION, SOLUTION INTRAMUSCULAR; INTRAVENOUS at 00:29

## 2017-03-23 RX ADMIN — IBUPROFEN 400 MG: 400 TABLET ORAL at 09:00

## 2017-03-23 RX ADMIN — ACETYLCYSTEINE 400 MG: 200 SOLUTION ORAL; RESPIRATORY (INHALATION) at 08:00

## 2017-03-23 RX ADMIN — GUAIFENESIN 600 MG: 600 TABLET, EXTENDED RELEASE ORAL at 22:04

## 2017-03-23 RX ADMIN — KETOROLAC TROMETHAMINE 15 MG: 30 INJECTION, SOLUTION INTRAMUSCULAR at 07:06

## 2017-03-23 RX ADMIN — HYDROCODONE BITARTRATE AND ACETAMINOPHEN 1 TABLET: 5; 325 TABLET ORAL at 11:58

## 2017-03-23 RX ADMIN — KETOROLAC TROMETHAMINE 15 MG: 30 INJECTION, SOLUTION INTRAMUSCULAR at 23:11

## 2017-03-23 RX ADMIN — DIPHENHYDRAMINE HYDROCHLORIDE 25 MG: 50 INJECTION, SOLUTION INTRAMUSCULAR; INTRAVENOUS at 23:14

## 2017-03-23 RX ADMIN — IBUPROFEN 400 MG: 400 TABLET ORAL at 22:04

## 2017-03-23 RX ADMIN — IPRATROPIUM BROMIDE AND ALBUTEROL SULFATE 3 ML: .5; 3 SOLUTION RESPIRATORY (INHALATION) at 20:00

## 2017-03-23 RX ADMIN — HYDROCODONE BITARTRATE AND ACETAMINOPHEN 1 TABLET: 5; 325 TABLET ORAL at 00:29

## 2017-03-23 RX ADMIN — FENOFIBRATE 145 MG: 145 TABLET ORAL at 09:00

## 2017-03-23 RX ADMIN — IPRATROPIUM BROMIDE AND ALBUTEROL SULFATE 3 ML: .5; 3 SOLUTION RESPIRATORY (INHALATION) at 13:53

## 2017-03-23 RX ADMIN — IBUPROFEN 400 MG: 400 TABLET ORAL at 15:41

## 2017-03-23 RX ADMIN — VITAMIN D, TAB 1000IU (100/BT) 1000 UNITS: 25 TAB at 09:00

## 2017-03-23 NOTE — PROGRESS NOTES
Bedside and Verbal shift change report given to Raul Dominique RN (oncoming nurse) by Arturo Marsh RN (offgoing nurse). Report included the following information SBAR, Kardex, Intake/Output, MAR, Recent Results and Med Rec Status.

## 2017-03-23 NOTE — PROGRESS NOTES
Bedside and Verbal shift change report given to Rk Gomez RN (oncoming nurse) by Flor Myers RN (offgoing nurse). Report included the following information SBAR, Kardex, Intake/Output, MAR, Accordion and Recent Results.

## 2017-03-23 NOTE — PROGRESS NOTES
Bedside and Verbal shift change report given to LESA Otto (oncoming nurse) by Laura Garcia RN (offgoing nurse). Report included the following information SBAR, Kardex, Intake/Output, MAR, Accordion and Recent Results.

## 2017-03-23 NOTE — PROGRESS NOTES
Interdisciplinary team rounds were held 3/23/2017 with the following team members:Care Management, Nursing and Physical Therapy and the primary RN. Plan of care discussed. See clinical pathway and/or care plan for interventions and desired outcomes.     Discharge tomorrow

## 2017-03-23 NOTE — PROGRESS NOTES
Daily Progress Note: 3/23/2017  Maxwell Lopez MD    Assessment/Plan:   Acute hypoxemic respiratory failure - POA, unclear etiology. Splinting? Narcotic suppression? - improving slowly  ---CT shows rib fractures x2     Sleep apnea - Resumed CPAP     Rib fractures - POA, due to fall.   ---CT of chest without effusions    ---Pain control- now on morphine PCA  ---drug and alcohol screen positive for opiates and THC.  ---Added Toradol     Hyperlipidemia - Continue rosuvastatin and fenofibrate     GERD (gastroesophageal reflux disease) - Continue PPI     Thrombocytopenia (POA):  resolved.      Tobacco abuse - Provide patch. Advised cessation. Spent 5 minutes in addition to all other time spent on admission, noted below.      Problem List:  Problem List as of 3/23/2017  Date Reviewed: 3/15/2017          Codes Class Noted - Resolved    Acute chest wall pain ICD-10-CM: R07.89  ICD-9-CM: 786.52  3/17/2017 - Present        Cough ICD-10-CM: R05  ICD-9-CM: 786.2  3/17/2017 - Present        SOB (shortness of breath) ICD-10-CM: R06.02  ICD-9-CM: 786.05  3/17/2017 - Present        Therapeutic opioid induced constipation ICD-10-CM: K59.03, T40.2X5A  ICD-9-CM: 564.09, E935.2  3/17/2017 - Present        * (Principal)Acute hypoxemic respiratory failure (HCC) ICD-10-CM: J96.01  ICD-9-CM: 518.81  3/15/2017 - Present        Sleep apnea ICD-10-CM: G47.30  ICD-9-CM: 780.57  Unknown - Present        Hypercholesteremia ICD-10-CM: E78.00  ICD-9-CM: 272.0  Unknown - Present        Rib fractures ICD-10-CM: S22.39XA  ICD-9-CM: 807.00  3/15/2017 - Present        Hyperlipidemia ICD-10-CM: E78.5  ICD-9-CM: 272.4  5/31/2016 - Present        GERD (gastroesophageal reflux disease) ICD-10-CM: K21.9  ICD-9-CM: 530.81  5/31/2016 - Present        RESOLVED: Diverticulitis of intestine with abscess ICD-10-CM: K57.80  ICD-9-CM: 562.11, 569.5  6/16/2016 - 3/15/2017        RESOLVED: Diverticulitis ICD-10-CM: X16.59  ICD-9-CM: 562.11 5/31/2016 - 3/15/2017        RESOLVED: Bladder wall thickening ICD-10-CM: N32.89  ICD-9-CM: 596.89  5/31/2016 - 3/15/2017        RESOLVED: Hematuria ICD-10-CM: R31.9  ICD-9-CM: 599.70  5/31/2016 - 3/15/2017              Subjective:    48 y.o.  male who presented to the Emergency Department complaining of dyspnea. It has progressed over 3 days since a fall in which he injured left ribs. He was placed on PO percocet. DE LA TORRE worsened over days, with cough, inability to clear his thin white sputum, and severe pleuritic pain. ER workup shows severe hypoxia, low platelets, but no signs of PNA. We will admit him for management. (Dr Jeanine Gonzalez)    3/16:  He still feels short of breath at rest.  Sats in mid 80s off his CPAP. Better than yesterday but still has some trouble with complete sentences. Still c/o rib pains. He reports any cough or deep breath is painful. D-dimer neg. CT chest pending. 3/17: CT shows rib fractures. Dopplers neg for DVT. Sats are improving. Still having pain. 3/18: Now on Morphine PCA and is more comfortable. Have not been able to draw labs as he is a very hard stick. Oxygen levels have improved. Slept fairly well last night. 3/19:  Using PCA frequently. Still c/o severe rib pain. Glucose a little high - check A1C.    3/20:  He reports he is feeling better with a little less pain. High flow has been weaned a little. HbA1C was 6.0 on 3/19/17.    3/21:  Again sees improvement in pain. Weaned to 4 liters O2 now and sats in 90s. Still requiring PCA. Cont to wean O2.     3/22:  More pain and could not be dropped below 4 liters. Still requiring PCA pump. Will try to wean to 3 liters nasal O2 today. 3/23:  PCA pump DCed yesterday and still having a great deal of posterior rib pain - walked in nails yesterday and sats stayed in 90s on 3 liters. He feels that his \"lungs are better. \"Reports Norco does not help pain and causes itching and  Dilaudid makes him itch also - will try Tramadol and see if Palliative has another answer. He thinks he has had Tramadol in the past and did not have itching with it. Will also add NSAIDs. Review of Systems:   A comprehensive review of systems was negative except for that written in the HPI. Objective:   Physical Exam:     Visit Vitals    BP (P) 119/82 (BP 1 Location: Right arm, BP Patient Position: At rest)    Pulse (P) 80    Temp (P) 98.9 °F (37.2 °C)    Resp (P) 16    Ht 5' 7\" (1.702 m)    Wt 90.2 kg (198 lb 12.8 oz)    SpO2 (P) 96%    BMI 31.14 kg/m2    O2 Flow Rate (L/min): (P) 3 l/min O2 Device: (P) CPAP mask    Temp (24hrs), Av.5 °F (36.9 °C), Min:98 °F (36.7 °C), Max:98.9 °F (37.2 °C)         0701 -  1900  In: 2895.2 [P.O.:1620; I.V.:1275.2]  Out: 3125 [Urine:3125]    General:  Alert, cooperative, no distress, appears stated age. Head:  Normocephalic, without obvious abnormality, atraumatic. Eyes:  Conjunctivae/corneas clear. PERRL, EOMs intact. Nose:  No drainage or sinus tenderness. Throat: Lips, mucosa, and tongue moist..   Neck: Supple, symmetrical, trachea midline, no adenopathy, thyroid: no enlargement/tenderness/nodules, no carotid bruit and no JVD. No accessory muscle use   Back:   Symmetric, no curvature. ROM normal. No CVA tenderness. Lungs:   rhonchi fewer, breathing less shallow   Chest wall:   tenderness left ant ax line from mid chest down. No deformity. Heart:  Regular rate and rhythm, S1, S2 normal, no murmur, click, rub or gallop. Abdomen:   Soft, non-tender. Bowel sounds normal. No masses,  No organomegaly. Extremities: no cyanosis or edema. No calf tenderness or cords. Pulses: 2+ and symmetric all extremities. Skin: Skin color, texture, turgor normal. No rashes or lesions   Neurologic: CNII-XII intact. Alert and oriented X 3. Fine motor of hands and fingers normal.   equal.  No cogwheeling or rigidity. Gait not tested at this time. Sensation grossly normal to touch. Gross motor of extremities normal.       Data Review:     Venous Duplex LE 3/15/17:  Bilateral lower extremity venous duplex negative for deep  venous thrombosis or thrombophlebitis    Results for Scooter Danielle (MRN [id number]) as of 3/16/2017 08:50   Ref. Range 3/15/2017 10:53 3/15/2017 18:22   AMPHETAMINE Latest Ref Range: NEG    NEGATIVE   BARBITURATES Latest Ref Range: NEG    NEGATIVE   BENZODIAZEPINE Latest Ref Range: NEG    NEGATIVE   COCAINE Latest Ref Range: NEG    NEGATIVE   METHADONE Latest Ref Range: NEG    NEGATIVE   OPIATES Latest Ref Range: NEG    POSITIVE (A)   PCP(PHENCYCLIDINE) Latest Ref Range: NEG    NEGATIVE   THC (TH-CANNABINOL) Latest Ref Range: NEG    POSITIVE (A)   ALCOHOL(ETHYL),SERUM Latest Ref Range: <10 MG/DL <10        Recent Days:  Recent Labs      03/21/17   0444   WBC  8.9   HGB  12.7   HCT  37.0   PLT  215     Recent Labs      03/21/17   0444   NA  135*   K  4.7   CL  100   CO2  25   GLU  145*   BUN  24*   CREA  1.08   CA  9.4   MG  2.3   PHOS  4.3   ALB  3.2*   TBILI  0.4   SGOT  25   ALT  38     No results for input(s): PH, PCO2, PO2, HCO3, FIO2 in the last 72 hours. 24 Hour Results:  No results found for this or any previous visit (from the past 24 hour(s)).     Medications reviewed  Current Facility-Administered Medications   Medication Dose Route Frequency    nicotine (NICODERM CQ) 21 mg/24 hr patch 1 Patch  1 Patch TransDERmal DAILY    albuterol-ipratropium (DUO-NEB) 2.5 MG-0.5 MG/3 ML  3 mL Nebulization Q6H RT    acetylcysteine (MUCOMYST) 200 mg/mL (20 %) solution 400 mg  2 mL Nebulization BID    morphine injection 8 mg  8 mg IntraVENous Q4H PRN    albuterol (PROVENTIL VENTOLIN) nebulizer solution 2.5 mg  2.5 mg Nebulization Q6H PRN    senna-docusate (PERICOLACE) 8.6-50 mg per tablet 1 Tab  1 Tab Oral BID    acetaminophen (TYLENOL) tablet 650 mg  650 mg Oral Q6H PRN    diphenhydrAMINE (BENADRYL) injection 25 mg  25 mg IntraVENous Q4H PRN    polyethylene glycol (MIRALAX) packet 17 g  17 g Oral DAILY    lidocaine (LIDODERM) 5 % patch 1 Patch  1 Patch TransDERmal Q24H    fenofibrate nanocrystallized (TRICOR) tablet 145 mg  145 mg Oral DAILY    pantoprazole (PROTONIX) tablet 40 mg  40 mg Oral EVERY OTHER DAY    rosuvastatin (CRESTOR) tablet 10 mg  10 mg Oral DAILY    naloxone (NARCAN) injection 0.4 mg  0.4 mg IntraVENous PRN    0.9% sodium chloride infusion  50 mL/hr IntraVENous CONTINUOUS    HYDROcodone-acetaminophen (NORCO) 5-325 mg per tablet 1 Tab  1 Tab Oral Q4H PRN    ondansetron (ZOFRAN) injection 4 mg  4 mg IntraVENous Q4H PRN    guaiFENesin ER (MUCINEX) tablet 600 mg  600 mg Oral Q12H       Care Plan discussed with: Patient/Family/nurse/pulmonary    Total time spent with patient and review of records: 30 minutes.     Víctor Watts MD

## 2017-03-24 VITALS
BODY MASS INDEX: 31.2 KG/M2 | WEIGHT: 198.8 LBS | DIASTOLIC BLOOD PRESSURE: 81 MMHG | HEART RATE: 75 BPM | RESPIRATION RATE: 18 BRPM | HEIGHT: 67 IN | SYSTOLIC BLOOD PRESSURE: 123 MMHG | TEMPERATURE: 97.9 F | OXYGEN SATURATION: 95 %

## 2017-03-24 PROCEDURE — 94640 AIRWAY INHALATION TREATMENT: CPT

## 2017-03-24 PROCEDURE — 74011000250 HC RX REV CODE- 250: Performed by: INTERNAL MEDICINE

## 2017-03-24 PROCEDURE — 74011250636 HC RX REV CODE- 250/636: Performed by: FAMILY MEDICINE

## 2017-03-24 PROCEDURE — 77010033678 HC OXYGEN DAILY

## 2017-03-24 RX ORDER — IBUPROFEN 800 MG/1
800 TABLET ORAL 3 TIMES DAILY
Qty: 90 TAB | Refills: 0 | Status: ON HOLD | OUTPATIENT
Start: 2017-03-24 | End: 2018-10-05

## 2017-03-24 RX ORDER — LIDOCAINE 50 MG/G
PATCH TOPICAL
Qty: 30 EACH | Refills: 0 | Status: ON HOLD | OUTPATIENT
Start: 2017-03-24 | End: 2018-10-05

## 2017-03-24 RX ADMIN — KETOROLAC TROMETHAMINE 15 MG: 30 INJECTION, SOLUTION INTRAMUSCULAR at 06:26

## 2017-03-24 RX ADMIN — IPRATROPIUM BROMIDE AND ALBUTEROL SULFATE 3 ML: .5; 3 SOLUTION RESPIRATORY (INHALATION) at 01:46

## 2017-03-24 NOTE — PROGRESS NOTES
Bedside and Verbal shift change report given to Ksenia Lombardo RN (oncoming nurse) by Paige Alcantara RN (offgoing nurse). Report included the following information SBAR, Kardex, Intake/Output, MAR, Recent Results and Med Rec Status.

## 2017-03-24 NOTE — DISCHARGE INSTRUCTIONS
Patient Discharge Instructions    Medhat Rivera / [id number] : 1970    Admitted 3/15/2017 Discharged: 3/24/2017 7:02 AM     ACUTE DIAGNOSES:  Acute hypoxemic respiratory failure ([address]    CHRONIC MEDICAL DIAGNOSES:  Problem List as of 3/24/2017  Date Reviewed: 3/15/2017          Codes Class Noted - Resolved    Acute chest wall pain ICD-10-CM: R07.89  ICD-9-CM: 786.52  3/17/2017 - Present        Cough ICD-10-CM: R05  ICD-9-CM: 786.2  3/17/2017 - Present        SOB (shortness of breath) ICD-10-CM: R06.02  ICD-9-CM: 786.05  3/17/2017 - Present        Therapeutic opioid induced constipation ICD-10-CM: K59.03, T40.2X5A  ICD-9-CM: 564.09, E935.2  3/17/2017 - Present        * (Principal)Acute hypoxemic respiratory failure (HCC) ICD-10-CM: J96.01  ICD-9-CM: 518.81  3/15/2017 - Present        Sleep apnea ICD-10-CM: G47.30  ICD-9-CM: 780.57  Unknown - Present        Hypercholesteremia ICD-10-CM: E78.00  ICD-9-CM: 272.0  Unknown - Present        Rib fractures ICD-10-CM: S22.39XA  ICD-9-CM: 807.00  3/15/2017 - Present        Hyperlipidemia ICD-10-CM: E78.5  ICD-9-CM: 272.4  2016 - Present        GERD (gastroesophageal reflux disease) ICD-10-CM: K21.9  ICD-9-CM: 530.81  2016 - Present        RESOLVED: Diverticulitis of intestine with abscess ICD-10-CM: K57.80  ICD-9-CM: 562.11, 569.5  2016 - 3/15/2017        RESOLVED: Diverticulitis ICD-10-CM: Y39.89  ICD-9-CM: 562.11  2016 - 3/15/2017        RESOLVED: Bladder wall thickening ICD-10-CM: N32.89  ICD-9-CM: 596.89  2016 - 3/15/2017        RESOLVED: Hematuria ICD-10-CM: R31.9  ICD-9-CM: 599.70  2016 - 3/15/2017              DISCHARGE MEDICATIONS:         · It is important that you take the medication exactly as they are prescribed. · Keep your medication in the bottles provided by the pharmacist and keep a list of the medication names, dosages, and times to be taken in your wallet.    · Do not take other medications without consulting your doctor. DIET:  Regular Diet    ACTIVITY: Activity as tolerated    ADDITIONAL INFORMATION: If you experience any of the following symptoms then please call your primary care physician or return to the emergency room if you cannot get hold of your doctor: Fever, chills, nausea, vomiting, diarrhea, change in mentation, falling, bleeding, shortness of breath. FOLLOW UP CARE:  Dr. Maya Leija MD  you are to call and set up an appointment to see them with in 1 week. Use incentive spirometry throughout the day    Follow-up with specialists at directed by them      Information obtained by :  I understand that if any problems occur once I am at home I am to contact my physician. I understand and acknowledge receipt of the instructions indicated above.                                                                                                                                            Physician's or R.N.'s Signature                                                                  Date/Time                                                                                                                                              Patient or Representative Signature                                                          Date/Time

## 2017-03-24 NOTE — DISCHARGE SUMMARY
Physician Discharge Summary     Patient ID:    Leonie Simmonds  [id number]  83 y.o.  1970  Dina Nettles MD    Admit date: 3/15/2017    Discharge date and time: 3/24/2017    Admission Diagnoses: Acute hypoxemic respiratory failure ([address]    Chronic Diagnoses:    Problem List as of 3/24/2017  Date Reviewed: 3/15/2017          Codes Class Noted - Resolved    Acute chest wall pain ICD-10-CM: R07.89  ICD-9-CM: 786.52  3/17/2017 - Present        Cough ICD-10-CM: R05  ICD-9-CM: 786.2  3/17/2017 - Present        SOB (shortness of breath) ICD-10-CM: R06.02  ICD-9-CM: 786.05  3/17/2017 - Present        Therapeutic opioid induced constipation ICD-10-CM: K59.03, T40.2X5A  ICD-9-CM: 564.09, E935.2  3/17/2017 - Present        * (Principal)Acute hypoxemic respiratory failure (HCC) ICD-10-CM: J96.01  ICD-9-CM: 518.81  3/15/2017 - Present        Sleep apnea ICD-10-CM: G47.30  ICD-9-CM: 780.57  Unknown - Present        Hypercholesteremia ICD-10-CM: E78.00  ICD-9-CM: 272.0  Unknown - Present        Rib fractures ICD-10-CM: S22.39XA  ICD-9-CM: 807.00  3/15/2017 - Present        Hyperlipidemia ICD-10-CM: E78.5  ICD-9-CM: 272.4  5/31/2016 - Present        GERD (gastroesophageal reflux disease) ICD-10-CM: K21.9  ICD-9-CM: 530.81  5/31/2016 - Present        RESOLVED: Diverticulitis of intestine with abscess ICD-10-CM: K57.80  ICD-9-CM: 562.11, 569.5  6/16/2016 - 3/15/2017        RESOLVED: Diverticulitis ICD-10-CM: K57.92  ICD-9-CM: 562.11  5/31/2016 - 3/15/2017        RESOLVED: Bladder wall thickening ICD-10-CM: N32.89  ICD-9-CM: 596.89  5/31/2016 - 3/15/2017        RESOLVED: Hematuria ICD-10-CM: R31.9  ICD-9-CM: 599.70  5/31/2016 - 3/15/2017              Discharge Medications:   Current Discharge Medication List      START taking these medications    Details   ibuprofen (MOTRIN) 800 mg tablet Take 1 Tab by mouth three (3) times daily.   Qty: 90 Tab, Refills: 0      lidocaine (LIDODERM) 5 % Apply patch to the affected area for 12 hours a day and remove for 12 hours a day. Qty: 30 Each, Refills: 0         CONTINUE these medications which have NOT CHANGED    Details   cetirizine (ZYRTEC) 10 mg tablet Take 10 mg by mouth daily. In the morning. fenofibrate (LOFIBRA) 160 mg tablet Take 160 mg by mouth daily. In the morning. rosuvastatin (CRESTOR) 10 mg tablet Take 10 mg by mouth daily. In the morning. omeprazole (PRILOSEC) 20 mg capsule Take 20 mg by mouth every other day. In the morning. cholecalciferol, vitamin D3, 2,000 unit tab Take 4,000 Units by mouth daily. In the morning. MULTIVIT-MINERALS/FOLIC ACID (DAILY GUMMIES PO) Take 2 Tabs by mouth daily. STOP taking these medications       HYDROcodone-acetaminophen (XODOL) 5-300 mg tablet Comments:   Reason for Stopping: Follow up Care:    Marty Morgan MD with in 1 weeks  2. Pulm specialists as directed. 3. Use incentive spirometry throughout the day    Diet:  Regular Diet    Disposition:  Home. Advanced Directive:    Discharge Exam:  [See today's progress note.]    CONSULTATIONS: Pulmonary/Intensive care    Significant Diagnostic Studies:       HOSPITAL COURSE & TREATMENT RENDERED:   Acute hypoxemic respiratory failure - POA, unclear etiology. Splinting? Narcotic suppression? - improving slowly  ---CT shows rib fractures x2      Sleep apnea - Resumed CPAP      Rib fractures - POA, due to fall.   ---CT of chest without effusions   ---Pain control- now on morphine PCA  ---drug and alcohol screen positive for opiates and THC.  ---Added Toradol  ---Will send home on scheduled Motrin      Hyperlipidemia - Continue rosuvastatin and fenofibrate      GERD (gastroesophageal reflux disease) - Continue PPI      Thrombocytopenia (POA): resolved.       Tobacco abuse - Provide patch. Advised cessation. Spent 5 minutes in addition to all other time spent on admission, noted below.       Subjective:   48 y.o.   male who presented to the Emergency Department complaining of dyspnea. It has progressed over 3 days since a fall in which he injured left ribs. He was placed on PO percocet. DE LA TORRE worsened over days, with cough, inability to clear his thin white sputum, and severe pleuritic pain. ER workup shows severe hypoxia, low platelets, but no signs of PNA. We will admit him for management. (Dr Camara Samples)     3/16: He still feels short of breath at rest. Sats in mid 80s off his CPAP. Better than yesterday but still has some trouble with complete sentences. Still c/o rib pains. He reports any cough or deep breath is painful. D-dimer neg. CT chest pending.      3/17: CT shows rib fractures. Dopplers neg for DVT. Sats are improving. Still having pain.      3/18: Now on Morphine PCA and is more comfortable. Have not been able to draw labs as he is a very hard stick. Oxygen levels have improved. Slept fairly well last night.      3/19: Using PCA frequently. Still c/o severe rib pain. Glucose a little high - check A1C.     3/20: He reports he is feeling better with a little less pain. High flow has been weaned a little. HbA1C was 6.0 on 3/19/17.     3/21: Again sees improvement in pain. Weaned to 4 liters O2 now and sats in 90s. Still requiring PCA. Cont to wean O2.      3/22: More pain and could not be dropped below 4 liters. Still requiring PCA pump. Will try to wean to 3 liters nasal O2 today.     3/23: PCA pump DCed yesterday and still having a great deal of posterior rib pain - walked in nails yesterday and sats stayed in 90s on 3 liters. He feels that his \"lungs are better. Reports Norco does not help pain and causes itching and Dilaudid makes him itch also - will try Tramadol and see if Palliative has another answer. He thinks he has had Tramadol in the past and did not have itching with it. Will also add NSAIDs.      3/24: Doing well. Breathing better. Was off O2 yesterday. Toradol helped with his pain.  He would like to go home.     Review of Systems:   A comprehensive review of systems was negative except for that written in the HPI.     Objective:   Physical Exam:           Visit Vitals    /81 (BP 1 Location: Right arm, BP Patient Position: At rest)    Pulse 75    Temp 97.9 °F (36.6 °C)    Resp 18    Ht 5' 7\" (1.702 m)    Wt 90.2 kg (198 lb 12.8 oz)    SpO2 95%    BMI 31.14 kg/m2    O2 Flow Rate (L/min): 2 l/min O2 Device: Room air     Temp (24hrs), Av.3 °F (36.8 °C), Min:97.9 °F (36.6 °C), Max:98.6 °F (37 °C)      07 -  1900  In: -   Out: 625 [Urine:625]     General:  Alert, cooperative, no distress, appears stated age. Head:  Normocephalic, without obvious abnormality, atraumatic. Eyes:  Conjunctivae/corneas clear. PERRL, EOMs intact. Nose: No drainage or sinus tenderness. Throat: Lips, mucosa, and tongue moist..   Neck: Supple, symmetrical, trachea midline, no adenopathy, thyroid: no enlargement/tenderness/nodules, no carotid bruit and no JVD. No accessory muscle use   Back:  Symmetric, no curvature. ROM normal. No CVA tenderness. Lungs:  Clear to auscultation   Chest wall:  tenderness left ant ax line from mid chest down. No deformity. Heart:  Regular rate and rhythm, S1, S2 normal, no murmur, click, rub or gallop. Abdomen:  Soft, non-tender. Bowel sounds normal. No masses, No organomegaly. Extremities: no cyanosis or edema. No calf tenderness or cords. Pulses: 2+ and symmetric all extremities. Skin: Skin color, texture, turgor normal. No rashes or lesions   Neurologic: CNII-XII intact. Alert and oriented X 3. Fine motor of hands and fingers normal.  equal. No cogwheeling or rigidity. Gait not tested at this time. Sensation grossly normal to touch. Gross motor of extremities normal.       Data Review:   Venous Duplex LE 3/15/17: Bilateral lower extremity venous duplex negative for deep  venous thrombosis or thrombophlebitis     Results for Pratima Chatman (MRN [id number]) as of 3/16/2017 08:50    Ref.  Range 3/15/2017 10:53 3/15/2017 18:22   AMPHETAMINE Latest Ref Range: NEG    NEGATIVE   BARBITURATES Latest Ref Range: NEG    NEGATIVE   BENZODIAZEPINE Latest Ref Range: NEG    NEGATIVE   COCAINE Latest Ref Range: NEG    NEGATIVE   METHADONE Latest Ref Range: NEG    NEGATIVE   OPIATES Latest Ref Range: NEG    POSITIVE (A)   PCP(PHENCYCLIDINE) Latest Ref Range: NEG    NEGATIVE   THC (TH-CANNABINOL) Latest Ref Range: NEG    POSITIVE (A)   ALCOHOL(ETHYL),SERUM Latest Ref Range: <10 MG/DL <10                 Signed:  Bahman Alvarado MD  3/24/2017  7:03 AM

## 2017-03-24 NOTE — PROGRESS NOTES
Daily Progress Note: 3/24/2017  Kojo Blunt MD    Assessment/Plan:   Acute hypoxemic respiratory failure - POA, unclear etiology. Splinting? Narcotic suppression? - improving slowly  ---CT shows rib fractures x2     Sleep apnea - Resumed CPAP     Rib fractures - POA, due to fall.   ---CT of chest without effusions    ---Pain control- now on morphine PCA  ---drug and alcohol screen positive for opiates and THC.  ---Added Toradol  ---Will send home on scheduled Motrin     Hyperlipidemia - Continue rosuvastatin and fenofibrate     GERD (gastroesophageal reflux disease) - Continue PPI     Thrombocytopenia (POA):  resolved.      Tobacco abuse - Provide patch. Advised cessation. Spent 5 minutes in addition to all other time spent on admission, noted below.      Problem List:  Problem List as of 3/24/2017  Date Reviewed: 3/15/2017          Codes Class Noted - Resolved    Acute chest wall pain ICD-10-CM: R07.89  ICD-9-CM: 786.52  3/17/2017 - Present        Cough ICD-10-CM: R05  ICD-9-CM: 786.2  3/17/2017 - Present        SOB (shortness of breath) ICD-10-CM: R06.02  ICD-9-CM: 786.05  3/17/2017 - Present        Therapeutic opioid induced constipation ICD-10-CM: K59.03, T40.2X5A  ICD-9-CM: 564.09, E935.2  3/17/2017 - Present        * (Principal)Acute hypoxemic respiratory failure (HCC) ICD-10-CM: J96.01  ICD-9-CM: 518.81  3/15/2017 - Present        Sleep apnea ICD-10-CM: G47.30  ICD-9-CM: 780.57  Unknown - Present        Hypercholesteremia ICD-10-CM: E78.00  ICD-9-CM: 272.0  Unknown - Present        Rib fractures ICD-10-CM: S22.39XA  ICD-9-CM: 807.00  3/15/2017 - Present        Hyperlipidemia ICD-10-CM: E78.5  ICD-9-CM: 272.4  5/31/2016 - Present        GERD (gastroesophageal reflux disease) ICD-10-CM: K21.9  ICD-9-CM: 530.81  5/31/2016 - Present        RESOLVED: Diverticulitis of intestine with abscess ICD-10-CM: K57.80  ICD-9-CM: 562.11, 569.5  6/16/2016 - 3/15/2017        RESOLVED: Diverticulitis ICD-10-CM: O89.47  ICD-9-CM: 562.11  5/31/2016 - 3/15/2017        RESOLVED: Bladder wall thickening ICD-10-CM: N32.89  ICD-9-CM: 596.89  5/31/2016 - 3/15/2017        RESOLVED: Hematuria ICD-10-CM: R31.9  ICD-9-CM: 599.70  5/31/2016 - 3/15/2017              Subjective:    48 y.o.  male who presented to the Emergency Department complaining of dyspnea. It has progressed over 3 days since a fall in which he injured left ribs. He was placed on PO percocet. [place] worsened over days, with cough, inability to clear his thin white sputum, and severe pleuritic pain. ER workup shows severe hypoxia, low platelets, but no signs of PNA. We will admit him for management. (Dr Maribell Ramos)    3/16:  He still feels short of breath at rest.  Sats in mid 80s off his CPAP. Better than yesterday but still has some trouble with complete sentences. Still c/o rib pains. He reports any cough or deep breath is painful. D-dimer neg. CT chest pending. 3/17: CT shows rib fractures. Dopplers neg for DVT. Sats are improving. Still having pain. 3/18: Now on Morphine PCA and is more comfortable. Have not been able to draw labs as he is a very hard stick. Oxygen levels have improved. Slept fairly well last night. 3/19:  Using PCA frequently. Still c/o severe rib pain. Glucose a little high - check A1C.    3/20:  He reports he is feeling better with a little less pain. High flow has been weaned a little. HbA1C was 6.0 on 3/19/17.    3/21:  Again sees improvement in pain. Weaned to 4 liters O2 now and sats in 90s. Still requiring PCA. Cont to wean O2.     3/22:  More pain and could not be dropped below 4 liters. Still requiring PCA pump. Will try to wean to 3 liters nasal O2 today. 3/23:  PCA pump DCed yesterday and still having a great deal of posterior rib pain - walked in nails yesterday and sats stayed in 90s on 3 liters. He feels that his \"lungs are better.  Reports Norco does not help pain and causes itching and Dilaudid makes him itch also - will try Tramadol and see if Palliative has another answer. He thinks he has had Tramadol in the past and did not have itching with it. Will also add NSAIDs.     3/24:  Doing well. Breathing better. Was off O2 yesterday. Toradol helped with his pain. He would like to go home. Review of Systems:   A comprehensive review of systems was negative except for that written in the HPI. Objective:   Physical Exam:     Visit Vitals    /81 (BP 1 Location: Right arm, BP Patient Position: At rest)    Pulse 75    Temp 97.9 °F (36.6 °C)    Resp 18    Ht 5' 7\" (1.702 m)    Wt 90.2 kg (198 lb 12.8 oz)    SpO2 95%    BMI 31.14 kg/m2    O2 Flow Rate (L/min): 2 l/min O2 Device: Room air    Temp (24hrs), Av.3 °F (36.8 °C), Min:97.9 °F (36.6 °C), Max:98.6 °F (37 °C)         0701 -  1900  In: -   Out: 625 [Urine:625]    General:  Alert, cooperative, no distress, appears stated age. Head:  Normocephalic, without obvious abnormality, atraumatic. Eyes:  Conjunctivae/corneas clear. PERRL, EOMs intact. Nose:  No drainage or sinus tenderness. Throat: Lips, mucosa, and tongue moist..   Neck: Supple, symmetrical, trachea midline, no adenopathy, thyroid: no enlargement/tenderness/nodules, no carotid bruit and no JVD. No accessory muscle use   Back:   Symmetric, no curvature. ROM normal. No CVA tenderness. Lungs:   Clear to auscultation   Chest wall:   tenderness left ant ax line from mid chest down. No deformity. Heart:  Regular rate and rhythm, S1, S2 normal, no murmur, click, rub or gallop. Abdomen:   Soft, non-tender. Bowel sounds normal. No masses,  No organomegaly. Extremities: no cyanosis or edema. No calf tenderness or cords. Pulses: 2+ and symmetric all extremities. Skin: Skin color, texture, turgor normal. No rashes or lesions   Neurologic: CNII-XII intact. Alert and oriented X 3.   Fine motor of hands and fingers normal.   equal.  No cogwheeling or rigidity. Gait not tested at this time. Sensation grossly normal to touch. Gross motor of extremities normal.       Data Review:     Venous Duplex LE 3/15/17:  Bilateral lower extremity venous duplex negative for deep  venous thrombosis or thrombophlebitis    Results for Patrick Ridley (MRN [id number]) as of 3/16/2017 08:50   Ref. Range 3/15/2017 10:53 3/15/2017 18:22   AMPHETAMINE Latest Ref Range: NEG    NEGATIVE   BARBITURATES Latest Ref Range: NEG    NEGATIVE   BENZODIAZEPINE Latest Ref Range: NEG    NEGATIVE   COCAINE Latest Ref Range: NEG    NEGATIVE   METHADONE Latest Ref Range: NEG    NEGATIVE   OPIATES Latest Ref Range: NEG    POSITIVE (A)   PCP(PHENCYCLIDINE) Latest Ref Range: NEG    NEGATIVE   THC (TH-CANNABINOL) Latest Ref Range: NEG    POSITIVE (A)   ALCOHOL(ETHYL),SERUM Latest Ref Range: <10 MG/DL <10        Recent Days:    24 Hour Results:  No results found for this or any previous visit (from the past 24 hour(s)).     Medications reviewed  Current Facility-Administered Medications   Medication Dose Route Frequency    traMADol (ULTRAM) tablet 50 mg  50 mg Oral Q6H PRN    ibuprofen (MOTRIN) tablet 400 mg  400 mg Oral TID    cholecalciferol (VITAMIN D3) tablet 1,000 Units  1,000 Units Oral DAILY    ketorolac (TORADOL) injection 15 mg  15 mg IntraVENous Q8H PRN    nicotine (NICODERM CQ) 21 mg/24 hr patch 1 Patch  1 Patch TransDERmal DAILY    albuterol-ipratropium (DUO-NEB) 2.5 MG-0.5 MG/3 ML  3 mL Nebulization Q6H RT    acetylcysteine (MUCOMYST) 200 mg/mL (20 %) solution 400 mg  2 mL Nebulization BID    morphine injection 8 mg  8 mg IntraVENous Q4H PRN    albuterol (PROVENTIL VENTOLIN) nebulizer solution 2.5 mg  2.5 mg Nebulization Q6H PRN    senna-docusate (PERICOLACE) 8.6-50 mg per tablet 1 Tab  1 Tab Oral BID    acetaminophen (TYLENOL) tablet 650 mg  650 mg Oral Q6H PRN    diphenhydrAMINE (BENADRYL) injection 25 mg  25 mg IntraVENous Q4H PRN    polyethylene glycol (MIRALAX) packet 17 g  17 g Oral DAILY    lidocaine (LIDODERM) 5 % patch 1 Patch  1 Patch TransDERmal Q24H    fenofibrate nanocrystallized (TRICOR) tablet 145 mg  145 mg Oral DAILY    pantoprazole (PROTONIX) tablet 40 mg  40 mg Oral EVERY OTHER DAY    rosuvastatin (CRESTOR) tablet 10 mg  10 mg Oral DAILY    naloxone (NARCAN) injection 0.4 mg  0.4 mg IntraVENous PRN    HYDROcodone-acetaminophen (NORCO) 5-325 mg per tablet 1 Tab  1 Tab Oral Q4H PRN    ondansetron (ZOFRAN) injection 4 mg  4 mg IntraVENous Q4H PRN    guaiFENesin ER (MUCINEX) tablet 600 mg  600 mg Oral Q12H       Care Plan discussed with: Patient/Family/nurse/pulmonary    Total time spent with patient and review of records: 30 minutes.     Víctor Watts MD

## 2017-09-14 ENCOUNTER — ANESTHESIA EVENT (OUTPATIENT)
Dept: SURGERY | Age: 47
End: 2017-09-14
Payer: COMMERCIAL

## 2017-09-14 NOTE — PERIOP NOTES
H&P from the patient's PCP, Dr. Nicky Mendoza, is dated 8/8/2017, which is greater than 30 days from the DOS, 9/15/2017. Spoke to Dr. Vipul Luke regarding this and informed him that he will need to do a full H&P the morning of surgery.   DOS: 9/15/2017

## 2017-09-15 ENCOUNTER — HOSPITAL ENCOUNTER (OUTPATIENT)
Age: 47
Setting detail: OUTPATIENT SURGERY
Discharge: HOME | End: 2017-09-15
Attending: PODIATRIST | Admitting: PODIATRIST
Payer: COMMERCIAL

## 2017-09-15 ENCOUNTER — ANESTHESIA (OUTPATIENT)
Dept: SURGERY | Age: 47
End: 2017-09-15
Payer: COMMERCIAL

## 2017-09-15 ENCOUNTER — APPOINTMENT (OUTPATIENT)
Dept: GENERAL RADIOLOGY | Age: 47
End: 2017-09-15
Attending: PODIATRIST
Payer: COMMERCIAL

## 2017-09-15 VITALS
TEMPERATURE: 98.2 F | HEIGHT: 67 IN | RESPIRATION RATE: 17 BRPM | BODY MASS INDEX: 32.63 KG/M2 | HEART RATE: 81 BPM | OXYGEN SATURATION: 91 % | DIASTOLIC BLOOD PRESSURE: 68 MMHG | SYSTOLIC BLOOD PRESSURE: 125 MMHG | WEIGHT: 207.89 LBS

## 2017-09-15 PROCEDURE — 74011250636 HC RX REV CODE- 250/636: Performed by: PODIATRIST

## 2017-09-15 PROCEDURE — 76001 XR FLUOROSCOPY OVER 60 MINUTES: CPT

## 2017-09-15 PROCEDURE — 74011000250 HC RX REV CODE- 250: Performed by: PODIATRIST

## 2017-09-15 PROCEDURE — 76210000022 HC REC RM PH II 1.5 TO 2 HR: Performed by: PODIATRIST

## 2017-09-15 PROCEDURE — 77030020782 HC GWN BAIR PAWS FLX 3M -B

## 2017-09-15 PROCEDURE — 77030002916 HC SUT ETHLN J&J -A: Performed by: PODIATRIST

## 2017-09-15 PROCEDURE — 77030022323 HC BLD RASP RECIP BRSM -B: Performed by: PODIATRIST

## 2017-09-15 PROCEDURE — 77030031139 HC SUT VCRL2 J&J -A: Performed by: PODIATRIST

## 2017-09-15 PROCEDURE — 76060000033 HC ANESTHESIA 1 TO 1.5 HR: Performed by: PODIATRIST

## 2017-09-15 PROCEDURE — 74011250636 HC RX REV CODE- 250/636

## 2017-09-15 PROCEDURE — 77030018836 HC SOL IRR NACL ICUM -A: Performed by: PODIATRIST

## 2017-09-15 PROCEDURE — 74011250637 HC RX REV CODE- 250/637: Performed by: PODIATRIST

## 2017-09-15 PROCEDURE — 74011250636 HC RX REV CODE- 250/636: Performed by: ANESTHESIOLOGY

## 2017-09-15 PROCEDURE — 77030000032 HC CUF TRNQT ZIMM -B: Performed by: PODIATRIST

## 2017-09-15 PROCEDURE — 76010000149 HC OR TIME 1 TO 1.5 HR: Performed by: PODIATRIST

## 2017-09-15 PROCEDURE — 77030020635 HC BLD ENDOSC DISP STRY -B: Performed by: PODIATRIST

## 2017-09-15 RX ORDER — SODIUM CHLORIDE 0.9 % (FLUSH) 0.9 %
5-10 SYRINGE (ML) INJECTION AS NEEDED
Status: DISCONTINUED | OUTPATIENT
Start: 2017-09-15 | End: 2017-09-15 | Stop reason: HOSPADM

## 2017-09-15 RX ORDER — SODIUM CHLORIDE, SODIUM LACTATE, POTASSIUM CHLORIDE, CALCIUM CHLORIDE 600; 310; 30; 20 MG/100ML; MG/100ML; MG/100ML; MG/100ML
125 INJECTION, SOLUTION INTRAVENOUS CONTINUOUS
Status: DISCONTINUED | OUTPATIENT
Start: 2017-09-15 | End: 2017-09-15 | Stop reason: HOSPADM

## 2017-09-15 RX ORDER — SODIUM CHLORIDE 0.9 % (FLUSH) 0.9 %
5-10 SYRINGE (ML) INJECTION EVERY 8 HOURS
Status: DISCONTINUED | OUTPATIENT
Start: 2017-09-15 | End: 2017-09-15 | Stop reason: HOSPADM

## 2017-09-15 RX ORDER — LIDOCAINE HYDROCHLORIDE 10 MG/ML
0.1 INJECTION, SOLUTION EPIDURAL; INFILTRATION; INTRACAUDAL; PERINEURAL AS NEEDED
Status: DISCONTINUED | OUTPATIENT
Start: 2017-09-15 | End: 2017-09-15 | Stop reason: HOSPADM

## 2017-09-15 RX ORDER — BUPIVACAINE HYDROCHLORIDE 5 MG/ML
INJECTION, SOLUTION EPIDURAL; INTRACAUDAL AS NEEDED
Status: DISCONTINUED | OUTPATIENT
Start: 2017-09-15 | End: 2017-09-15 | Stop reason: HOSPADM

## 2017-09-15 RX ORDER — HYDROCODONE BITARTRATE AND ACETAMINOPHEN 5; 325 MG/1; MG/1
2 TABLET ORAL
Status: COMPLETED | OUTPATIENT
Start: 2017-09-15 | End: 2017-09-15

## 2017-09-15 RX ORDER — ONDANSETRON 2 MG/ML
4 INJECTION INTRAMUSCULAR; INTRAVENOUS AS NEEDED
Status: DISCONTINUED | OUTPATIENT
Start: 2017-09-15 | End: 2017-09-15 | Stop reason: HOSPADM

## 2017-09-15 RX ORDER — VANCOMYCIN/0.9 % SOD CHLORIDE 1.5G/250ML
1500 PLASTIC BAG, INJECTION (ML) INTRAVENOUS
Status: DISCONTINUED | OUTPATIENT
Start: 2017-09-15 | End: 2017-09-15

## 2017-09-15 RX ORDER — MIDAZOLAM HYDROCHLORIDE 1 MG/ML
INJECTION, SOLUTION INTRAMUSCULAR; INTRAVENOUS AS NEEDED
Status: DISCONTINUED | OUTPATIENT
Start: 2017-09-15 | End: 2017-09-15 | Stop reason: HOSPADM

## 2017-09-15 RX ORDER — PROPOFOL 10 MG/ML
INJECTION, EMULSION INTRAVENOUS
Status: DISCONTINUED | OUTPATIENT
Start: 2017-09-15 | End: 2017-09-15 | Stop reason: HOSPADM

## 2017-09-15 RX ORDER — FENTANYL CITRATE 50 UG/ML
INJECTION, SOLUTION INTRAMUSCULAR; INTRAVENOUS AS NEEDED
Status: DISCONTINUED | OUTPATIENT
Start: 2017-09-15 | End: 2017-09-15 | Stop reason: HOSPADM

## 2017-09-15 RX ORDER — PROPOFOL 10 MG/ML
INJECTION, EMULSION INTRAVENOUS AS NEEDED
Status: DISCONTINUED | OUTPATIENT
Start: 2017-09-15 | End: 2017-09-15 | Stop reason: HOSPADM

## 2017-09-15 RX ORDER — VANCOMYCIN/0.9 % SOD CHLORIDE 1.5G/250ML
1500 PLASTIC BAG, INJECTION (ML) INTRAVENOUS
Status: COMPLETED | OUTPATIENT
Start: 2017-09-15 | End: 2017-09-15

## 2017-09-15 RX ADMIN — SODIUM CHLORIDE, POTASSIUM CHLORIDE, SODIUM LACTATE AND CALCIUM CHLORIDE: 600; 310; 30; 20 INJECTION, SOLUTION INTRAVENOUS at 07:35

## 2017-09-15 RX ADMIN — FENTANYL CITRATE 50 MCG: 50 INJECTION, SOLUTION INTRAMUSCULAR; INTRAVENOUS at 07:35

## 2017-09-15 RX ADMIN — VANCOMYCIN HYDROCHLORIDE 1500 MG: 10 INJECTION, POWDER, LYOPHILIZED, FOR SOLUTION INTRAVENOUS at 07:02

## 2017-09-15 RX ADMIN — FENTANYL CITRATE 25 MCG: 50 INJECTION, SOLUTION INTRAMUSCULAR; INTRAVENOUS at 07:47

## 2017-09-15 RX ADMIN — PROPOFOL 40 MG: 10 INJECTION, EMULSION INTRAVENOUS at 08:01

## 2017-09-15 RX ADMIN — FENTANYL CITRATE 25 MCG: 50 INJECTION, SOLUTION INTRAMUSCULAR; INTRAVENOUS at 07:57

## 2017-09-15 RX ADMIN — MIDAZOLAM HYDROCHLORIDE 2 MG: 1 INJECTION, SOLUTION INTRAMUSCULAR; INTRAVENOUS at 07:40

## 2017-09-15 RX ADMIN — PROPOFOL 40 MG: 10 INJECTION, EMULSION INTRAVENOUS at 07:55

## 2017-09-15 RX ADMIN — MIDAZOLAM HYDROCHLORIDE 3 MG: 1 INJECTION, SOLUTION INTRAMUSCULAR; INTRAVENOUS at 07:35

## 2017-09-15 RX ADMIN — HYDROCODONE BITARTRATE AND ACETAMINOPHEN 2 TABLET: 5; 325 TABLET ORAL at 09:50

## 2017-09-15 RX ADMIN — PROPOFOL 50 MCG/KG/MIN: 10 INJECTION, EMULSION INTRAVENOUS at 07:45

## 2017-09-15 NOTE — ANESTHESIA POSTPROCEDURE EVALUATION
Post-Anesthesia Evaluation and Assessment    Patient: J Carlso Valenzuela MRN: [id number]  SSN: [id number]    YOB: 1970  Age: 50 y.o. Sex: male       Cardiovascular Function/Vital Signs  Visit Vitals    /68 (BP 1 Location: Left arm, BP Patient Position: At rest)    Pulse 81    Temp 36.8 °C (98.2 °F)    Resp 17    Ht 5' 7\" (1.702 m)    Wt 94.3 kg (207 lb 14.3 oz)    SpO2 91%    BMI 32.56 kg/m2       Patient is status post MAC anesthesia for Procedure(s):  RIGHT FOOT ENDOSCOPIC PLANTAR FASCIOTOMY, HEEL SPUR RESECTION. Nausea/Vomiting: None    Postoperative hydration reviewed and adequate. Pain:  Pain Scale 1: Numeric (0 - 10) (09/15/17 1015)  Pain Intensity 1: 2 (09/15/17 1015)   Managed    Neurological Status:   Neuro (WDL): Within Defined Limits (09/15/17 1015)  Neuro  LUE Motor Response: Purposeful (09/15/17 1015)  LLE Motor Response: Purposeful (09/15/17 1015)  RUE Motor Response: Purposeful (09/15/17 1015)  RLE Motor Response: Numbness (09/15/17 1015)   At baseline    Mental Status and Level of Consciousness: Arousable    Pulmonary Status:   O2 Device: Room air (09/15/17 1015)   Adequate oxygenation and airway patent    Complications related to anesthesia: None    Post-anesthesia assessment completed.  No concerns    Signed By: Enoch Solano MD     September 15, 2017

## 2017-09-15 NOTE — ANESTHESIA PREPROCEDURE EVALUATION
Anesthetic History   No history of anesthetic complications            Review of Systems / Medical History  Patient summary reviewed and pertinent labs reviewed    Pulmonary        Sleep apnea: CPAP           Neuro/Psych   Within defined limits           Cardiovascular                  Exercise tolerance: >4 METS     GI/Hepatic/Renal     GERD: well controlled           Endo/Other  Within defined limits           Other Findings              Physical Exam    Airway  Mallampati: III  TM Distance: 4 - 6 cm  Neck ROM: normal range of motion   Mouth opening: Normal     Cardiovascular  Regular rate and rhythm,  S1 and S2 normal,  no murmur, click, rub, or gallop  Rhythm: regular  Rate: normal         Dental  No notable dental hx       Pulmonary  Breath sounds clear to auscultation               Abdominal  GI exam deferred       Other Findings            Anesthetic Plan    ASA: 3  Anesthesia type: MAC            Anesthetic plan and risks discussed with: Patient

## 2017-09-15 NOTE — BRIEF OP NOTE
BRIEF OPERATIVE NOTE    Date of Procedure: 9/15/2017   Preoperative Diagnosis: PLANTAR FASCIITIS, HEEL SPUR  Postoperative Diagnosis: PLANTAR FASCIITIS, HEEL SPUR    Procedure(s):  RIGHT FOOT ENDOSCOPIC PLANTAR FASCIOTOMY, HEEL SPUR RESECTION  Surgeon(s) and Role:     * John Adkins DPM - Primary         Assistant Staff:       Surgical Staff:  Circ-1: Venita Mckeon RN  Scrub Tech-1: Nurys Nina  Retractor Hernandez: Mannie Redman  [place]  Event Time In   Incision Start 0803   Incision Close 0858     Anesthesia: MAC   Estimated Blood Loss: Minimal  Specimens: None   Findings: Heel spur resected   Complications: None  Implants: * No implants in log *

## 2017-09-15 NOTE — OP NOTES
[facility] [address]   [facility], [address]   OP NOTE       Name:  Hector Morales   MR#:  [id number]   :  1970   Account #:  [de-identified]    Surgery Date:  09/15/2017   Date of Adm:  09/15/2017       PREOPERATIVE DIAGNOSES   1. Right foot plantar fasciitis. 2. Right foot plantar calcaneal spur. POSTOPERATIVE DIAGNOSES   1. Right foot plantar fasciitis. 2. Right foot plantar calcaneal spur. PROCEDURES PERFORMED   1. Right foot endoscopic plantar fasciotomy. 2. Right foot plantar calcaneal spur resection. SURGEON: Laith Gould DPM    ASSISTANT: Documented in brief OP note. SPECIMENS REMOVED: None sent. ANESTHESIA: MAC, with 17 mL of 1:1 mixture of 1% lidocaine plain   and 0.5% Marcaine plain. HEMOSTASIS: Achieved with a pneumatic ankle tourniquet at 250   mmHg for 60 minutes. ESTIMATED BLOOD LOSS: Minimal.    MATERIALS USED: 3-0 Vicryl, 3-0 nylon. INJECTABLES: 5 mL 0.5% Marcaine plain. COMPLICATIONS: None. CONDITION: Stable. INDICATION FOR PROCEDURE: The patient is a 61-year-old male   who presented to the preoperative holding area for right heel pain. He   has had this problem for over 6 months. He has tried conservative   treatment and failed. He would like and require surgical intervention to   improve his pain. Upon review of x-ray, there is a large plantar heel   spur noted. He states that he is not supposed to take ibuprofen post op since he had a colonoscopy 5 days ago. He has taken Vicodin in the past and states that it helps with pain control. DESCRIPTION OF PROCEDURE: The patient was identified in the   preoperative holding area. Consent was signed. Site and side were   marked. The patient was n.p.o. since midnight. The surgical procedure   was discussed in detail. The patient denies any shortness of breath or   chest pain.  The patient was cleared by nursing and anesthesia, taken   back to the operating room and sedated. Attention was then directed to   the right lower extremity, where a right ankle tourniquet was placed on   the right ankle. The right foot and ankle were sterilely prepped and   draped. All surgical staff were scrubbed and gowned in a sterile   manner. Attention was then directed to the right foot. A time-out was   performed. A local block was given consisting of 17 mL of a 1:1   mixture of 1% lidocaine plain and 0.5% Marcaine plain to the right   ankle and heel. An Esmarch was used to exsanguinate the foot. The   tourniquet was inflated. A skin marker was used to draw an incision   line over the right heel. A 15 blade was used to make the incision. Blunt dissection was achieved with a hemostat. A fascial elevator was   placed into the medial aspect of the right heel, where an incision was   made. Fascial elevator was moved on the plantar aspect of the plantar   fascia towards the lateral aspect of the foot. Skin puckering was noted   on the plantar aspect of the foot, and a new 15 blade was used to   make a lateral incision at the tenting area of the elevator. The elevator   was removed. A trocar and cannula were placed into the same portal.   Puckering was noted. Q-tips were used to clean the cannula. Camera   was placed into the portal. Clear visualization of the plantar fascia was   noted. Camera was then placed at the lateral portal. Multiple pictures   were taken. A triangle blade was inserted into the medial portal. The   Medial and central plantar fascia bands were clearly identified with fat in between the bands. The medial and central   plantar fascial bands were transected using the triangle blade. Multiple   pictures were taken during the procedure. The area was flushed and   all instruments were removed from the right foot. A 15 blade was then   used to extend the medial incision proximally. A hemostat was used for   blunt dissection. A plantar calcaneal spur was identified.  A power rasp   was used resect the plantar calcaneal spur. The areas were flushed. Fluoroscopy was used during the resection of plantar calcaneal spur. The medial incision was closed with 3-0 Vicryl and 3-0 nylon. The   lateral incision was closed with 3-0 nylon. The right foot was injected   with 5 mL 0.5% Marcaine plain. The right foot was dressed with   Betadine-soaked Adaptic, dry sterile dressings and Coban for   compression. The patient tolerated the procedure well and was   transferred to the PACU with stable vitals and neurovascular status   intact. Postoperative instructions were to remain nonweightbearing to   his right foot. He was given a surgical shoe and crutches. He was   given pain medication for postoperative pain control. He is to follow up   with Dr. Cintia Knight in 1 week.         Merlinda Patient, ASHLY SMITH / Margot Sheets   D:  09/15/2017   09:26   T:  09/15/2017   10:17   Job #:  [id number]

## 2017-09-15 NOTE — IP AVS SNAPSHOT
Zeferino Chesterton 
 
 
 [address] [facility] [address] [address] [facility] 
[phone] Patient: Bo Acuna MRN: [id number] FCP:7/9/0315 You are allergic to the following Allergen Reactions Wellbutrin (Bupropion) Swelling Angioedema Adhesive Tape-Silicones Contact Dermatitis Pt reports he can tolerate paper tape. Azithromycin Hives Cephalexin Hives Pt has taken Augmentin before and tolerated it per rn Hydromorphone Itching Nausea Only Ketamine Hives Swelling Recent Documentation Height Weight BMI Smoking Status 1.702 m 94.3 kg 32.56 kg/m2 Current Every Day Smoker Emergency Contacts Name Discharge Info Relation Home Work Mobile [address] CAREGIVER [3] Spouse [3] [phone] About your hospitalization You were admitted on:  September 15, 2017 You last received care in the:  [facility] PACU You were discharged on:  September 15, 2017 Unit phone number:  [phone] Why you were hospitalized Your primary diagnosis was:  Not on File Providers Seen During Your Hospitalizations Provider Role Specialty Primary office phone Melyssa Lester DPM Attending Provider Podiatry [phone] Your Primary Care Physician (PCP) Primary Care Physician Office Phone Office Fax Katy Blanton [phone] Follow-up Information Follow up With Details Comments Contact Info Laith Gould DPM In 1 week  [address]  [address] [facility] 
[phone] Karlosnikolai Rolando, [address] [facility] 100[address] [facility] 
[phone] Current Discharge Medication List  
  
ASK your doctor about these medications Dose & Instructions Dispensing Information Comments Morning Noon Evening Bedtime  
 cetirizine 10 mg tablet Commonly known as:  ZYRTEC  
   
 Your last dose was: Your next dose is:    
   
   
 Dose:  10 mg Take 10 mg by mouth daily. In the morning. Refills:  0  
     
   
   
   
  
 cholecalciferol (vitamin D3) 2,000 unit Tab Your last dose was: Your next dose is:    
   
   
 Dose:  4000 Units Take 4,000 Units by mouth daily. In the morning. Refills:  0 DAILY GUMMIES PO Your last dose was: Your next dose is:    
   
   
 Dose:  2 Tab Take 2 Tabs by mouth daily. Refills:  0  
     
   
   
   
  
 fenofibrate 160 mg tablet Commonly known as:  LOFIBRA Your last dose was: Your next dose is:    
   
   
 Dose:  160 mg Take 160 mg by mouth daily. In the morning. Refills:  0  
     
   
   
   
  
 ibuprofen 800 mg tablet Commonly known as:  MOTRIN Your last dose was: Your next dose is:    
   
   
 Dose:  800 mg Take 1 Tab by mouth three (3) times daily. Quantity:  90 Tab Refills:  0  
     
   
   
   
  
 lidocaine 5 % Commonly known as:  Lynita Adrián Your last dose was: Your next dose is:    
   
   
 Apply patch to the affected area for 12 hours a day and remove for 12 hours a day. Quantity:  30 Each Refills:  0  
     
   
   
   
  
 omeprazole 20 mg capsule Commonly known as:  PRILOSEC Your last dose was: Your next dose is:    
   
   
 Dose:  20 mg Take 20 mg by mouth every other day. In the morning. Refills:  0  
     
   
   
   
  
 rosuvastatin 10 mg tablet Commonly known as:  CRESTOR Your last dose was: Your next dose is:    
   
   
 Dose:  10 mg Take 10 mg by mouth daily. In the morning. Refills:  0 Discharge Instructions Crutch Ambulation Precautions Your doctor has ordered crutch use to aid in your post-operative healing and to allow you to get around your home environment as you heal. Evidence and research has show that early mobilization speeds healing and recovery, but too much activity too soon can slow your progress down. It is important that you follow your doctors orders carefully. Your doctor has prescribed the following for you: 
 
       Non-Weight Bearing with your injured and healing leg until your follow up,     and then your progress will be evaluated at that time. Non-weight Bearing until your motor-sensory blockade has resolved, and     then+: 
 
       Touch-Down Weight Bearing 25% Weight Bearing 50% Weight Bearing Advance to Full Weight Bearing as tolerated, crutches for support and as     needed. Partial Weight Bearin-point Gait Non-Weight Bearing: 3-point Swing Through Gait Crutch walking seems simple when watching someone else do it. However, if not done correctly, it can be very difficult and dangerous. Here are some tips and reminders that will be helpful to you at home. (Non-Weight bearing shown) 1. When coming to a standing position from a sitting position, remember: 
a. Place both crutches in the hand opposite the side of your injury. b. Lean forward; push down on crutches and arm of chair. c. Stand up, straighten your good leg and get your balance. Once you are balanced, move crutches to proper place under arms. Get balanced again before attempting to walk. (Non-Weight bearing shown) 2. When Walking, Remember: 
a. Place your crutches out approximately 10-12 inches in front of you with a wide enough stance for your hips to fit through. b. Push down on crutch hand rests - this is the only place where your weight is to be distributed. c. Swing your \"good\" leg forward to meet up with the crutch location.   Once you get the hang of it, you can swing your \"good\" leg approximately 10-12 inches past the crutch location, but in the beginning when you are just getting comfortable with crutch use, GO SLOW and TAKE YOUR TIME. d. The \"arm pit\" pads are NOT for putting your weight on. Rather, the pads are for squeezing between the inside of your arm and the chest wall to keep crutch from moving when you are walking. You should have a 2-3 finger-width space between the armpit and the pad. (Non-Weight bearing shown) 3. When sitting: 
a. Back up to chair until the back of the uninvolved good leg touches the chair or seat.   
b. Move both crutches to the \"injured leg\" side. c. Hold crutches at hand  area. d. Reach back with free hand for arm of chair, slide involved leg forward and lower yourself slowly onto seat. (Note: The pictures above show a non-weight bearing patient negotiating stairs and the injured leg away from load bearing. If you are allowed limited or full weight bearing on your surgical leg/foot, the surgical leg position should correspond to the location of the crutches on the stair step.) 4. When climbing stairs, remember: \"Up with the good; down with the bad. \"            
a. This means when going up stairs, you: step your good leg up first, then the crutches and your bad leg follow. b. When going down stairs, the crutches and your bad leg step down first, followed by your good leg. Remember: Take it slowly! In the examples above you are shown non-weight bearing as this is the most difficult technique to master. However, partial weight bearing is also used depending on what your doctor prescribes after surgery. With partial weight bearin. The injured leg is allowed to carry a prescribed amount of your body weight. 2. Touch-down weight bearing - toes touch the ground with least amount of weight bearing 3. 25% - 100% weight bearing - allow for a quarter to all of your body weight to be carried by your injured and healing leg, depending on your doctors orders. 4. Typically, apply as much weight to your injured leg as you can tolerate. If there is pain, you may be doing too much weight bearing and you may need to slow your progress down. \"Let pain be your guide. \" Most importantly, follow your doctor's instructions carefully. Doing too much too soon can possibly impede your healing progress and delay your recovery. Hints and Safety: 
? Never leave crutches behind and attempt to hop where you are going. ? Always use good posture. Do not lean on arm pits, this can cause severe nerve damage in arms. ? Inspect crutch tips periodically and replace as necessary. ? Dont play on crutches. ? Remove or set aside things on the floor that can trip someone on crutches, like throw rugs, loose wires or extension cords, clutter on the floor. ? Use caution if you have slick, waxed or wet floors, small pets, uneven floors or deep carpet. Be careful, think, and take your time! Physical Therapy referral made before discharge for crutch training and evaluation. [facility] Podiatric Medicine - Foot Surgery [address] [place] [address]. [id number] OFFICE [phone] CELL    [phone] You have just had surgery on your foot and/or ankle. Proper care during the post-operative period is an integral part of your surgical treatment program.  It is imperative that these instructions are followed to insure proper healing and to obtain the best results. 1. GO directly home and keep your feet elevated on the way. 2. DRESSING OR CAST - Keep your dressing or cast clean and dry. DO NOT remove the bandage or inspect the wound. A small amount of blood on the bandage is normal.  If the dressing falls off or gets wet, call the office immediately. 3. ELEVATION - Place two pillows under the knee and leg. Make sure to support underneath your knees.  You should elevate your leg whenever you are sitting or lying down. This includes mealtime and when retiring for the night. 4. ICE - Apply 1 or 2 small bags of ice close to, BUT NOT DIRECTLY OVER, the surgical site. The ice should be ON FOR TWENTY MINUTES, THEN OFF FOR ONE HALF HOUR. Continue this sequence throughout the day. Remember to keep the dressing or cast dry. Double-bagging the ice will help. 5. Limited pain and swelling is expected. 6. Exercise your legs frequently by bending your knees to stimulate circulation and speed healing. 7. You are to be:  
· NON-WEIGHT BEARING - you are not allowed to touch your foot to the floor and/or ground. You must use crutches or a walker at all times. 8. If you have a surgical shoe - it should be worn whenever you are standing. 9. MEALS - Your first meal at home should be a light one such as toast or soup. If nausea and vomiting develop call the office immediately. Drink plenty of fluid and resume a well balanced diet. 10. Sponge baths are recommended until your first post-operative appointment. 11. PRESCRIPTIONS - Please fill and take as directed. If you have any difficulties or experience any side effects after taking this medication please discontinue and call the office and/or go to your closest Emergency Room immediately. Call our office to make your next appointment; Also, if you have any of the following: · Temperature above 100.5 degrees · Your bandage becomes overly stained, falls off or gets wet · You bump or injure the surgical site · Your medication does not stop discomfort WE WANT YOUR SURGERY AND RECOVERY TO BE SUCCESSFUL AND AS COMFORTABLE AS POSSIBLE. THANK YOU FOR FOLLOWING THESE INSTRUCTIONS. IF YOU HAVE ANY PROBLEMS OR QUESTIONS PLEASE CALL OUR OFFICE. DISCHARGE SUMMARY from your Nurse The following personal items collected during your admission are returned to you:  
Dental Appliance: Dental Appliances: None Vision: Visual Aid: None Hearing Aid:   
 Jewelry: Jewelry: None Clothing: Clothing: Footwear, Pants, Shirt, Undergarments Other Valuables: Other Valuables: None Valuables sent to safe:   
 
PATIENT INSTRUCTIONS: 
 
After general anesthesia or intravenous sedation, for 24 hours or while taking prescription Narcotics: · Limit your activities · Do not drive and operate hazardous machinery · Do not make important personal or business decisions · Do  not drink alcoholic beverages · If you have not urinated within 8 hours after discharge, please contact your surgeon on call. Report the following to your surgeon: 
· Excessive pain, swelling, redness or odor of or around the surgical area · Temperature over 100.5 · Nausea and vomiting lasting longer than 4 hours or if unable to take medications · Any signs of decreased circulation or nerve impairment to extremity: change in color, persistent  numbness, tingling, coldness or increase pain · Any questions [address] Breathing deep and coughing are very important exercises to do after surgery. Deep breathing and coughing open the little air tubes and air sacks in your lungs. You take deep breaths every day. You may not even notice - it is just something you do when you sigh or yawn. It is a natural exercise you do to keep these air passages open. After surgery, take deep breaths and cough, on purpose. Coughing and deep breathing help prevent bronchitis and pneumonia after surgery. If you had chest or belly surgery, use a pillow as a \"hug buddy\" and hold it tightly to your chest or belly when you cough. DIRECTIONS: 
10. Take 10 to 15 slow deep breaths every hour while awake. 11. Breathe in deeply, and hold it for 2 seconds. 12. Exhale slowly through puckered lips, like blowing up a balloon. 13. After every 4th or 5th deep breath, hug your pillow to your chest or belly and give a hard, deep cough. Yes, it will probably hurt. But doing this exercise is very important part of healing after surgery. Take your pain medicine to help you do this exercise without too much pain. IF YOU HAVE BEEN DIAGNOSED WITH SLEEP APNEA, PLEASE USE YOUR SLEEP APNEA DEVICE OR CPAP MACHINE WHEN YOU INTEND TO NAP AFTER TAKING PAIN MEDICATION. Ankle Pumps Ankle pumps increase the circulation of oxygenated blood to your lower extremities and decrease your risk for circulation problems such as blood clots. They also stretch the muscles, tendons and ligaments in your foot and ankle, and prevent joint contracture in the ankle and foot, especially after surgeries on the legs. It is important to do ankle pump exercises regularly after surgery because immobility increases your risk for developing a blood clot. Your doctor may also have you take an Aspirin for the next few days as well. If your doctor did not ask you to take an Aspirin, consult with him before starting Aspirin therapy on your own. Slowly point your foot forward, feeling the muscles on the top of your lower leg stretch, and hold this position for 5 seconds. Next, pull your foot back toward you as far as possible, stretching the calf muscles, and hold that position for 5 seconds. Repeat with the other foot. Perform 10 repetitions every hour while awake for both ankles if possible (down and then up with the foot once is one repetition). You should feel gentle stretching of the muscles in your lower leg when doing this exercise. If you feel pain, or your range of motion is limited, don't  Push too hard. Only go the limit your joint and muscles will let you go. If you have increasing pain, progressively worsening leg warmth or swelling, STOP the exercise and call your doctor. Below is information about the medications your doctor is prescribing after your visit: 
 
 
· norco 
 
 
 
 
Discharge Orders None Introducing [facility]! [facility] introduces [facility] patient portal. Now you can access parts of your medical record, email your doctor's office, and request medication refills online. 1. In your internet browser, go to [email or web address] 2. Click on the First Time User? Click Here link in the Sign In box. You will see the New Member Sign Up page. 3. Enter your [facility] Access Code exactly as it appears below. You will not need to use this code after youve completed the sign-up process. If you do not sign up before the expiration date, you must request a new code. · [employer] Access Code: [email or web address] Expires: 12/14/2017  5:43 AM 
 
4. Enter the last four digits of your Social Security Number (xxxx) and Date of Birth (mm/dd/yyyy) as indicated and click Submit. You will be taken to the next sign-up page. 5. Create a [employer] ID. This will be your [employer] login ID and cannot be changed, so think of one that is secure and easy to remember. 6. Create a [employer] password. You can change your password at any time. 7. Enter your Password Reset Question and Answer. This can be used at a later time if you forget your password. 8. Enter your e-mail address. You will receive e-mail notification when new information is available in [address] 9. Click Sign Up. You can now view and download portions of your medical record. 10. Click the Download Summary menu link to download a portable copy of your medical information. If you have questions, please visit the Frequently Asked Questions section of the [employer] website. Remember, [employer] is NOT to be used for urgent needs. For medical emergencies, dial 911. Now available from your iPhone and Android! General Information Please provide this summary of care documentation to your next provider. Patient Signature:  ____________________________________________________________ Date:  ____________________________________________________________  
  
Larri Hazard Provider Signature:  ____________________________________________________________ Date:  ____________________________________________________________

## 2017-09-15 NOTE — IP AVS SNAPSHOT
[address] [facility] 
 
 
 [address] 
[phone] Patient: J Carlos Valenzuela MRN: [id number] DGW:[birth year] Current Discharge Medication List  
  
ASK your doctor about these medications Dose & Instructions Dispensing Information Comments Morning Noon Evening Bedtime  
 cetirizine 10 mg tablet Commonly known as:  ZYRTEC Your last dose was: Your next dose is:    
   
   
 Dose:  10 mg Take 10 mg by mouth daily. In the morning. Refills:  0  
     
   
   
   
  
 cholecalciferol (vitamin D3) 2,000 unit Tab Your last dose was: Your next dose is:    
   
   
 Dose:  4000 Units Take 4,000 Units by mouth daily. In the morning. Refills:  0 DAILY GUMMIES PO Your last dose was: Your next dose is:    
   
   
 Dose:  2 Tab Take 2 Tabs by mouth daily. Refills:  0  
     
   
   
   
  
 fenofibrate 160 mg tablet Commonly known as:  LOFIBRA Your last dose was: Your next dose is:    
   
   
 Dose:  160 mg Take 160 mg by mouth daily. In the morning. Refills:  0  
     
   
   
   
  
 ibuprofen 800 mg tablet Commonly known as:  MOTRIN Your last dose was: Your next dose is:    
   
   
 Dose:  800 mg Take 1 Tab by mouth three (3) times daily. Quantity:  90 Tab Refills:  0  
     
   
   
   
  
 lidocaine 5 % Commonly known as:  Valrico Halo Your last dose was: Your next dose is:    
   
   
 Apply patch to the affected area for 12 hours a day and remove for 12 hours a day. Quantity:  30 Each Refills:  0  
     
   
   
   
  
 omeprazole 20 mg capsule Commonly known as:  PRILOSEC Your last dose was: Your next dose is:    
   
   
 Dose:  20 mg Take 20 mg by mouth every other day. In the morning. Refills:  0  
     
   
   
   
  
 rosuvastatin 10 mg tablet Commonly known as:  CRESTOR  
   
 Your last dose was: Your next dose is:    
   
   
 Dose:  10 mg Take 10 mg by mouth daily. In the morning. Refills:  0

## 2017-09-15 NOTE — H&P
Podiatry History and Physical    Subjective:      Faye Guillory is a 50 y.o. male who presents for evaluation of right heel pain. Onset was over 6 months ago. Symptoms have been unchanged with conservative treatment. Aggravating factors: first step in the morning. . The patient denies n/v/c/f/sob/cp. Pain is described as Estella Ill on ambulation. Past Medical History:   Diagnosis Date    Diverticula of colon     GERD (gastroesophageal reflux disease)     occasional    Hypercholesteremia     Microscopic hematuria     Sleep apnea      Past Surgical History:   Procedure Laterality Date    ABDOMEN SURGERY PROC UNLISTED  06/2016    bowel resec with illeostomy    HX APPENDECTOMY      HX HEENT  2008    left ear TM graft    HX HEENT  Sep 2009    left ear surgery - cholestoma    HX HEENT  Sep 2010    left ear TM graft revision    HX HEENT  Aug 1998    left ear tube placement    HX ORTHOPAEDIC      left knee A/S x 3    HX ORTHOPAEDIC      left knee ACL repair      Family History   Problem Relation Age of Onset    Heart Disease Father     Hypertension Sister      Social History   Substance Use Topics    Smoking status: Current Every Day Smoker     Packs/day: 1.00     Years: 27.00    Smokeless tobacco: Never Used    Alcohol use 1.0 oz/week     1 Cans of beer, 1 Standard drinks or equivalent per week      Comment: 3x/ wk      Prior to Admission medications    Medication Sig Start Date End Date Taking? Authorizing Provider   lidocaine (LIDODERM) 5 % Apply patch to the affected area for 12 hours a day and remove for 12 hours a day. 3/24/17  Yes Esha Galvin MD   cetirizine (ZYRTEC) 10 mg tablet Take 10 mg by mouth daily. In the morning. Yes Historical Provider   fenofibrate (LOFIBRA) 160 mg tablet Take 160 mg by mouth daily. In the morning. Yes Historical Provider   rosuvastatin (CRESTOR) 10 mg tablet Take 10 mg by mouth daily. In the morning.    Yes Historical Provider   omeprazole (PRILOSEC) 20 mg capsule Take 20 mg by mouth every other day. In the morning. Yes Historical Provider   cholecalciferol, vitamin D3, 2,000 unit tab Take 4,000 Units by mouth daily. In the morning. Yes Historical Provider   MULTIVIT-MINERALS/FOLIC ACID (DAILY GUMMIES PO) Take 2 Tabs by mouth daily. Yes Historical Provider   ibuprofen (MOTRIN) 800 mg tablet Take 1 Tab by mouth three (3) times daily. 3/24/17   Fatimah Colin MD        Allergies   Allergen Reactions    Wellbutrin [Bupropion] Swelling     Angioedema      Adhesive Tape-Silicones Contact Dermatitis     Pt reports he can tolerate paper tape.  Azithromycin Hives    Cephalexin Hives     Pt has taken Augmentin before and tolerated it per rn    Hydromorphone Itching and Nausea Only    Ketamine Hives and Swelling       Review of Systems:   A comprehensive review of systems was negative except for that written in the History of Present Illness. Objective:     Visit Vitals    BP (!) 145/91 (BP 1 Location: Left arm, BP Patient Position: At rest)    Pulse 77    Temp 97.4 °F (36.3 °C)    Resp 16    Ht 5' 7\" (1.702 m)    Wt 94.3 kg (207 lb 14.3 oz)    SpO2 95%    BMI 32.56 kg/m2       Physical Exam:   Pain with palpation right heel at the origin of the plantar fascia. Equinus is noted right foot. Heart: RRR. No murmurs, rubs, gallops. Lungs: Clear to Auscultation bilaterally, Good air movement. Xrays: Plantar heel spur is noted. No fracture or dislocation. Assessment:     Right plantar fascitis and heel spur. Plan:     Patient is NPO since midnight. Consent signed and in chart. No guarantees made to the outcome of the procedure. All risks complications and benefits explained. To OR for Right foot EPF and Heel spur resection.     Signed By: Yue Glez DPM     September 15, 2017

## 2017-09-15 NOTE — DISCHARGE INSTRUCTIONS
Crutch Ambulation Precautions    Your doctor has ordered crutch use to aid in your post-operative healing and to allow you to get around your home environment as you heal.  Evidence and research has show that early mobilization speeds healing and recovery, but too much activity too soon can slow your progress down. It is important that you follow your doctors orders carefully. Your doctor has prescribed the following for you:    []       Non-Weight Bearing with your injured and healing leg until your follow up,     and then your progress will be evaluated at that time. []       Non-weight Bearing until your motor-sensory blockade has resolved, and     then+:    []       Touch-Down Weight Bearing  []       25% Weight Bearing  []       50% Weight Bearing  []       Advance to Full Weight Bearing as tolerated, crutches for support and as     needed. Partial Weight Bearin-point Gait  Non-Weight Bearing: 3-point Swing Through Gait    Crutch walking seems simple when watching someone else do it. However, if not done correctly, it can be very difficult and dangerous. Here are some tips and reminders that will be helpful to you at home. (Non-Weight bearing shown)    1. When coming to a standing position from a sitting position, remember:  a. Place both crutches in the hand opposite the side of your injury. b. Lean forward; push down on crutches and arm of chair. c. Stand up, straighten your good leg and get your balance. Once you are balanced, move crutches to proper place under arms. Get balanced again before attempting to walk. (Non-Weight bearing shown)     2. When Walking, Remember:  a. Place your crutches out approximately 10-12 inches in front of you with a wide enough stance for your hips to fit through. b. Push down on crutch hand rests - this is the only place where your weight is to be distributed.   c. Swing your \"good\" leg forward to meet up with the crutch location. Once you get the hang of it, you can swing your \"good\" leg approximately 10-12 inches past the crutch location, but in the beginning when you are just getting comfortable with crutch use, GO SLOW and TAKE YOUR TIME. d. The \"arm pit\" pads are NOT for putting your weight on. Rather, the pads are for squeezing between the inside of your arm and the chest wall to keep crutch from moving when you are walking. You should have a 2-3 finger-width space between the armpit and the pad. (Non-Weight bearing shown)    3. When sitting:  a. Back up to chair until the back of the uninvolved good leg touches the chair or seat.    b. Move both crutches to the \"injured leg\" side. c. Hold crutches at hand  area. d. Reach back with free hand for arm of chair, slide involved leg forward and lower yourself slowly onto seat. (Note: The pictures above show a non-weight bearing patient negotiating stairs and the injured leg away from load bearing. If you are allowed limited or full weight bearing on your surgical leg/foot, the surgical leg position should correspond to the location of the crutches on the stair step.)    4. When climbing stairs, remember: \"Up with the good; down with the bad. \"             a. This means when going up stairs, you: step your good leg up first, then the crutches and your bad leg follow. b. When going down stairs, the crutches and your bad leg step down first, followed by your good leg. Remember: Take it slowly! In the examples above you are shown non-weight bearing as this is the most difficult technique to master. However, partial weight bearing is also used depending on what your doctor prescribes after surgery. With partial weight bearin. The injured leg is allowed to carry a prescribed amount of your body weight.   2. Touch-down weight bearing - toes touch the ground with least amount of weight bearing  3. 25% - 100% weight bearing - allow for a quarter to all of your body weight to be carried by your injured and healing leg, depending on your doctors orders. 4. Typically, apply as much weight to your injured leg as you can tolerate. If there is pain, you may be doing too much weight bearing and you may need to slow your progress down. \"Let pain be your guide. \"      Most importantly, follow your doctor's instructions carefully. Doing too much too soon can possibly impede your healing progress and delay your recovery. Hints and Safety:   Never leave crutches behind and attempt to hop where you are going.  Always use good posture. Do not lean on arm pits, this can cause severe nerve damage in arms.  Inspect crutch tips periodically and replace as necessary.  Dont play on crutches.  Remove or set aside things on the floor that can trip someone on crutches, like throw rugs, loose wires or extension cords, clutter on the floor.  Use caution if you have slick, waxed or wet floors, small pets, uneven floors or deep carpet. Be careful, think, and take your time! []    Physical Therapy referral made before discharge for crutch training and evaluation. [facility] [address] [address]  OFFICE [phone]  CELL    [phone]    You have just had surgery on your foot and/or ankle. Proper care during the post-operative period is an integral part of your surgical treatment program.  It is imperative that these instructions are followed to insure proper healing and to obtain the best results. 1. GO directly home and keep your feet elevated on the way. 2. DRESSING OR CAST - Keep your dressing or cast clean and dry. DO NOT remove the bandage or inspect the wound. A small amount of blood on the bandage is normal.  If the dressing falls off or gets wet, call the office immediately.   3. ELEVATION - Place two pillows under the knee and leg. Make sure to support underneath your knees. You should elevate your leg whenever you are sitting or lying down. This includes mealtime and when retiring for the night. 4. ICE - Apply 1 or 2 small bags of ice close to, BUT NOT DIRECTLY OVER, the surgical site. The ice should be ON FOR TWENTY MINUTES, THEN OFF FOR ONE HALF HOUR. Continue this sequence throughout the day. Remember to keep the dressing or cast dry. Double-bagging the ice will help. 5. Limited pain and swelling is expected. 6. Exercise your legs frequently by bending your knees to stimulate circulation and speed healing. 7. You are to be:   · NON-WEIGHT BEARING - you are not allowed to touch your foot to the floor and/or ground. You must use crutches or a walker at all times. 8. If you have a surgical shoe - it should be worn whenever you are standing. 9. MEALS - Your first meal at home should be a light one such as toast or soup. If nausea and vomiting develop call the office immediately. Drink plenty of fluid and resume a well balanced diet. 10. Sponge baths are recommended until your first post-operative appointment. 11. PRESCRIPTIONS - Please fill and take as directed. If you have any difficulties or experience any side effects after taking this medication please discontinue and call the office and/or go to your closest Emergency Room immediately. Call our office to make your next appointment; Also, if you have any of the following:  · Temperature above 100.5 degrees  · Your bandage becomes overly stained, falls off or gets wet  · You bump or injure the surgical site  · Your medication does not stop discomfort    WE WANT YOUR SURGERY AND RECOVERY TO BE SUCCESSFUL AND AS COMFORTABLE AS POSSIBLE. THANK YOU FOR FOLLOWING THESE INSTRUCTIONS. IF YOU HAVE ANY PROBLEMS OR QUESTIONS PLEASE CALL OUR OFFICE.     DISCHARGE SUMMARY from your Nurse    The following personal items collected during your admission are returned to you:   Dental Appliance: Dental Appliances: None  Vision: Visual Aid: None  Hearing Aid:    Jewelry: Jewelry: None  Clothing: Clothing: Footwear, Pants, Shirt, Undergarments  Other Valuables: Other Valuables: None  Valuables sent to safe:      PATIENT INSTRUCTIONS:    After general anesthesia or intravenous sedation, for 24 hours or while taking prescription Narcotics:  · Limit your activities  · Do not drive and operate hazardous machinery  · Do not make important personal or business decisions  · Do  not drink alcoholic beverages  · If you have not urinated within 8 hours after discharge, please contact your surgeon on call. Report the following to your surgeon:  · Excessive pain, swelling, redness or odor of or around the surgical area  · Temperature over 100.5  · Nausea and vomiting lasting longer than 4 hours or if unable to take medications  · Any signs of decreased circulation or nerve impairment to extremity: change in color, persistent  numbness, tingling, coldness or increase pain  · Any questions    COUGH AND DEEP BREATHE    Breathing deep and coughing are very important exercises to do after surgery. Deep breathing and coughing open the little air tubes and air sacks in your lungs. You take deep breaths every day. You may not even notice - it is just something you do when you sigh or yawn. It is a natural exercise you do to keep these air passages open. After surgery, take deep breaths and cough, on purpose. Coughing and deep breathing help prevent bronchitis and pneumonia after surgery. If you had chest or belly surgery, use a pillow as a \"hug buddy\" and hold it tightly to your chest or belly when you cough. DIRECTIONS:  10. Take 10 to 15 slow deep breaths every hour while awake. 11. Breathe in deeply, and hold it for 2 seconds. 12. Exhale slowly through puckered lips, like blowing up a balloon.   13. After every 4th or 5th deep breath, hug your pillow to your chest or belly and give a hard, deep cough. Yes, it will probably hurt. But doing this exercise is very important part of healing after surgery. Take your pain medicine to help you do this exercise without too much pain. IF YOU HAVE BEEN DIAGNOSED WITH SLEEP APNEA, PLEASE USE YOUR SLEEP APNEA DEVICE OR CPAP MACHINE WHEN YOU INTEND TO NAP AFTER TAKING PAIN MEDICATION. Ankle Pumps    Ankle pumps increase the circulation of oxygenated blood to your lower extremities and decrease your risk for circulation problems such as blood clots. They also stretch the muscles, tendons and ligaments in your foot and ankle, and prevent joint contracture in the ankle and foot, especially after surgeries on the legs. It is important to do ankle pump exercises regularly after surgery because immobility increases your risk for developing a blood clot. Your doctor may also have you take an Aspirin for the next few days as well. If your doctor did not ask you to take an Aspirin, consult with him before starting Aspirin therapy on your own. Slowly point your foot forward, feeling the muscles on the top of your lower leg stretch, and hold this position for 5 seconds. Next, pull your foot back toward you as far as possible, stretching the calf muscles, and hold that position for 5 seconds. Repeat with the other foot. Perform 10 repetitions every hour while awake for both ankles if possible (down and then up with the foot once is one repetition). You should feel gentle stretching of the muscles in your lower leg when doing this exercise. If you feel pain, or your range of motion is limited, don't  Push too hard. Only go the limit your joint and muscles will let you go. If you have increasing pain, progressively worsening leg warmth or swelling, STOP the exercise and call your doctor.      Below is information about the medications your doctor is prescribing after your visit:    Other information in your discharge envelope:  []     PRESCRIPTIONS  []     PHYSICAL THERAPY PRESCRIPTION  []     APPOINTMENT CARDS  []     Regional Anesthesia Pamphlet for block or block with On-Q Catheter from Anesthesia Service  []     Medical device information sheets/pamphlets from their    []     School/work excuse note. []     /parent work excuse note. These are general instructions for a healthy lifestyle:    *  Please give a list of your current medications to your Primary Care Provider. *  Please update this list whenever your medications are discontinued, doses are      changed, or new medications (including over-the-counter products) are added. *  Please carry medication information at all times in case of emergency situations. About Smoking  No smoking / No tobacco products / Avoid exposure to second hand smoke    Surgeon General's Warning:  Quitting smoking now greatly reduces serious risk to your health. Obesity, smoking, and sedentary lifestyle greatly increases your risk for illness and disease. A healthy diet, regular physical exercise & weight monitoring are important for maintaining a healthy lifestyle. Congestive Heart Failure  You may be retaining fluid if you have a history of heart failure or if you experience any of the following symptoms:  Weight gain of 3 pounds or more overnight or 5 pounds in a week, increased swelling in our hands or feet or shortness of breath while lying flat in bed. Please call your doctor as soon as you notice any of these symptoms; do not wait until your next office visit. Recognize signs and symptoms of STROKE:  F - face looks uneven  A - arms unable to move or move even  S - speech slurred or non-existent  T - time-call 911 as soon as signs and symptoms begin-DO NOT go         Back to bed or wait to see if you get better-TIME IS BRAIN.       Warning signs of HEART ATTACK  Call 911 if you have these symptoms    · Chest discomfort. Most heart attacks involve discomfort in the center of the chest that lasts more than a few minutes, or that goes away and comes back. It can feel like uncomfortable pressure, squeezing, fullness, or pain. · Discomfort in other areas of the upper body. Symptoms can include pain or discomfort in one or both        Arms, the back, neck, jaw, or stomach. ·  Shortness of breath with or without chest discomfort. · Other signs may include breaking out in a cold sweat, nausea, or lightheadedness    Don't wait more than five minutes to call 911 - MINUTES MATTER! Fast action can save your life. Calling 911 is almost always the fastest way to get lifesaving treatment. Emergency Medical Services staff can begin treatment when they arrive - up to an hour sooner than if someone gets to the hospital by car. [facility] MEDICATION AND S[facility]IDE    The [employer] MEDICATION AND SIDE EFFECT GUIDE was provided to the PATIENT AND CARE PROVIDER.   Information provided includes instruction about drug purpose and common side effects for the following medications:    · norco

## 2018-09-19 NOTE — PERIOP NOTES
INSTRUCTIONS [address] MAIN OR [phone] MAIN PRE OP [phone] AMBULATORY PRE OP [phone] PRE-ADMISSION TESTING 21  Surgery Date:   Friday 10/5/18 Is surgery arrival time given by surgeon? NO If [facility] staff will call you between 3 and 7pm the day before your surgery with your arrival time. (If your surgery is on a Monday, we will call you the Friday before.) Call [phone] after 7pm Monday-Friday if you did not receive your arrival time. Answers to Common Questions When You 
Arrive Arrive at the [address] [facility] on the day of your surgery Have your insurance card, photo ID, and any copayment (if needed) Food 
 and  
Drink NO food or drink after midnight the night before surgery This means NO water, gum, mints, coffee, juice, etc. 
No alcohol (beer, wine, liquor) 24 hours before and after surgery Medicine to TAKE Morning of Surgery MEDICATIONS TO TAKE THE MORNING OF SURGERY WITH A SIP OF WATER:  
? Zyrtec, Omeprazole Medicine To 
STOP  
FOR PAIN 
? You can take Tylenol  follow instructions on the bottle 
? NO Aspirin for pain ? NO Non-Steroidal Anti-Inflammatory Drugs (NSAIDs:  
for example, Ibuprofen (Advil, Motrin), Naproxen (Aleve) ? STOP herbal supplements and vitamins 1 week before surgery Blood Thinners ? If you take Aspirin, Plavix, Coumadin, blood-thinning or anti-clot medicine, talk to your surgeon and/or follow the instructions from the doctor who told you to take that medicine Clothing Jewelry Valuables Bathing CLOTHING 
? Wear loose, comfortable clothes ? Wear glasses instead of contacts ? Leave money, jewelry and valuables at home ? REMOVE ALL piercings, rings, and jewelry - leave at home ? Wear hair loose or down; no pony-tails, buns, or metal hair clips BATHING 
 ? Follow all special bath instructions (for total joint replacement, spine and bowel surgeries.) ? If you shower the morning of surgery, please do not apply any lotions, powders, or deodorants afterwards. Do not shave or trim anywhere 24 hours before surgery. Going Home 
or Spending the Night  
? SAME-DAY SURGERY: You must have a responsible adult drive you home and stay with you 24 hours after surgery ? ADMITS: If your doctor is keeping you into the hospital after surgery, leave personal belongings/luggage in your car until you have a hospital room number. Hospital discharge time is 12 noon Drivers must be here before 12 noon unless you are told differently Follow all instructions so your surgery wont be cancelled. Please, be on time. If a situation occurs and you are delayed the day of surgery, call [phone] or [phone]. If your physical condition changes (like a fever, cold, flu, etc.) call your surgeon as soon as possible. The [facility] staff can be reached at [phone]. OTHER SPECIAL INSTRUCTIONS:  Free  parking 7a-5p. Bring current list of medication taking on day of surgery. Bring C-PAP machine on day of surgery The patient was contacted  via phone. He  verbalize  understanding of all instructions and does not  need reinforcement.

## 2018-10-04 ENCOUNTER — ANESTHESIA EVENT (OUTPATIENT)
Dept: SURGERY | Age: 48
End: 2018-10-04
Payer: COMMERCIAL

## 2018-10-05 ENCOUNTER — HOSPITAL ENCOUNTER (OUTPATIENT)
Age: 48
Setting detail: OUTPATIENT SURGERY
Discharge: HOME | End: 2018-10-05
Attending: PODIATRIST | Admitting: PODIATRIST
Payer: COMMERCIAL

## 2018-10-05 ENCOUNTER — ANESTHESIA (OUTPATIENT)
Dept: SURGERY | Age: 48
End: 2018-10-05
Payer: COMMERCIAL

## 2018-10-05 VITALS
WEIGHT: 207.23 LBS | TEMPERATURE: 97.8 F | OXYGEN SATURATION: 92 % | HEIGHT: 67 IN | BODY MASS INDEX: 32.53 KG/M2 | DIASTOLIC BLOOD PRESSURE: 73 MMHG | HEART RATE: 59 BPM | SYSTOLIC BLOOD PRESSURE: 112 MMHG | RESPIRATION RATE: 16 BRPM

## 2018-10-05 PROCEDURE — 76210000021 HC REC RM PH II 0.5 TO 1 HR: Performed by: PODIATRIST

## 2018-10-05 PROCEDURE — 74011250636 HC RX REV CODE- 250/636: Performed by: ANESTHESIOLOGY

## 2018-10-05 PROCEDURE — 76010000149 HC OR TIME 1 TO 1.5 HR: Performed by: PODIATRIST

## 2018-10-05 PROCEDURE — 77030006632 HC BLD ENDOSC DISP STRY -C: Performed by: PODIATRIST

## 2018-10-05 PROCEDURE — 77030002916 HC SUT ETHLN J&J -A: Performed by: PODIATRIST

## 2018-10-05 PROCEDURE — 74011000250 HC RX REV CODE- 250: Performed by: PODIATRIST

## 2018-10-05 PROCEDURE — 97161 PT EVAL LOW COMPLEX 20 MIN: CPT

## 2018-10-05 PROCEDURE — 77030020782 HC GWN BAIR PAWS FLX 3M -B

## 2018-10-05 PROCEDURE — 77030000032 HC CUF TRNQT ZIMM -B: Performed by: PODIATRIST

## 2018-10-05 PROCEDURE — 77030011640 HC PAD GRND REM COVD -A: Performed by: PODIATRIST

## 2018-10-05 PROCEDURE — 76060000033 HC ANESTHESIA 1 TO 1.5 HR: Performed by: PODIATRIST

## 2018-10-05 PROCEDURE — 97116 GAIT TRAINING THERAPY: CPT

## 2018-10-05 PROCEDURE — 74011250636 HC RX REV CODE- 250/636

## 2018-10-05 PROCEDURE — 77030036687 HC SHOE PSTOP S2SG -A

## 2018-10-05 PROCEDURE — 77030031139 HC SUT VCRL2 J&J -A: Performed by: PODIATRIST

## 2018-10-05 PROCEDURE — 77030018836 HC SOL IRR NACL ICUM -A: Performed by: PODIATRIST

## 2018-10-05 PROCEDURE — 74011250636 HC RX REV CODE- 250/636: Performed by: PODIATRIST

## 2018-10-05 PROCEDURE — 77030022323 HC BLD RASP RECIP BRSM -B: Performed by: PODIATRIST

## 2018-10-05 RX ORDER — SODIUM CHLORIDE 0.9 % (FLUSH) 0.9 %
5-10 SYRINGE (ML) INJECTION EVERY 8 HOURS
Status: DISCONTINUED | OUTPATIENT
Start: 2018-10-05 | End: 2018-10-05 | Stop reason: HOSPADM

## 2018-10-05 RX ORDER — SODIUM CHLORIDE 0.9 % (FLUSH) 0.9 %
5-10 SYRINGE (ML) INJECTION AS NEEDED
Status: DISCONTINUED | OUTPATIENT
Start: 2018-10-05 | End: 2018-10-05 | Stop reason: HOSPADM

## 2018-10-05 RX ORDER — MIDAZOLAM HYDROCHLORIDE 1 MG/ML
INJECTION, SOLUTION INTRAMUSCULAR; INTRAVENOUS AS NEEDED
Status: DISCONTINUED | OUTPATIENT
Start: 2018-10-05 | End: 2018-10-05 | Stop reason: HOSPADM

## 2018-10-05 RX ORDER — ONDANSETRON 2 MG/ML
INJECTION INTRAMUSCULAR; INTRAVENOUS AS NEEDED
Status: DISCONTINUED | OUTPATIENT
Start: 2018-10-05 | End: 2018-10-05 | Stop reason: HOSPADM

## 2018-10-05 RX ORDER — SODIUM CHLORIDE, SODIUM LACTATE, POTASSIUM CHLORIDE, CALCIUM CHLORIDE 600; 310; 30; 20 MG/100ML; MG/100ML; MG/100ML; MG/100ML
125 INJECTION, SOLUTION INTRAVENOUS CONTINUOUS
Status: DISCONTINUED | OUTPATIENT
Start: 2018-10-05 | End: 2018-10-05 | Stop reason: HOSPADM

## 2018-10-05 RX ORDER — PROPOFOL 10 MG/ML
INJECTION, EMULSION INTRAVENOUS
Status: DISCONTINUED | OUTPATIENT
Start: 2018-10-05 | End: 2018-10-05 | Stop reason: HOSPADM

## 2018-10-05 RX ORDER — HYDROMORPHONE HYDROCHLORIDE 2 MG/ML
.25-1 INJECTION, SOLUTION INTRAMUSCULAR; INTRAVENOUS; SUBCUTANEOUS
Status: DISCONTINUED | OUTPATIENT
Start: 2018-10-05 | End: 2018-10-05 | Stop reason: HOSPADM

## 2018-10-05 RX ORDER — FLUMAZENIL 0.1 MG/ML
0.2 INJECTION INTRAVENOUS
Status: DISCONTINUED | OUTPATIENT
Start: 2018-10-05 | End: 2018-10-05 | Stop reason: HOSPADM

## 2018-10-05 RX ORDER — DIPHENHYDRAMINE HYDROCHLORIDE 50 MG/ML
12.5 INJECTION, SOLUTION INTRAMUSCULAR; INTRAVENOUS AS NEEDED
Status: DISCONTINUED | OUTPATIENT
Start: 2018-10-05 | End: 2018-10-05 | Stop reason: HOSPADM

## 2018-10-05 RX ORDER — FENTANYL CITRATE 50 UG/ML
INJECTION, SOLUTION INTRAMUSCULAR; INTRAVENOUS AS NEEDED
Status: DISCONTINUED | OUTPATIENT
Start: 2018-10-05 | End: 2018-10-05 | Stop reason: HOSPADM

## 2018-10-05 RX ORDER — PROPOFOL 10 MG/ML
INJECTION, EMULSION INTRAVENOUS AS NEEDED
Status: DISCONTINUED | OUTPATIENT
Start: 2018-10-05 | End: 2018-10-05 | Stop reason: HOSPADM

## 2018-10-05 RX ORDER — LIDOCAINE HYDROCHLORIDE 20 MG/ML
INJECTION, SOLUTION INFILTRATION; PERINEURAL AS NEEDED
Status: DISCONTINUED | OUTPATIENT
Start: 2018-10-05 | End: 2018-10-05 | Stop reason: HOSPADM

## 2018-10-05 RX ORDER — LIDOCAINE HYDROCHLORIDE 10 MG/ML
0.1 INJECTION, SOLUTION EPIDURAL; INFILTRATION; INTRACAUDAL; PERINEURAL AS NEEDED
Status: DISCONTINUED | OUTPATIENT
Start: 2018-10-05 | End: 2018-10-05 | Stop reason: HOSPADM

## 2018-10-05 RX ORDER — CLINDAMYCIN PHOSPHATE 900 MG/50ML
900 INJECTION, SOLUTION INTRAVENOUS ONCE
Status: COMPLETED | OUTPATIENT
Start: 2018-10-05 | End: 2018-10-05

## 2018-10-05 RX ORDER — BUPIVACAINE HYDROCHLORIDE 5 MG/ML
INJECTION, SOLUTION EPIDURAL; INTRACAUDAL AS NEEDED
Status: DISCONTINUED | OUTPATIENT
Start: 2018-10-05 | End: 2018-10-05 | Stop reason: HOSPADM

## 2018-10-05 RX ORDER — NALOXONE HYDROCHLORIDE 0.4 MG/ML
0.04 INJECTION, SOLUTION INTRAMUSCULAR; INTRAVENOUS; SUBCUTANEOUS
Status: DISCONTINUED | OUTPATIENT
Start: 2018-10-05 | End: 2018-10-05 | Stop reason: HOSPADM

## 2018-10-05 RX ADMIN — PROPOFOL 50 MCG/KG/MIN: 10 INJECTION, EMULSION INTRAVENOUS at 07:35

## 2018-10-05 RX ADMIN — SODIUM CHLORIDE, SODIUM LACTATE, POTASSIUM CHLORIDE, AND CALCIUM CHLORIDE 125 ML/HR: 600; 310; 30; 20 INJECTION, SOLUTION INTRAVENOUS at 06:55

## 2018-10-05 RX ADMIN — ONDANSETRON 4 MG: 2 INJECTION INTRAMUSCULAR; INTRAVENOUS at 07:39

## 2018-10-05 RX ADMIN — CLINDAMYCIN PHOSPHATE 900 MG: 900 INJECTION, SOLUTION INTRAVENOUS at 07:33

## 2018-10-05 RX ADMIN — MIDAZOLAM HYDROCHLORIDE 3 MG: 1 INJECTION, SOLUTION INTRAMUSCULAR; INTRAVENOUS at 07:28

## 2018-10-05 RX ADMIN — LIDOCAINE HYDROCHLORIDE 50 MG: 20 INJECTION, SOLUTION INFILTRATION; PERINEURAL at 07:35

## 2018-10-05 RX ADMIN — FENTANYL CITRATE 50 MCG: 50 INJECTION, SOLUTION INTRAMUSCULAR; INTRAVENOUS at 07:33

## 2018-10-05 RX ADMIN — PROPOFOL 50 MG: 10 INJECTION, EMULSION INTRAVENOUS at 07:58

## 2018-10-05 RX ADMIN — PROPOFOL 20 MG: 10 INJECTION, EMULSION INTRAVENOUS at 07:37

## 2018-10-05 RX ADMIN — FENTANYL CITRATE 50 MCG: 50 INJECTION, SOLUTION INTRAMUSCULAR; INTRAVENOUS at 07:56

## 2018-10-05 RX ADMIN — FENTANYL CITRATE 50 MCG: 50 INJECTION, SOLUTION INTRAMUSCULAR; INTRAVENOUS at 07:30

## 2018-10-05 RX ADMIN — MIDAZOLAM HYDROCHLORIDE 2 MG: 1 INJECTION, SOLUTION INTRAMUSCULAR; INTRAVENOUS at 07:35

## 2018-10-05 RX ADMIN — FENTANYL CITRATE 50 MCG: 50 INJECTION, SOLUTION INTRAMUSCULAR; INTRAVENOUS at 07:58

## 2018-10-05 NOTE — PERIOP NOTES
Dr North Dates here to see pt, orders received for post op shoe, crutch teaching and Rx received. 36 - Post op shoe in place, awaiting PT for evaluation and treatment. Crutches sized for pt, ice bags given, wife states \"we've both been through this before so we know what to do\"

## 2018-10-05 NOTE — DISCHARGE INSTRUCTIONS
DISCHARGE SUMMARY from your Nurse    The following personal items collected during your admission are returned to you:   Dental Appliance: Dental Appliances: None  Vision: Visual Aid: Glasses  Hearing Aid:    Jewelry: Jewelry: None  Clothing: Clothing:  (Street clothing to locker)  Other Valuables: Other Valuables: None  Valuables sent to safe:      PATIENT INSTRUCTIONS:    After general anesthesia or intravenous sedation, for 24 hours or while taking prescription Narcotics:  · Limit your activities  · Do not drive and operate hazardous machinery  · Do not make important personal or business decisions  · Do  not drink alcoholic beverages  · If you have not urinated within 8 hours after discharge, please contact your surgeon on call. Report the following to your surgeon:  · Excessive pain, swelling, redness or odor of or around the surgical area  · Temperature over 100.5  · Nausea and vomiting lasting longer than 4 hours or if unable to take medications  · Any signs of decreased circulation or nerve impairment to extremity: change in color, persistent  numbness, tingling, coldness or increase pain  · Any questions    COUGH AND DEEP BREATHE    Breathing deep and coughing are very important exercises to do after surgery. Deep breathing and coughing open the little air tubes and air sacks in your lungs. You take deep breaths every day. You may not even notice - it is just something you do when you sigh or yawn. It is a natural exercise you do to keep these air passages open. After surgery, take deep breaths and cough, on purpose. Coughing and deep breathing help prevent bronchitis and pneumonia after surgery. If you had chest or belly surgery, use a pillow as a \"hug buddy\" and hold it tightly to your chest or belly when you cough. DIRECTIONS:  6. Take 10 to 15 slow deep breaths every hour while awake. 7. Breathe in deeply, and hold it for 2 seconds.   8. Exhale slowly through puckered lips, like blowing up a balloon. 9. After every 4th or 5th deep breath, hug your pillow to your chest or belly and give a hard, deep cough. Yes, it will probably hurt. But doing this exercise is very important part of healing after surgery. Take your pain medicine to help you do this exercise without too much pain. IF YOU HAVE BEEN DIAGNOSED WITH SLEEP APNEA, PLEASE USE YOUR SLEEP APNEA DEVICE OR CPAP MACHINE WHEN YOU INTEND TO NAP AFTER TAKING PAIN MEDICATION. Ankle Pumps    Ankle pumps increase the circulation of oxygenated blood to your lower extremities and decrease your risk for circulation problems such as blood clots. They also stretch the muscles, tendons and ligaments in your foot and ankle, and prevent joint contracture in the ankle and foot, especially after surgeries on the legs. It is important to do ankle pump exercises regularly after surgery because immobility increases your risk for developing a blood clot. Your doctor may also have you take an Aspirin for the next few days as well. If your doctor did not ask you to take an Aspirin, consult with him before starting Aspirin therapy on your own. Slowly point your foot forward, feeling the muscles on the top of your lower leg stretch, and hold this position for 5 seconds. Next, pull your foot back toward you as far as possible, stretching the calf muscles, and hold that position for 5 seconds. Repeat with the other foot. Perform 10 repetitions every hour while awake for both ankles if possible (down and then up with the foot once is one repetition). You should feel gentle stretching of the muscles in your lower leg when doing this exercise. If you feel pain, or your range of motion is limited, don't  Push too hard. Only go the limit your joint and muscles will let you go. If you have increasing pain, progressively worsening leg warmth or swelling, STOP the exercise and call your doctor.      Below is information about the medications your doctor is prescribing after your visit:    Other information in your discharge envelope:  []     PRESCRIPTIONS  []     PHYSICAL THERAPY PRESCRIPTION  []     APPOINTMENT CARDS  []     Regional Anesthesia Pamphlet for block or block with On-Q Catheter from Anesthesia Service  []     Medical device information sheets/pamphlets from their    []     School/work excuse note. []     /parent work excuse note. These are general instructions for a healthy lifestyle:    *  Please give a list of your current medications to your Primary Care Provider. *  Please update this list whenever your medications are discontinued, doses are      changed, or new medications (including over-the-counter products) are added. *  Please carry medication information at all times in case of emergency situations. About Smoking  No smoking / No tobacco products / Avoid exposure to second hand smoke    Surgeon General's Warning:  Quitting smoking now greatly reduces serious risk to your health. Obesity, smoking, and sedentary lifestyle greatly increases your risk for illness and disease. A healthy diet, regular physical exercise & weight monitoring are important for maintaining a healthy lifestyle. Congestive Heart Failure  You may be retaining fluid if you have a history of heart failure or if you experience any of the following symptoms:  Weight gain of 3 pounds or more overnight or 5 pounds in a week, increased swelling in our hands or feet or shortness of breath while lying flat in bed. Please call your doctor as soon as you notice any of these symptoms; do not wait until your next office visit.     Recognize signs and symptoms of STROKE:  F - face looks uneven  A - arms unable to move or move even  S - speech slurred or non-existent  T - time-call 911 as soon as signs and symptoms begin-DO NOT go         Back to bed or wait to see if you get better-TIME IS BRAIN. Warning signs of HEART ATTACK  Call 911 if you have these symptoms    · Chest discomfort. Most heart attacks involve discomfort in the center of the chest that lasts more than a few minutes, or that goes away and comes back. It can feel like uncomfortable pressure, squeezing, fullness, or pain. · Discomfort in other areas of the upper body. Symptoms can include pain or discomfort in one or both        Arms, the back, neck, jaw, or stomach. ·  Shortness of breath with or without chest discomfort. · Other signs may include breaking out in a cold sweat, nausea, or lightheadedness    Don't wait more than five minutes to call 911 - MINUTES MATTER! Fast action can save your life. Calling 911 is almost always the fastest way to get lifesaving treatment. Emergency Medical Services staff can begin treatment when they arrive - up to an hour sooner than if someone gets to the hospital by car. MAUREEN [facility] MEDICATION AND SIDE EFFECT GUIDE    [facility] MEDICATION AND SIDE EFFECT GUIDE was provided to the PATIENT AND CARE PROVIDER. Information provided includes instruction about drug purpose and common side effects for the following medications:    · Vicodin  · Motrin        [facility] [address] [address]  OFFICE [phone]  CELL    [phone]    You have just had surgery on your foot and/or ankle. Proper care during the post-operative period is an integral part of your surgical treatment program.  It is imperative that these instructions are followed to insure proper healing and to obtain the best results. 6. GO directly home and keep your feet elevated on the way. 7. DRESSING OR CAST - Keep your dressing or cast clean and dry. DO NOT remove the bandage or inspect the wound.   A small amount of blood on the bandage is normal.  If the dressing falls off or gets wet, call the office immediately. 8. ELEVATION - Place two pillows under the knee and leg. Make sure to support underneath your knees. You should elevate your leg whenever you are sitting or lying down. This includes mealtime and when retiring for the night. 9. ICE - Apply 1 or 2 small bags of ice close to, BUT NOT DIRECTLY OVER, the surgical site. The ice should be ON FOR TWENTY MINUTES, THEN OFF FOR ONE HALF HOUR. Continue this sequence throughout the day. Remember to keep the dressing or cast dry. Double-bagging the ice will help. 10. Limited pain and swelling is expected. 11. Exercise your legs frequently by bending your knees to stimulate circulation and speed healing. 12. You are to be:   · NON-WEIGHT BEARING - you are not allowed to touch your foot to the floor and/or ground. You must use crutches or a walker at all times. 13. If you have a surgical shoe - it should be worn whenever you are standing or walking. 14. MEALS - Your first meal at home should be a light one such as toast or soup. If nausea and vomiting develop call the office immediately. Drink plenty of fluid and resume a well balanced diet. 15. Sponge baths are recommended until your first post-operative appointment. 16. PRESCRIPTIONS - Please fill and take as directed. If you have any difficulties or experience any side effects after taking this medication please discontinue and call the office and/or go to your closest Emergency Room immediately. Call our office to make your next appointment; Also, if you have any of the following:  · Temperature above 100.5 degrees  · Your bandage becomes overly stained, falls off or gets wet  · You bump or injure the surgical site  · Your medication does not stop discomfort    WE WANT YOUR SURGERY AND RECOVERY TO BE SUCCESSFUL AND AS COMFORTABLE AS POSSIBLE. THANK YOU FOR FOLLOWING THESE INSTRUCTIONS. IF YOU HAVE ANY PROBLEMS OR QUESTIONS PLEASE CALL OUR OFFICE.

## 2018-10-05 NOTE — PROGRESS NOTES
physical Therapy EVALUATION 
(Ambulatory surgery, emergency room & recovery room patients) Patient: Maxime Prasad (13 y.o. male) Date: 10/5/2018 Primary Diagnosis and Medical History: LEFT FOOT PAINFUL PLANTAR FASCIITIS Procedure(s) (LRB): LEFT FOOT ENDOSCOPIC PLANTAR FASCIOTOMY  (EIP 7:30) (Left) Day of Surgery Past Medical History:  
Diagnosis Date  Adverse effect of anesthesia \"difficulty waking up\"  Broken ribs 2017  Diverticula of colon  GERD (gastroesophageal reflux disease)   
 occasional  
 Hypercholesteremia  Microscopic hematuria   
 Skin cancer 05/2018  
 nose/jaw  Sleep apnea   
 uses CPAP Past Surgical History:  
Procedure Laterality Date  ABDOMEN SURGERY PROC UNLISTED  06/2016  
 bowel resec with illeostomy  ABDOMEN SURGERY PROC UNLISTED  09/2016  
 reversal of ileostomy  ABDOMEN SURGERY PROC UNLISTED  2017  
 repair of hernia r/t ileostomy reversal  
 HX ACL RECONSTRUCTION  1990  
 screw removal  
 HX APPENDECTOMY  1992  HX COLONOSCOPY  2015  HX HEENT  2008  
 [facility]  
 left ear surgery - cholestoma  HX HEENT  Sep 2010  
 left ear TM graft revision [facility]  Aug 1998  
 left ear tube placement  HX HEENT  2011  
 left ear drum replacement  HX ORTHOPAEDIC    
 left knee A/S x 3  
 HX ORTHOPAEDIC  1980's  
 left knee ACL repair  HX ORTHOPAEDIC  2000  
 reshape and removal scar tissue  HX ORTHOPAEDIC  09/2017  
 r foot surgery  HX OTHER SURGICAL  05/2018 Skin can cer removed from nose/jaw Patient Active Problem List  
Diagnosis Code  Hyperlipidemia E78.5  GERD (gastroesophageal reflux disease) K21.9  Acute hypoxemic respiratory failure (HCC) J96.01  
 Sleep apnea G47.30  Hypercholesteremia E78.00  Rib fractures S22.39XA  Acute chest wall pain R07.89  
 Cough R05  
 SOB (shortness of breath) R06.02  Therapeutic opioid induced constipation K59.03, T40.2X5A  
 
 Prior Level of Function/Home Situation: Independent community ambulator without assistive device. Personal factors and/or comorbidities impacting plan of care:  
 
Home Situation Home Environment: Private residence # Steps to Enter: 3 Living Alone: No 
Support Systems: Spouse/Significant Other/Partner Patient Expects to be Discharged to[de-identified] Private residence Ordered Weight Bearing Status:  left non-weight Equipment: crutches EXAMINATION/PRESENTATION/DECISION MAKING:  
Critical Behavior: 
Neurologic State: Drowsy Orientation Level: Oriented X4 Transfers: 
Overall level of assistance required following instruction: modified independence given verbal using axillary crutches. Ambulation: 
Weight bearing status during ambulation:      
  
  
  
  
Stair Management: 
  
  
  
Strength/ROM Limitations: 
WDL ? Mobility - Walking and Moving Around:  
  - CURRENT STATUS: CI - 1%-19% impaired, limited or restricted  - GOAL STATUS: CI - 1%-19% impaired, limited or restricted  - D/C STATUS:  CI - 1%-19% impaired, limited or restricted Pain: 
Pain Scale 1: Visual 
Pain Intensity 1: 0 Pain Location 1: Foot Education: 
Role of P.T. explained to the patient:  [x]  Yes              []   No 
 
 
 Topics addressed: Comments:  
[x]                                    Device use and technique [x]                                    Transfer technique [x]                                    Gait training   
[x]                                    Stair training Reviewed and Demonstrated to patient and spouse how to go up and down stairs. Patient and spouse stated no need to practice they know how to go up and downs stair from previous surgeries and has been doing [id number] with stairs. Patient is discharged from physical therapy at this time. Thena Cowden, PT,WCC. Time Calculation: 25 mins

## 2018-10-05 NOTE — ANESTHESIA PREPROCEDURE EVALUATION
Anesthetic History No history of anesthetic complications Review of Systems / Medical History Patient summary reviewed and pertinent labs reviewed Pulmonary Sleep apnea: CPAP Smoker Comments: Seasonal allergies Neuro/Psych Within defined limits Cardiovascular Hyperlipidemia Exercise tolerance: >4 METS 
  
GI/Hepatic/Renal 
  
GERD: well controlled Endo/Other Within defined limits Other Findings Physical Exam 
 
Airway Mallampati: III 
TM Distance: 4 - 6 cm Neck ROM: normal range of motion Mouth opening: Normal 
 
 Cardiovascular Rhythm: regular Rate: normal 
 
 
 
 Dental 
 
Dentition: Upper dentition intact and Lower dentition intact Pulmonary Breath sounds clear to auscultation Abdominal 
 
 
 
 Other Findings Anesthetic Plan ASA: 2 Anesthesia type: MAC Induction: Intravenous Anesthetic plan and risks discussed with: Patient

## 2018-10-05 NOTE — OP NOTES
[address] [facility] REPORT    Fay Stone  MR#: [id number]  : 1970  ACCOUNT #: [de-identified]   DATE OF SERVICE: 10/05/2018    SURGEON:  Lenore Mendoza DPM    ASSISTANT:  None. PREOPERATIVE DIAGNOSES:    1. Left foot plantar fasciitis. 2.  Left foot plantar calcaneal spur. POSTOPERATIVE DIAGNOSES:  1. Left foot plantar fasciitis. 2.  Left foot plantar calcaneal spur. PROCEDURES PERFORMED:  1. Left foot endoscopic plantar fasciotomy. 2.  Left foot plantar heel spur resection. PATHOLOGY SENT:  None. ANESTHESIA:  MAC with 10 mL of 1:1 mixture of 1% lidocaine plain and 0.5% Marcaine plain. HEMOSTASIS:  Achieved with a pneumatic thigh tourniquet at 300 mmHg for 48 minutes. ESTIMATED BLOOD LOSS:  Minimal.    MATERIALS USED:  3-0 Vicryl, 4-0 nylon. INJECTABLES:  10 mL 0.5% Marcaine plain. COMPLICATIONS:  None. CONDITION:  Stable. IMPLANTS:  None. SPECIMENS REMOVED:  None. INDICATION FOR PROCEDURE:  The patient is a 57-year-old male who presented to preoperative holding area for left heel pain. Reports post-static dyskinesia with heel pain. He has tried conservative treatment, which include stretching, changing shoes and orthotics which did not improve his pain. He would like to request surgical intervention to improve his pain. Upon review of x-ray, there is a plantar heel spur noted. PROCEDURE IN DETAIL:  The patient was identified in the preoperative holding area, cleared by nursing and anesthesia. Site and was marked. Consent was signed. All risks, complications, and benefits were explained. The procedure was explained in detail along with postoperative course. The patient was then taken back to the operating room and sedated. Attention was directed to the left lower extremity where the left foot was sterilely prepped and draped.   The left foot was injected with 10 mL 1:1 mixture of 1% lidocaine plain and 0.5% Marcaine plain. Timeout was performed. An Esmarch was used to exsanguinate the left lower extremity. The tourniquet was inflated. Skin marker was used to draw an incision line over the left medial heel. A 15 blade was used to make the incision. Blunt dissection was achieved with a hemostat. A fascial elevator was placed into the medial aspect of the heel and both lateral and plantar aspect of the heel. Plantar puckering was noted. Fascial layers were removed and replaced with a trocar and cannula. The trocar and cannula were removed laterally on the plantar aspect of the foot, plantar puckering was noted and the lateral aspect of the heel was tenting. A new 15 blade was used to make the lateral incision at the site of the tenting. The trocar and cannula removed laterally out of the lateral portal.  Trocar was removed. Cannula was cleansed with sterile Q-tips and a camera was placed into the lateral portal.  Good visualization of the plantar fascia was achieved. A triangle blade was placed into the medial portal and the medial 2/3 of the plantar fascia was transected. Multiple pictures were taken during the procedure. The area was flushed and all instruments were removed from the left foot. At this time, the medial incision was slightly elongated and a Vale elevator was used to identify the plantar calcaneal spur. A hand rasp was used to resect the plantar calcaneal spur. Procedure was done under fluoroscopy. The area was flushed and closed with 3-0 Vicryl and 4-0 nylon. Surgical site was injected with 10 mL of 0.5% Marcaine plain. Surgical site was dressed with Betadine-soaked Adaptic, dry sterile dressing and Coban for compression.   The patient tolerated the procedure well and was transferred to the PACU with stable vitals and neurovascular status intact, was given appropriate postoperative instructions to remain nonweightbearing to the left foot, was given hydrocodone and ibuprofen for postoperative pain control and given crutches. He is to follow up with Dr. Kacey Muhammad in 1 week.       ASHLY Soliman / [place]  D: 10/05/2018 11:18     T: 10/05/2018 14:46  JOB #: [id number]

## 2018-10-05 NOTE — IP AVS SNAPSHOT
[facility] 
 
 
 [address] [facility] 
[phone] Patient: Mavis Johnson MRN: [id number] HPN:4/7/[birth year]73 About your hospitalization You were admitted on:  October 5, 2018 You last received care in the:  [facility] PACU You were discharged on:  October 5, 2018 Why you were hospitalized Your primary diagnosis was:  Not on File Follow-up Information Follow up With Details Comments Contact Info Liath Gould DPM In 1 week  [address] [facility] 
[phone] Discharge Orders None A check john paul indicates which time of day the medication should be taken. My Medications ASK your doctor about these medications Instructions Each Dose to Equal  
 Morning Noon Evening Bedtime  
 cetirizine 10 mg tablet Commonly known as:  ZYRTEC Your last dose was: Your next dose is: Take 10 mg by mouth daily. In the morning. 10 mg  
    
   
   
   
  
 cholecalciferol (vitamin D3) 2,000 unit Tab Your last dose was: Your next dose is: Take 4,000 Units by mouth daily. In the morning. 4000 Units  
    
   
   
   
  
 fenofibrate 160 mg tablet Commonly known as:  LOFIBRA Your last dose was: Your next dose is: Take 160 mg by mouth daily. In the morning. 160 mg  
    
   
   
   
  
 OMEGA 3 PO Your last dose was: Your next dose is: Take 2,000 mg by mouth daily. 2000 mg  
    
   
   
   
  
 omeprazole 20 mg capsule Commonly known as:  PRILOSEC Your last dose was: Your next dose is: Take 20 mg by mouth every other day. In the morning. 20 mg  
    
   
   
   
  
 rosuvastatin 10 mg tablet Commonly known as:  CRESTOR Your last dose was: Your next dose is: Take 10 mg by mouth daily. In the morning.   
 10 mg  
    
   
   
 Discharge Instructions DISCHARGE SUMMARY from your Nurse The following personal items collected during your admission are returned to you:  
Dental Appliance: Dental Appliances: None Vision: Visual Aid: Glasses Hearing Aid:   
Jewelry: Jewelry: None Clothing: Clothing:  (Street clothing to locker) Other Valuables: Other Valuables: None Valuables sent to safe:   
 
PATIENT INSTRUCTIONS: 
 
After general anesthesia or intravenous sedation, for 24 hours or while taking prescription Narcotics: · Limit your activities · Do not drive and operate hazardous machinery · Do not make important personal or business decisions · Do  not drink alcoholic beverages · If you have not urinated within 8 hours after discharge, please contact your surgeon on call. Report the following to your surgeon: 
· Excessive pain, swelling, redness or odor of or around the surgical area · Temperature over 100.5 · Nausea and vomiting lasting longer than 4 hours or if unable to take medications · Any signs of decreased circulation or nerve impairment to extremity: change in color, persistent  numbness, tingling, coldness or increase pain · Any questions [address] Breathing deep and coughing are very important exercises to do after surgery. Deep breathing and coughing open the little air tubes and air sacks in your lungs. You take deep breaths every day. You may not even notice - it is just something you do when you sigh or yawn. It is a natural exercise you do to keep these air passages open. After surgery, take deep breaths and cough, on purpose. Coughing and deep breathing help prevent bronchitis and pneumonia after surgery. If you had chest or belly surgery, use a pillow as a \"hug buddy\" and hold it tightly to your chest or belly when you cough. DIRECTIONS: 
6. Take 10 to 15 slow deep breaths every hour while awake. 7. Breathe in deeply, and hold it for 2 seconds. 8. Exhale slowly through puckered lips, like blowing up a balloon. 9. After every 4th or 5th deep breath, hug your pillow to your chest or belly and give a hard, deep cough. Yes, it will probably hurt. But doing this exercise is very important part of healing after surgery. Take your pain medicine to help you do this exercise without too much pain. IF YOU HAVE BEEN DIAGNOSED WITH SLEEP APNEA, PLEASE USE YOUR SLEEP APNEA DEVICE OR CPAP MACHINE WHEN YOU INTEND TO NAP AFTER TAKING PAIN MEDICATION. Ankle Pumps Ankle pumps increase the circulation of oxygenated blood to your lower extremities and decrease your risk for circulation problems such as blood clots. They also stretch the muscles, tendons and ligaments in your foot and ankle, and prevent joint contracture in the ankle and foot, especially after surgeries on the legs. It is important to do ankle pump exercises regularly after surgery because immobility increases your risk for developing a blood clot. Your doctor may also have you take an Aspirin for the next few days as well. If your doctor did not ask you to take an Aspirin, consult with him before starting Aspirin therapy on your own. Slowly point your foot forward, feeling the muscles on the top of your lower leg stretch, and hold this position for 5 seconds. Next, pull your foot back toward you as far as possible, stretching the calf muscles, and hold that position for 5 seconds. Repeat with the other foot. Perform 10 repetitions every hour while awake for both ankles if possible (down and then up with the foot once is one repetition). You should feel gentle stretching of the muscles in your lower leg when doing this exercise. If you feel pain, or your range of motion is limited, don't  Push too hard. Only go the limit your joint and muscles will let you go.   If you have increasing pain, progressively worsening leg warmth or swelling, STOP the exercise and call your doctor. Below is information about the medications your doctor is prescribing after your visit: 
 
Other information in your discharge envelope: 
[]     PRESCRIPTIONS []     PHYSICAL THERAPY PRESCRIPTION 
[]     APPOINTMENT CARDS []     Regional Anesthesia Pamphlet for block or block with On-Q Catheter from Anesthesia Service []     Medical device information sheets/pamphlets from their   
[]     School/work excuse note. []     /parent work excuse note. These are general instructions for a healthy lifestyle: *  Please give a list of your current medications to your Primary Care Provider. *  Please update this list whenever your medications are discontinued, doses are 
    changed, or new medications (including over-the-counter products) are added. *  Please carry medication information at all times in case of emergency situations. About Smoking No smoking / No tobacco products / Avoid exposure to second hand smoke Surgeon General's Warning:  Quitting smoking now greatly reduces serious risk to your health. Obesity, smoking, and sedentary lifestyle greatly increases your risk for illness and disease. A healthy diet, regular physical exercise & weight monitoring are important for maintaining a healthy lifestyle. Congestive Heart Failure You may be retaining fluid if you have a history of heart failure or if you experience any of the following symptoms:  Weight gain of 3 pounds or more overnight or 5 pounds in a week, increased swelling in our hands or feet or shortness of breath while lying flat in bed. Please call your doctor as soon as you notice any of these symptoms; do not wait until your next office visit. Recognize signs and symptoms of STROKE: 
F - face looks uneven A - arms unable to move or move even S - speech slurred or non-existent T - time-call 911 as soon as signs and symptoms begin-DO NOT go  
 Back to bed or wait to see if you get better-TIME IS BRAIN. Warning signs of HEART ATTACK Call 911 if you have these symptoms · Chest discomfort. Most heart attacks involve discomfort in the center of the chest that lasts more than a few minutes, or that goes away and comes back. It can feel like uncomfortable pressure, squeezing, fullness, or pain. · Discomfort in other areas of the upper body. Symptoms can include pain or discomfort in one or both Arms, the back, neck, jaw, or stomach. ·  Shortness of breath with or without chest discomfort. · Other signs may include breaking out in a cold sweat, nausea, or lightheadedness Don't wait more than five minutes to call 911 - MINUTES MATTER! Fast action can save your life. Calling 911 is almost always the fastest way to get lifesaving treatment. Emergency Medical Services staff can begin treatment when they arrive - up to an hour sooner than if someone gets to the hospital by car. [facility] MED[facility] AND [facility] The [facility] EFFECT GUIDE was provided to the PATIENT AND CARE PROVIDER. Information provided includes instruction about drug purpose and common side effects for the following medications: ·  
 
 
 
[facility] Podiatric Medicine - Foot Surgery [address] [place] [address]. [address] OFFICE [phone] CELL    [phone] You have just had surgery on your foot and/or ankle. Proper care during the post-operative period is an integral part of your surgical treatment program.  It is imperative that these instructions are followed to insure proper healing and to obtain the best results. 6. GO directly home and keep your feet elevated on the way. 7. DRESSING OR CAST - Keep your dressing or cast clean and dry. DO NOT remove the bandage or inspect the wound.   A small amount of blood on the bandage is normal.  If the dressing falls off or gets wet, call the office immediately. 8. ELEVATION - Place two pillows under the knee and leg. Make sure to support underneath your knees. You should elevate your leg whenever you are sitting or lying down. This includes mealtime and when retiring for the night. 9. ICE - Apply 1 or 2 small bags of ice close to, BUT NOT DIRECTLY OVER, the surgical site. The ice should be ON FOR TWENTY MINUTES, THEN OFF FOR ONE HALF HOUR. Continue this sequence throughout the day. Remember to keep the dressing or cast dry. Double-bagging the ice will help. 10. Limited pain and swelling is expected. 11. Exercise your legs frequently by bending your knees to stimulate circulation and speed healing. 12. You are to be:  
· NON-WEIGHT BEARING - you are not allowed to touch your foot to the floor and/or ground. You must use crutches or a walker at all times. 13. If you have a surgical shoe - it should be worn whenever you are standing or walking. 14. MEALS - Your first meal at home should be a light one such as toast or soup. If nausea and vomiting develop call the office immediately. Drink plenty of fluid and resume a well balanced diet. 15. Sponge baths are recommended until your first post-operative appointment. 16. PRESCRIPTIONS - Please fill and take as directed. If you have any difficulties or experience any side effects after taking this medication please discontinue and call the office and/or go to your closest Emergency Room immediately. Call our office to make your next appointment; Also, if you have any of the following: · Temperature above 100.5 degrees · Your bandage becomes overly stained, falls off or gets wet · You bump or injure the surgical site · Your medication does not stop discomfort WE WANT YOUR SURGERY AND RECOVERY TO BE SUCCESSFUL AND AS COMFORTABLE AS POSSIBLE. THANK YOU FOR FOLLOWING THESE INSTRUCTIONS.   IF YOU HAVE ANY PROBLEMS OR QUESTIONS PLEASE CALL OUR OFFICE. Introducing [facility]! [employer] introduces [employer] patient portal. Now you can access parts of your medical record, email your doctor's office, and request medication refills online. 1. In your internet browser, go to [email or web address] 2. Click on the First Time User? Click Here link in the Sign In box. You will see the New Member Sign Up page. 3. Enter your [employer] Access Code exactly as it appears below. You will not need to use this code after youve completed the sign-up process. If you do not sign up before the expiration date, you must request a new code. · [employer] Access Code: [email or web address] Expires: 1/3/2019  8:55 AM 
 
4. Enter the last four digits of your Social Security Number (xxxx) and Date of Birth (mm/dd/yyyy) as indicated and click Submit. You will be taken to the next sign-up page. 5. Create a [employer] ID. This will be your [employer] login ID and cannot be changed, so think of one that is secure and easy to remember. 6. Create a [employer] password. You can change your password at any time. 7. Enter your Password Reset Question and Answer. This can be used at a later time if you forget your password. 8. Enter your e-mail address. You will receive e-mail notification when new information is available in [address] 9. Click Sign Up. You can now view and download portions of your medical record. 10. Click the Download Summary menu link to download a portable copy of your medical information. If you have questions, please visit the Frequently Asked Questions section of the [employer] website. Remember, [employer] is NOT to be used for urgent needs. For medical emergencies, dial 911. Now available from your iPhone and Android! Introducing León Guzman As a [employer] patient, I wanted to make you aware of our electronic visit tool called León Guzman. [employer] 24/7 allows you to connect within minutes with a medical provider 24 hours a day, seven days a week via a mobile device or tablet or logging into a secure website from your computer. You can access Social[employer] from anywhere in the [place]. A virtual visit might be right for you when you have a simple condition and feel like you just dont want to get out of bed, or cant get away from work for an appointment, when your regular [employer] provider is not available (evenings, weekends or holidays), or when youre out of town and need minor care. Electronic visits cost only $49 and if the [employer] 24/7 provider determines a prescription is needed to treat your condition, one can be electronically transmitted to a nearby pharmacy*. Please take a moment to enroll today if you have not already done so. The enrollment process is free and takes just a few minutes. To enroll, please download the [employer] 24/7 jd to your tablet or phone, or visit [email or web address] to enroll on your computer. And, as an [address] [place] [address] patient with a [employer] account, the results of your visits will be scanned into your electronic medical record and your primary care provider will be able to view the scanned results. We urge you to continue to see your regular [employer] provider for your ongoing medical care. And while your primary care provider may not be the one available when you seek a [employer] virtual visit, the peace of mind you get from getting a real diagnosis real time can be priceless. For more information on [employer], view our Frequently Asked Questions (FAQs) at [email or web address]. Sincerely, 
 
Nilda Gonzalez MD 
Chief Medical Officer [facility] *:  certain medications cannot be prescribed via Social[employer] Providers Seen During Your Hospitalization Provider Specialty Primary office phone Alise Cuellar, [address] [facility] [phone] Your Primary Care Physician (PCP) Primary Care Physician Office Phone Office Fax Josesito Ramirez [phone] You are allergic to the following Allergen Reactions Wellbutrin (Bupropion) Swelling Angioedema Adhesive Tape-Silicones Contact Dermatitis Pt reports he can tolerate paper tape. Azithromycin Hives Cephalexin Hives Pt has taken Augmentin before and tolerated it per rn Hydromorphone Itching Nausea Only Ketamine Hives Swelling Recent Documentation Height Weight BMI Smoking Status 1.702 m 94 kg 32.46 kg/m2 Current Every Day Smoker Emergency Contacts Name Discharge Info Relation Home Work Mobile [address] CAREGIVER [3] Spouse [3] [phone] Patient Belongings The following personal items are in your possession at time of discharge: 
  Dental Appliances: None  Visual Aid: Glasses   Hearing Aids/Status: Left  Home Medications: None   Jewelry: None  Clothing:  (Street clothing to locker)    Other Valuables: None Please provide this summary of care documentation to your next provider. Signatures-by signing, you are acknowledging that this After Visit Summary has been reviewed with you and you have received a copy. Patient Signature:  ____________________________________________________________ Date:  ____________________________________________________________  
  
Georgette Chen Provider Signature:  ____________________________________________________________ Date:  ____________________________________________________________

## 2018-10-05 NOTE — H&P
History and Physical 
 
Subjective: 
 
  
 
Patient is a 46 y.o.  male who is being seen for left heel pain. Workup has revealed Plantar fascitis and heel spur. Denies changes in medical history since clearance. Patient Active Problem List  
 Diagnosis Date Noted  Acute chest wall pain 03/17/2017  Cough 03/17/2017  
 SOB (shortness of breath) 03/17/2017  Therapeutic opioid induced constipation 03/17/2017  Acute hypoxemic respiratory failure ([address] 03/15/2017  Rib fractures 03/15/2017  Sleep apnea  Hypercholesteremia  Hyperlipidemia 05/31/2016  GERD (gastroesophageal reflux disease) 05/31/2016 Past Medical History:  
Diagnosis Date  Adverse effect of anesthesia \"difficulty waking up\"  Broken ribs 2017  Diverticula of colon  GERD (gastroesophageal reflux disease)   
 occasional  
 Hypercholesteremia  Microscopic hematuria   
 Skin cancer 05/2018  
 nose/jaw  Sleep apnea   
 uses CPAP Past Surgical History:  
Procedure Laterality Date  ABDOMEN SURGERY PROC UNLISTED  06/2016  
 bowel resec with illeostomy  ABDOMEN SURGERY PROC UNLISTED  09/2016  
 reversal of ileostomy  ABDOMEN SURGERY PROC UNLISTED  2017  
 repair of hernia r/t ileostomy reversal  
 HX ACL RECONSTRUCTION  1990  
 screw removal  
 HX APPENDECTOMY  1992  HX COLONOSCOPY  2015  HX HEENT  2008  
 left ear TM graft Gretta Albino HEENT  Sep 2009  
 left ear surgery - cholestoma  HX HEENT  Sep 2010  
 left ear TM graft revision Gretta Albino HEENT  Aug 1998  
 left ear tube placement  HX HEENT  2011  
 left ear drum replacement  HX ORTHOPAEDIC    
 left knee A/S x 3  
 HX ORTHOPAEDIC  1980's  
 left knee ACL repair  HX ORTHOPAEDIC  2000  
 reshape and removal scar tissue  HX ORTHOPAEDIC  09/2017  
 r foot surgery  HX OTHER SURGICAL  05/2018 Skin can cer removed from nose/jaw Family History Problem Relation Age of Onset  Heart Disease Father  Hypertension Sister Social History Substance Use Topics  Smoking status: Current Every Day Smoker Packs/day: 1.00 Years: 34.00  Smokeless tobacco: Never Used Comment: \"wants to try vaping\"  Alcohol use 12.6 oz/week 1 Standard drinks or equivalent, 20 Shots of liquor per week Comment: 20 drinks per wek Allergies Allergen Reactions  Wellbutrin [Bupropion] Swelling Angioedema  Adhesive Tape-Silicones Contact Dermatitis Pt reports he can tolerate paper tape.  Azithromycin Hives  Cephalexin Hives Pt has taken Augmentin before and tolerated it per rn  Hydromorphone Itching and Nausea Only  Ketamine Hives and Swelling Prior to Admission medications Medication Sig Start Date End Date Taking? Authorizing Provider  
flaxseed oil (OMEGA 3 PO) Take 2,000 mg by mouth daily. Yes Historical Provider  
cetirizine (ZYRTEC) 10 mg tablet Take 10 mg by mouth daily. In the morning. Yes Historical Provider  
fenofibrate (LOFIBRA) 160 mg tablet Take 160 mg by mouth daily. In the morning. Yes Historical Provider  
rosuvastatin (CRESTOR) 10 mg tablet Take 10 mg by mouth daily. In the morning. Yes Historical Provider  
omeprazole (PRILOSEC) 20 mg capsule Take 20 mg by mouth every other day. In the morning. Yes Historical Provider  
cholecalciferol, vitamin D3, 2,000 unit tab Take 4,000 Units by mouth daily. In the morning. Yes Historical Provider Review of Systems Left heel pain Objective:  
 
Patient Vitals for the past 8 hrs: 
 BP Temp Pulse Resp SpO2 Height Weight 10/05/18 0637 139/86 99.2 °F (37.3 °C) 80 18 96 % 5' 7\" (1.702 m) 94 kg (207 lb 3.7 oz) Temp (24hrs), Av.2 °F (37.3 °C), Min:99.2 °F (37.3 °C), Max:99.2 °F (37.3 °C) Physical Exam  
Pain on palpation left heel at the origin of the plantar fascia. Neurovascular status is intact. No open wounds. Data Review: No results found for this or any previous visit (from the past 24 hour(s)). Impression:  
 
Plantar fascitis and heel spur. Recommendation:  
 
Patient education for heel pain with plantar fasciitis. Consider Surigcal intervention. Kashmir Kulkarni Npo since midnight. Consent signed and in chart. No guarantees made to the outcome of the procedure. All risks complications and benefits explained. To OR for left EPF and heel spur resection. Signed By: Florentino Vitale DPM   
 October 5, 2018

## 2018-10-05 NOTE — PERIOP NOTES
When PT arrived to work with pt, pt refused. Assisted to wheelchair per this RN, IV removed and pt taken to car.

## 2018-10-05 NOTE — BRIEF OP NOTE
BRIEF OPERATIVE NOTE Date of Procedure: 10/5/2018 Preoperative Diagnosis: LEFT FOOT PAINFUL PLANTAR FASCIITIS Postoperative Diagnosis: LEFT FOOT PAINFUL PLANTAR FASCIITIS Procedure(s): LEFT FOOT ENDOSCOPIC PLANTAR FASCIOTOMY  (EIP 7:30) Surgeon(s) and Role: * Adebayo Orozco DPM - Primary Surgical Assistant: None Surgical Staff: 
Circ-1: Carmela Gómez RN 
Circ-Relief: Dino Haynes RN Scrub Tech-1: [facility] Time In Incision Start 7048 Incision Close 4087 Anesthesia: MAC Estimated Blood Loss: Minimal 
Specimens: * No specimens in log * Findings: Heel spur Complications: None Implants: * No implants in log *

## 2018-10-06 ENCOUNTER — HOSPITAL ENCOUNTER (OUTPATIENT)
Dept: GENERAL RADIOLOGY | Age: 48
Discharge: HOME | End: 2018-10-06
Attending: PODIATRIST
Payer: COMMERCIAL

## 2018-10-06 DIAGNOSIS — M77.9 BONE SPUR: ICD-10-CM

## 2018-10-06 PROCEDURE — 76000 FLUOROSCOPY <1 HR PHYS/QHP: CPT

## 2018-10-06 NOTE — ANESTHESIA POSTPROCEDURE EVALUATION
Post-Anesthesia Evaluation and Assessment Patient: Munira Encinas MRN: [id number]  SSN: [id number] YOB: 1970  Age: 46 y.o. Sex: male Cardiovascular Function/Vital Signs Visit Vitals  /73  Pulse (!) 59  Temp 36.6 °C (97.8 °F)  Resp 16  
 Ht 5' 7\" (1.702 m)  Wt 94 kg (207 lb 3.7 oz)  SpO2 92%  BMI 32.46 kg/m2 Patient is status post MAC anesthesia for Procedure(s): LEFT FOOT ENDOSCOPIC PLANTAR FASCIOTOMY  (EIP 7:30). Nausea/Vomiting: None Postoperative hydration reviewed and adequate. Pain: 
Pain Scale 1: Visual (10/05/18 0851) Pain Intensity 1: 0 (10/05/18 0851) Managed Neurological Status:  
Neuro (WDL): Exceptions to WDL (drowsy, awakens to voice) (10/05/18 0851) Neuro Neurologic State: Drowsy (10/05/18 9927) Orientation Level: Oriented X4 (10/05/18 0851) LUE Motor Response: Spontaneous  (10/05/18 0851) LLE Motor Response: Spontaneous  (10/05/18 0851) RUE Motor Response: Spontaneous  (10/05/18 0851) RLE Motor Response: Spontaneous  (10/05/18 0851) At baseline Mental Status and Level of Consciousness: Arousable Pulmonary Status:  
O2 Device: Room air (10/05/18 0857) Adequate oxygenation and airway patent Complications related to anesthesia: None Post-anesthesia assessment completed. No concerns Signed By: Nicholas Restrepo MD   
 October 6, 2018

## 2018-12-21 ENCOUNTER — OFFICE VISIT (OUTPATIENT)
Dept: SLEEP MEDICINE | Age: 48
End: 2018-12-21

## 2018-12-21 ENCOUNTER — DOCUMENTATION ONLY (OUTPATIENT)
Dept: SLEEP MEDICINE | Age: 48
End: 2018-12-21

## 2018-12-21 VITALS
DIASTOLIC BLOOD PRESSURE: 94 MMHG | WEIGHT: 208 LBS | OXYGEN SATURATION: 96 % | HEART RATE: 78 BPM | SYSTOLIC BLOOD PRESSURE: 152 MMHG | BODY MASS INDEX: 32.65 KG/M2 | HEIGHT: 67 IN

## 2018-12-21 DIAGNOSIS — G47.33 OSA (OBSTRUCTIVE SLEEP APNEA): Primary | ICD-10-CM

## 2018-12-21 NOTE — PATIENT INSTRUCTIONS

## 2018-12-21 NOTE — PROGRESS NOTES
[address] [facility]. [place], [address]  Tel.  [phone]  Fax. 100 [facility] 60  1001 [facility] [address], [address]  Tel.  [phone]  Fax. [phone] [address]  Tel.  [phone]  Fax. [phone]         Chief Complaint       Chief Complaint   Patient presents with    Sleep Problem     Dr Jacqui Bryant pt; yearly cpap follow up; BETTY Davis, need supplies         HPI        Юлия Rater is a  46 y.o. male seen for follow up. [facility] and diagnosed with sleep apnea. Sleep study demonstrated 148 events of which 17 were hypopnea and 131 apnea; of these 127 were obstructive, 2 were mixed and 2 were central.  The overall apnea-hypopnea index is 27.2/h. Events were more prominent supine with the supine-related AHI of 47.7/h. Minimal SaO2 89%. During a second study CPAP was employed. At 7 cm CPAP corresponding AHI was 2.9/h with minimal SaO2 briefly 87% and baseline greater than 92%. Currently, CPAP at 8 cm. Compliance data downloaded and reviewed in detail with the patient today. During the past 30 days, CPAP used during 30 days with the average daily use of 8.4 hours. CMS compliance criteria 100%. AHI 0.8 per hour. He notes he is doing well with CPAP. He is not experiencing significant nocturnal awakening, nonrestorative sleep or daytime fatigue. Allergies   Allergen Reactions    Wellbutrin [Bupropion] Swelling     Angioedema      Adhesive Tape-Silicones Contact Dermatitis     Pt reports he can tolerate paper tape.  Azithromycin Hives    Cephalexin Hives     Pt has taken Augmentin before and tolerated it per rn    Hydromorphone Itching and Nausea Only    Ketamine Hives and Swelling       Current Outpatient Medications   Medication Sig Dispense Refill    flaxseed oil (OMEGA 3 PO) Take 2,000 mg by mouth daily.  cetirizine (ZYRTEC) 10 mg tablet Take 10 mg by mouth daily. In the morning.  fenofibrate (LOFIBRA) 160 mg tablet Take 160 mg by mouth daily. In the morning.  rosuvastatin (CRESTOR) 10 mg tablet Take 10 mg by mouth daily. In the morning.  omeprazole (PRILOSEC) 20 mg capsule Take 20 mg by mouth every other day. In the morning.  cholecalciferol, vitamin D3, 2,000 unit tab Take 4,000 Units by mouth daily. In the morning. He  has a past medical history of Adverse effect of anesthesia, Broken ribs, Diverticula of colon, GERD (gastroesophageal reflux disease), Hypercholesteremia, Microscopic hematuria, Skin cancer, and Sleep apnea. He  has a past surgical history that includes hx appendectomy (1992); hx colonoscopy (2015); hx heent (2008); hx heent (Sep 2009); hx heent (Sep 2010); hx heent (Aug 1998); hx heent (2011); hx orthopaedic; hx orthopaedic (6651'O); hx acl reconstruction (1990); hx orthopaedic (2000); hx orthopaedic (09/2017); pr abdomen surgery proc unlisted (06/2016); pr abdomen surgery proc unlisted (09/2016); pr abdomen surgery proc unlisted (2017); hx other surgical (05/2018); LEFT FOOT ENDOSCOPIC PLANTAR FASCIOTOMY  (EIP 7:30) (Left, 10/5/2018); RIGHT FOOT ENDOSCOPIC PLANTAR FASCIOTOMY, HEEL SPUR RESECTION (Right, 9/15/2017); ROBOTIC/CONVERTED TO LAPAROSCOPIC, HAND-ASSISTED SIGMOID COLON RESECTION, ROBOTIC SPLEENIC FLEXURE MOBILIZATION,  LOOP ILEOSTOMY (N/A, 6/16/2016); CYSTOSCOPY INSERTION URETERAL CATHETERS (Bilateral, 6/16/2016); and CYSTOSCOPY (N/A, 11/3/2011). He family history includes Heart Disease in his father; Hypertension in his sister. He  reports that he has been smoking. He has a 34.00 pack-year smoking history. he has never used smokeless tobacco. He reports that he drinks about 12.6 oz of alcohol per week. He reports that he does not use drugs.      Review of Systems:  Unchanged per patient      Objective:     Visit Vitals  BP (!) 152/94 Comment: 142 92   Pulse 78   Ht 5' 7\" (1.702 m)   Wt 208 lb (94.3 kg)   SpO2 96%   BMI 32.58 kg/m²     Body mass index is 32.58 kg/m². Assessment:       ICD-10-CM ICD-9-CM    1. DARYL (obstructive sleep apnea) G47.33 327.23 AMB SUPPLY ORDER     Sleep disordered breathing responding consistently well to CPAP at 8 cm. He will continue at the current pressure settings. He will contact the office for specific problems. Plan:     Orders Placed This Encounter    AMB SUPPLY ORDER     Diagnosis: Obstructive Sleep Apnea ICD-10 Code (G47.33)     CPAP mask and supplies -  Patient preference, headgear, heated tubing, and filter;  heated humidifier; wireless modem. Remote monitoring enrollment. Shady Qureshi MD, FAASM  Diplomate, American Board of Sleep Medicine       * Patient has a history and examination consistent with the diagnosis of sleep apnea. * He was provided information on sleep apnea including corresponding risk factors and the importance of proper treatment. * Treatment options if indicated were reviewed today. Potential benefit of weight reduction was discussed. Weight reduction techniques reviewed. Shady Qureshi MD, FAASM  Electronically signed 12/21/18        This note was created using voice recognition software. Despite editing, there may be syntax errors. This note will not be viewable in [address]

## 2018-12-26 ENCOUNTER — DOCUMENTATION ONLY (OUTPATIENT)
Dept: SLEEP MEDICINE | Age: 48
End: 2018-12-26

## 2019-12-19 ENCOUNTER — OFFICE VISIT (OUTPATIENT)
Dept: SLEEP MEDICINE | Age: 49
End: 2019-12-19

## 2019-12-19 VITALS
DIASTOLIC BLOOD PRESSURE: 74 MMHG | HEART RATE: 78 BPM | OXYGEN SATURATION: 97 % | WEIGHT: 198 LBS | HEIGHT: 67 IN | BODY MASS INDEX: 31.08 KG/M2 | SYSTOLIC BLOOD PRESSURE: 134 MMHG

## 2019-12-19 DIAGNOSIS — G47.33 OSA (OBSTRUCTIVE SLEEP APNEA): Primary | ICD-10-CM

## 2019-12-19 NOTE — PROGRESS NOTES
217 [facility] [place]. [place], [address]  Tel.  [phone]  Fax. 100 [facility] 60  [place], [address] [facility]  Tel.  [phone]  Fax. [phone] 9250 [facility]   Tel.  [phone]  Fax. [phone]         Chief Complaint       Chief Complaint   Patient presents with    Sleep Problem     yearly follow up         HPI        Rhina Bah is a 44 y.o. male seen for follow-up. [facility] and diagnosed with sleep apnea. Sleep study demonstrated 148 events of which 17 were hypopnea and 131 apnea; of these 127 were obstructive, 2 were mixed and 2 were central.  The overall apnea-hypopnea index is 27.2/h. Events were more prominent supine with the supine-related AHI of 47.7/h. Minimal SaO2 89%. During a second study CPAP was employed. At 7 cm CPAP corresponding AHI was 2.9/h with minimal SaO2 briefly 87% and baseline greater than 92%.     Currently, CPAP at 8 cm. Compliance data downloaded and reviewed in detail with the patient today. During the past 30 days, CPAP used during 30 days with the average daily use of 8.1 hours. CMS compliance criteria 100%. AHI 0.7 per hour. He notes he is doing well with CPAP; not experiencing nonrestorative sleep or daytime fatigue. He uses CPAP constantly and uses battery when camping. Allergies   Allergen Reactions    Wellbutrin [Bupropion] Swelling     Angioedema      Adhesive Tape-Silicones Contact Dermatitis     Pt reports he can tolerate paper tape.  Azithromycin Hives    Cephalexin Hives     Pt has taken Augmentin before and tolerated it per rn    Hydromorphone Itching and Nausea Only    Ketamine Hives and Swelling       Current Outpatient Medications   Medication Sig Dispense Refill    flaxseed oil (OMEGA 3 PO) Take 2,000 mg by mouth daily.  cetirizine (ZYRTEC) 10 mg tablet Take 10 mg by mouth daily. In the morning.       fenofibrate (LOFIBRA) 160 mg tablet Take 160 mg by mouth daily. In the morning.  rosuvastatin (CRESTOR) 10 mg tablet Take 10 mg by mouth daily. In the morning.  omeprazole (PRILOSEC) 20 mg capsule Take 20 mg by mouth every other day. In the morning.  cholecalciferol, vitamin D3, 2,000 unit tab Take 4,000 Units by mouth daily. In the morning. He  has a past medical history of Adverse effect of anesthesia, Broken ribs (2017), Diverticula of colon, GERD (gastroesophageal reflux disease), Hypercholesteremia, Microscopic hematuria, Skin cancer (05/2018), and Sleep apnea. He  has a past surgical history that includes hx appendectomy (1992); hx colonoscopy (2015); hx heent (2008); hx heent (Sep 2009); hx heent (Sep 2010); hx heent (Aug 1998); hx heent (2011); hx orthopaedic; hx orthopaedic (9123'C); hx acl reconstruction (1990); hx orthopaedic (2000); hx orthopaedic (09/2017); pr abdomen surgery proc unlisted (06/2016); pr abdomen surgery proc unlisted (09/2016); pr abdomen surgery proc unlisted (2017); and hx other surgical (05/2018). He family history includes Heart Disease in his father; Hypertension in his sister. He  reports that he has been smoking. He has a 34.00 pack-year smoking history. He has never used smokeless tobacco. He reports current alcohol use of about 21.0 standard drinks of alcohol per week. He reports that he does not use drugs. Review of Systems:  Unchanged per patient      Objective:     Visit Vitals  /74   Pulse 78   Ht 5' 7\" (1.702 m)   Wt 198 lb (89.8 kg)   SpO2 97%   BMI 31.01 kg/m²     Body mass index is 31.01 kg/m². General:   Conversant, cooperative   Eyes:  Pupils equal and reactive, no nystagmus   Oropharynx:   Mallampati score II, tongue normal            Chest/Lungs:  Clear on auscultation    CVS:  Normal rate, regular rhythm        Neuro:  Speech fluent, face symmetrical             Assessment:       ICD-10-CM ICD-9-CM    1.  DARYL (obstructive sleep apnea) G47.33 327.23    Sleep disordered breathing responding consistently to CPAP. He will continue with the current pressure settings. He will contact the office for specific problems. Follow-up appointment will be scheduled. he is compliant with PAP therapy and PAP continues to benefit patient and remains necessary for control of his sleep apnea. Plan:   No orders of the defined types were placed in this encounter. * Patient has a history and examination consistent with the diagnosis of sleep apnea. * He was provided information on sleep apnea including corresponding risk factors and the importance of proper treatment. * Treatment options if indicated were reviewed today. Potential benefit of weight reduction     Delmis Villanueva MD, FAASM  Electronically signed 12/19/19        This note was created using voice recognition software. Despite editing, there may be syntax errors. This note will not be viewable in [address]

## 2019-12-19 NOTE — PATIENT INSTRUCTIONS

## 2021-01-05 ENCOUNTER — DOCUMENTATION ONLY (OUTPATIENT)
Dept: SLEEP MEDICINE | Age: 51
End: 2021-01-05

## 2021-01-05 ENCOUNTER — VIRTUAL VISIT (OUTPATIENT)
Dept: SLEEP MEDICINE | Age: 51
End: 2021-01-05
Payer: COMMERCIAL

## 2021-01-05 DIAGNOSIS — G47.33 OSA (OBSTRUCTIVE SLEEP APNEA): Primary | ICD-10-CM

## 2021-01-05 PROCEDURE — 99213 OFFICE O/P EST LOW 20 MIN: CPT | Performed by: SPECIALIST

## 2021-01-05 NOTE — PROGRESS NOTES
[address] [place]. [place], [address]  Tel.  [phone]  Fax. 100 [facility] 60  1001 [facility] [address], [address]  Tel.  [phone]  Fax. [phone] 9250 [facility] [address] [place]   Tel.  [phone]  Fax. [phone]       Kerin Duane is a 44 y.o. male who was seen by synchronous (real-time) audio-video technology on 1/5/2021. Consent:  He and/or his healthcare decision maker is aware that this patient-initiated Telehealth encounter is a billable service, with coverage as determined by his insurance carrier. He is aware that he may receive a bill and has provided verbal consent to proceed: Yes    I was in the office while conducting this encounter. Chief Complaint       No chief complaint on file. HPI        Kerin Duane is a 44 y.o. male seen for follow-up. [facility] and diagnosed with sleep apnea.  Sleep study demonstrated 148 events of which 17 were hypopnea and 131 apnea; of these 127 were obstructive, 2 were mixed and 2 were central.  The overall apneahypopnea index is 27.2/h.  Events were more prominent supine with the supinerelated AHI of 47.7/h.  Minimal SaO2 89%.  During a second study CPAP was employed.  At 7 cm CPAP corresponding AHI was 2.9/h with minimal SaO2 briefly 87% and baseline greater than 92%.     Currently, CPAP at 8 cm. Compliance data downloaded and reviewed in detail with the patient today. During the past 30 days, CPAP used during 30 days with the average daily use of 8.85 hours. CMS compliance criteria 100%. AHI 0.7 per hour. He is doing well without nocturnal awakening, non-=rstoriatabve sleep or daytime fatigue. Allergies   Allergen Reactions    Wellbutrin [Bupropion] Swelling     Angioedema      Adhesive Tape-Silicones Contact Dermatitis     Pt reports he can tolerate paper tape.     Azithromycin Hives    Cephalexin Hives     Pt has taken Augmentin before and tolerated it per rn    Hydromorphone Itching and Nausea Only    Ketamine Hives and Swelling       Current Outpatient Medications   Medication Sig Dispense Refill    flaxseed oil (OMEGA 3 PO) Take 2,000 mg by mouth daily.  cetirizine (ZYRTEC) 10 mg tablet Take 10 mg by mouth daily. In the morning.  fenofibrate (LOFIBRA) 160 mg tablet Take 160 mg by mouth daily. In the morning.  rosuvastatin (CRESTOR) 10 mg tablet Take 10 mg by mouth daily. In the morning.  omeprazole (PRILOSEC) 20 mg capsule Take 20 mg by mouth every other day. In the morning.  cholecalciferol, vitamin D3, 2,000 unit tab Take 4,000 Units by mouth daily. In the morning. He  has a past medical history of Adverse effect of anesthesia, Broken ribs (2017), Diverticula of colon, GERD (gastroesophageal reflux disease), Hypercholesteremia, Microscopic hematuria, Skin cancer (05/2018), and Sleep apnea. He  has a past surgical history that includes hx appendectomy (1992); hx colonoscopy (2015); hx heent (2008); hx heent (Sep 2009); hx heent (Sep 2010); hx heent (Aug 1998); hx heent (2011); hx orthopaedic; hx orthopaedic (5492'D); hx acl reconstruction (1990); hx orthopaedic (2000); hx orthopaedic (09/2017); pr abdomen surgery proc unlisted (06/2016); pr abdomen surgery proc unlisted (09/2016); pr abdomen surgery proc unlisted (2017); and hx other surgical (05/2018). He family history includes Heart Disease in his father; Hypertension in his sister. He  reports that he has been smoking. He has a 34.00 pack-year smoking history. He has never used smokeless tobacco. He reports current alcohol use of about 21.0 standard drinks of alcohol per week. He reports that he does not use drugs. Review of Systems:  Unchanged per patient    Due to this being a telemedicine evaluation, certain elements of the physical examination are unable to be assessed.   Objective:     Height: 5'7\"  Weight:198 lb  BMI: 31    General: Conversant, cooperative   Eyes:   no nystagmus                            Neuro:  Speech fluent, face symmetrical             Assessment:       ICD-10-CM ICD-9-CM    1. DARYL (obstructive sleep apnea)  G47.33 327.23 AMB SUPPLY ORDER       he is compliant with PAP therapy and PAP continues to benefit patient and remains necessary for control of his sleep apnea. Plan:     Orders Placed This Encounter    AMB SUPPLY ORDER     Diagnosis: Obstructive Sleep Apnea ICD-10 Code (G47.33)      CPAP mask and supplies/  Patient preference, headgear, heated tubing, and filter;  heated humidifier. Wireless modem. Remote monitoring enrollment.  Oral/Nasal Combo Mask 1 every 3 months.  Oral Cushion Combo Mask (Replace) 2 per month.  Nasal Pillows Combo Mask (Replace) 2 per month.  Full Face Mask 1 every 3 months.  Full Face Mask Cushion 1 per month.  Nasal Cushion (Replace) 2 per month.  Nasal Pillows (Replace) 2 per month.  Nasal Interface Mask 1 every 3 months.  Headgear 1 every 6 months.  Chinstrap 1 every 6 months.  Tubing 1 every 3 months.  Filter(s) Disposable 2 per month.  Filter(s) Non-Disposable 1 every 6 months.  Oral Interface 1 every 3 months. [address] [facility] for [employer] (Replace) 1 every 6 months.  Tubing with heating element 1 every 3 months.                 Anne Marie Davis MD, [facility]  Diplomate, American Board of Sleep Medicine  NPI [id number]  Electronically signed 1/5/21       * Patient has a history and examination consistent with the diagnosis of sleep apnea. * He was provided information on sleep apnea including corresponding risk factors and the importance of proper treatment. * Treatment options if indicated were reviewed today.     *Potential benefit of weight reduction       Anne Marie Davis MD, [facility]  Electronically signed 01/05/21        This note was created using voice recognition software. Despite editing, there may be syntax errors. This note will not be viewable in [address] Pursuant to the emergency declaration under the [facility] waiver authority and the [employer] and [employer] Act, this Virtual  Visit was conducted, with patient's consent, to reduce the patient's risk of exposure to COVID-19 and provide continuity of care for an established patient. Services were provided through a video synchronous discussion virtually to substitute for in-person clinic visit. Damien Coats MD     Greater than 15 minutes spent: in direct video patient care, planning and chart review.

## 2021-06-22 ENCOUNTER — VIRTUAL VISIT (OUTPATIENT)
Dept: SLEEP MEDICINE | Age: 51
End: 2021-06-22
Payer: COMMERCIAL

## 2021-06-22 ENCOUNTER — DOCUMENTATION ONLY (OUTPATIENT)
Dept: SLEEP MEDICINE | Age: 51
End: 2021-06-22

## 2021-06-22 VITALS — HEIGHT: 67 IN | WEIGHT: 204 LBS | BODY MASS INDEX: 32.02 KG/M2

## 2021-06-22 DIAGNOSIS — G47.33 OSA (OBSTRUCTIVE SLEEP APNEA): Primary | ICD-10-CM

## 2021-06-22 PROCEDURE — 99213 OFFICE O/P EST LOW 20 MIN: CPT | Performed by: SPECIALIST

## 2021-06-22 NOTE — PROGRESS NOTES
[address] [place]. [place], [address]   Tel.  [phone]   Fax. 100 [facility] 60   [place], [address]   Tel.  [phone]   Fax. [phone] [address]   Tel.  [phone]   Fax. [address] (: 1970) is a 44 y.o. male, established patient, seen for positive airway pressure follow-up, he was last seen by Dr. Sharmaine Marina on 2021, prior notes reviewed in detail. In lab sleep test (? [facility]) showed AHI of 27.2/hr with a lowest SpO2 of 89%. He is seen today for his yearly evaluation. ASSESSMENT/PLAN:    ICD-10-CM ICD-9-CM    1. DARYL (obstructive sleep apnea)  G47.33 327.23 AMB SUPPLY ORDER   2. BMI 31.0-31.9,adult  Z68.31 V85.31      AHI = 27.2 (? External lab). On Resmed CPAP : 8 cmH2O. Set up 2014. He is adherent with PAP therapy and PAP continues to benefit patient and remains necessary for control of his sleep apnea. 1. Sleep Apnea - Continue on current pressures: CPAP 8 cm H2O    His device has exceeded the recommended life span. Order for new device placed. * Supplies ordered - nasal pillows mask and heated tubing    Orders Placed This Encounter    AMB SUPPLY ORDER     Primary Encounter Diagnosis: Obstructive Sleep Apnea  (G47.33)    ResMed Device with Heated Humidifer [id number] / [id number]. Positive Airway Pressure Therapy: Duration of need: 99 months. Set Pressure: 8 cmH2O     Nasal Cushion (Replace) 2 per month.  Nasal Interface Mask 1 every 3 months.  Headgear 1 every 6 months.  Filter(s) Disposable 2 per month.  Filter(s) Non-Disposable 1 every 6 months. [address] [facility] for [employer] (Replace) 1 every 6 months.  Tubing with heating element 1 every 3 months. Perform Mask Fitting per patient preference and comfort - replace as above.        PRADEEP Butler, [facility] NPI: [id number]  Electronically signed. 06/22/21     *  Counseling was provided regarding the importance of regular PAP use with emphasis on ensuring sufficient total sleep time, proper sleep hygiene, and safe driving. * Re-enforced proper and regular cleaning for the device. * He was asked to contact our office for any problems regarding PAP therapy. 2. Recommended a dedicated weight loss program through appropriate diet and exercise regimen as significant weight reduction has been shown to reduce severity of obstructive sleep apnea. He and his wife plan on getting back into the gym once she is fully recovered from foot surgery. SUBJECTIVE/OBJECTIVE:    He  is seen today for follow up on PAP device and reports no problems using the device. The following concerns reviewed:    Drowsiness no Problems exhaling no   Snoring no Forget to put on no   Mask Comfortable yes Can't fall asleep no   Dry Mouth no Mask falls off no   Air Leaking no Frequent awakenings no       He admits that his sleep has significantly improved on PAP therapy using mask and heated tubing. Review of device download indicated:  CPAP pressure: 8 cmH2O;  95th Percentile Leak: 3.5 L/min  % Used Days >= 4 hours: 100. Avg hours used:  8.50. Therapy Apnea Index averaged over PAP use: 0.9 /hr which reflects significantly improved sleep breathing condition. Ansted Sleepiness Score: 0 and   which reflects improved sleep quality over therapy time. Sleep Review of Systems: notable for Negative difficulty falling asleep; Negative awakenings at night; Negative early morning headaches; Negative memory problems; Negative concentration issues; Negative chest pain; Negative shortness of breath; Negative significant joint pain at night; Negative significant muscle pain at night; Negative rashes or itching; Negative heartburn; Negative significant mood issues; 0 afternoon naps per week    Vitals reported by patient   No flowsheet data found.    Calculated BMI 31    Physical Exam completed by visual and auditory observation of patient with verbal input from patient. General:   Alert, oriented, not in acute distress   Eyes:  Anicteric Sclerae; no obvious strabismus   Nose:  No obvious nasal septum deviation    Neck:   Midline trachea, no visible mass   Chest/Lungs:  Respiratory effort normal, no visualized signs of difficulty breathing or respiratory distress   CVS:  No JVD   Extremities:  No obvious rashes noted on face, neck, or hands   Neuro:  No facial asymmetry, no focal deficits; no obvious tremor    Psych:  Normal affect,  normal countenance     Yamile Albert is being evaluated by a Virtual Visit (video visit) encounter to address concerns as mentioned above. A caregiver was present when appropriate. Due to this being a TeleHealth encounter (During KBXJL-91 public health emergency), evaluation of the following organ systems was limited: Vitals/Constitutional/EENT/Resp/CV/GI//MS/Neuro/Skin/Heme-Lymph-Imm. Pursuant to the emergency declaration under the [address] [place] [address] and the [employer] and [employer] Act, this Virtual Visit was conducted with patient's (and/or legal guardian's) consent, to reduce the patient's risk of exposure to COVID-19 and provide necessary medical care. Patient identification was verified at the start of the visit: YES using name and date of birth. Patient's phone number [phone] (home)  was confirmed for accuracy. He gives permission for messages regarding results and appointments to be left at that number. Services were provided through a video synchronous discussion virtually to substitute for in-person clinic visit. I was in the office while conducting this encounter, patient located at their home or alternate location of their choice.     On this date 06/22/2021 I have spent 20 minutes reviewing previous notes, test results and face to face with the patient discussing the diagnosis and importance of compliance with the treatment plan as well as documenting on the day of the visit. An electronic signature was used to authenticate this note.     -- Elizabeth Scherer NP, [facility]  06/22/21     Reviewed, agree with assessment  Goldy Felix M.D.  6/22/21

## 2021-06-22 NOTE — PATIENT INSTRUCTIONS
[address] [place]. [address] [facility], [address] Tel.  [phone] Fax. 100 [facility] 60 [place], [address] Tel.  [phone] Fax. [phone] [address] [place]on Tel.  [phone] Fax. [phone] Learning About CPAP for Sleep Apnea What is CPAP? CPAP is a small machine that you use at home every night while you sleep. It increases air pressure in your throat to keep your airway open. When you have sleep apnea, this can help you sleep better so you feel much better. CPAP stands for \"continuous positive airway pressure. \" The CPAP machine will have one of the following: · A mask that covers your nose and mouth · Prongs that fit into your nose · A mask that covers your nose only, the most common type. This type is called NCPAP. The N stands for \"nasal.\" Why is it done? CPAP is usually the best treatment for obstructive sleep apnea. It is the first treatment choice and the most widely used. Your doctor may suggest CPAP if you have: · Moderate to severe sleep apnea. · Sleep apnea and coronary artery disease (CAD) or heart failure. How does it help? · CPAP can help you have more normal sleep, so you feel less sleepy and more alert during the daytime. · CPAP may help keep heart failure or other heart problems from getting worse. · NCPAP may help lower your blood pressure. · If you use CPAP, your bed partner may also sleep better because you are not snoring or restless. What are the side effects? Some people who use CPAP have: · A dry or stuffy nose and a sore throat. · Irritated skin on the face. · Sore eyes. · Bloating. If you have any of these problems, work with your doctor to fix them. Here are some things you can try: · Be sure the mask or nasal prongs fit well. · See if your doctor can adjust the pressure of your CPAP. · If your nose is dry, try a humidifier.  
· If your nose is runny or stuffy, try decongestant medicine or a steroid nasal spray. If these things do not help, you might try a different type of machine. Some machines have air pressure that adjusts on its own. Others have air pressures that are different when you breathe in than when you breathe out. This may reduce discomfort caused by too much pressure in your nose. Where can you learn more? Go to [employer] Enter E484 in the search box to learn more about \"Learning About CPAP for Sleep Apnea. \"  
© 4643-3503 Healthwise, Incorporated. Care instructions adapted under license by [facility] [employer] (which disclaims liability or warranty for this information). This care instruction is for use with your licensed healthcare professional. If you have questions about a medical condition or this instruction, always ask your healthcare professional. [address] any warranty or liability for your use of this information. Content Version: 8.6.74394; Last Revised: January 11, 2010 PROPER SLEEP HYGIENE What to avoid · Do not have drinks with caffeine, such as coffee or black tea, for 8 hours before bed. · Do not smoke or use other types of tobacco near bedtime. Nicotine is a stimulant and can keep you awake. · Avoid drinking alcohol late in the evening, because it can cause you to wake in the middle of the night. · Do not eat a big meal close to bedtime. If you are hungry, eat a light snack. · Do not drink a lot of water close to bedtime, because the need to urinate may wake you up during the night. · Do not read or watch TV in bed. Use the bed only for sleeping and sexual activity. What to try · Go to bed at the same time every night, and wake up at the same time every morning. Do not take naps during the day. · Keep your bedroom quiet, dark, and cool. · Get regular exercise, but not within 3 to 4 hours of your bedtime. Liliana Mccall · Sleep on a comfortable pillow and mattress.  
· If watching the clock makes you anxious, turn it facing away from you so you cannot see the time. · If you worry when you lie down, start a worry book. Well before bedtime, write down your worries, and then set the book and your concerns aside. · Try meditation or other relaxation techniques before you go to bed. · If you cannot fall asleep, get up and go to another room until you feel sleepy. Do something relaxing. Repeat your bedtime routine before you go to bed again. · Make your house quiet and calm about an hour before bedtime. Turn down the lights, turn off the TV, log off the computer, and turn down the volume on music. This can help you relax after a busy day. Drowsy Driving: The [facility] cites drowsiness as a causing factor in more than 682,677 police reported crashes annually, resulting in 76,000 injuries and 1,500 deaths. Other surveys suggest 55% of people polled have driven while drowsy in the past year, 23% had fallen asleep but not crashed, 3% crashed, and 2% had and accident due to drowsy driving. Who is at risk? Young Drivers: One study of drowsy driving accidents states that 55% of the drivers were under 25 years. Of those, 75% were male. Shift Workers and Travelers: People who work overnight or travel across time zones frequently are at higher risk of experiencing Circadian Rhythm Disorders. They are trying to work and function when their body is programed to sleep. Sleep Deprived: Lack of sleep has a serious impact on your ability to pay attention or focus on a task. Consistently getting less than the average of 8 hours your body needs creates partial or cumulative sleep deprivation. Untreated Sleep Disorders: Sleep Apnea, Narcolepsy, R.L.S., and other sleep disorders (untreated) prevent a person from getting enough restful sleep. This leads to excessive daytime sleepiness and increases the risk for drowsy driving accidents by up to 7 times.  
Medications / Alcohol: Even over the counter medications can cause drowsiness. Medications that impair a drivers attention should have a warning label. Alcohol naturally makes you sleepy and on its own can cause accidents. Combined with excessive drowsiness its effects are amplified. Signs of Drowsy Driving: * You don't remember driving the last few miles * You may drift out of your santiago * You are unable to focus and your thoughts wander * You may yawn more often than normal 
 * You have difficulty keeping your eyes open / nodding off * Missing traffic signs, speeding, or tailgating Prevention-  
Good sleep hygiene, lifestyle and behavioral choices have the most impact on drowsy driving. There is no substitute for sleep and the average person requires 8 hours nightly. If you find yourself driving drowsy, stop and sleep. Consider the sleep hygiene tips provided during your visit as well. Medication Refill Policy: Refills for all medications require 1 week advance notice. Please have your pharmacy fax a refill request. We are unable to fax, or call in \"controled substance\" medications and you will need to pick these prescriptions up from our office. Our[employer]helf Activation Thank you for requesting access to OurShelf. Please follow the instructions below to securely access and download your online medical record. OurShelf allows you to send messages to your doctor, view your test results, renew your prescriptions, schedule appointments, and more. How Do I Sign Up? 1. In your internet browser, go to [email or web address]. 2. Click on the First Time User? Click Here link in the Sign In box. You will see the New Member Sign Up page. 3. Enter your OurShelf Access Code exactly as it appears below. You will not need to use this code after youve completed the sign-up process. If you do not sign up before the expiration date, you must request a new code. OurShelf Access Code:  Activation code not generated Current Plasticell Status: Active (This is the date your Plasticell access code will ) 4. Enter the last four digits of your Social Security Number (xxxx) and Date of Birth (mm/dd/yyyy) as indicated and click Submit. You will be taken to the next sign-up page. 5. Create a [email or web address] ID. This will be your Plasticell login ID and cannot be changed, so think of one that is secure and easy to remember. 6. Create a Plasticell password. You can change your password at any time. 7. Enter your Password Reset Question and Answer. This can be used at a later time if you forget your password. 8. Enter your e-mail address. You will receive e-mail notification when new information is available in [address] 9. Click Sign Up. You can now view and download portions of your medical record. 10. Click the Download Summary menu link to download a portable copy of your medical information. Additional Information If you have questions, please call [phone]. Remember, Plasticell is NOT to be used for urgent needs. For medical emergencies, dial 911.

## 2021-06-29 ENCOUNTER — DOCUMENTATION ONLY (OUTPATIENT)
Dept: SLEEP MEDICINE | Age: 51
End: 2021-06-29

## 2022-03-18 PROBLEM — R07.89 ACUTE CHEST WALL PAIN: Status: ACTIVE | Noted: 2017-03-17

## 2022-03-18 PROBLEM — K59.03 THERAPEUTIC OPIOID INDUCED CONSTIPATION: Status: ACTIVE | Noted: 2017-03-17

## 2022-03-18 PROBLEM — T40.2X5A THERAPEUTIC OPIOID INDUCED CONSTIPATION: Status: ACTIVE | Noted: 2017-03-17

## 2022-03-19 PROBLEM — J96.01 ACUTE HYPOXEMIC RESPIRATORY FAILURE (HCC): Status: ACTIVE | Noted: 2017-03-15

## 2022-03-19 PROBLEM — R06.02 SOB (SHORTNESS OF BREATH): Status: ACTIVE | Noted: 2017-03-17

## 2022-03-19 PROBLEM — S22.49XA RIB FRACTURES: Status: ACTIVE | Noted: 2017-03-15

## 2022-03-20 PROBLEM — R05.9 COUGH: Status: ACTIVE | Noted: 2017-03-17

## 2022-04-30 ENCOUNTER — HOSPITAL ENCOUNTER (EMERGENCY)
Age: 52
Discharge: HOME | End: 2022-04-30
Attending: EMERGENCY MEDICINE
Payer: COMMERCIAL

## 2022-04-30 VITALS
WEIGHT: 200 LBS | BODY MASS INDEX: 31.39 KG/M2 | OXYGEN SATURATION: 95 % | TEMPERATURE: 97.8 F | HEIGHT: 67 IN | RESPIRATION RATE: 14 BRPM | HEART RATE: 65 BPM | DIASTOLIC BLOOD PRESSURE: 73 MMHG | SYSTOLIC BLOOD PRESSURE: 133 MMHG

## 2022-04-30 DIAGNOSIS — T78.2XXA ANAPHYLAXIS, INITIAL ENCOUNTER: Primary | ICD-10-CM

## 2022-04-30 PROCEDURE — 96374 THER/PROPH/DIAG INJ IV PUSH: CPT

## 2022-04-30 PROCEDURE — 74011000250 HC RX REV CODE- 250: Performed by: STUDENT IN AN ORGANIZED HEALTH CARE EDUCATION/TRAINING PROGRAM

## 2022-04-30 PROCEDURE — 96372 THER/PROPH/DIAG INJ SC/IM: CPT

## 2022-04-30 PROCEDURE — 93005 ELECTROCARDIOGRAM TRACING: CPT

## 2022-04-30 PROCEDURE — 99284 EMERGENCY DEPT VISIT MOD MDM: CPT

## 2022-04-30 PROCEDURE — 74011250636 HC RX REV CODE- 250/636

## 2022-04-30 PROCEDURE — 96375 TX/PRO/DX INJ NEW DRUG ADDON: CPT

## 2022-04-30 PROCEDURE — 74011250636 HC RX REV CODE- 250/636: Performed by: STUDENT IN AN ORGANIZED HEALTH CARE EDUCATION/TRAINING PROGRAM

## 2022-04-30 RX ORDER — EPINEPHRINE 0.3 MG/.3ML
0.3 INJECTION SUBCUTANEOUS
Qty: 1 EACH | Refills: 1 | Status: SHIPPED | OUTPATIENT
Start: 2022-04-30 | End: 2022-04-30 | Stop reason: SDUPTHER

## 2022-04-30 RX ORDER — DIPHENHYDRAMINE HYDROCHLORIDE 50 MG/ML
INJECTION, SOLUTION INTRAMUSCULAR; INTRAVENOUS
Status: COMPLETED
Start: 2022-04-30 | End: 2022-04-30

## 2022-04-30 RX ORDER — EPINEPHRINE 0.3 MG/.3ML
0.3 INJECTION SUBCUTANEOUS
Qty: 1 EACH | Refills: 1 | Status: SHIPPED | OUTPATIENT
Start: 2022-04-30 | End: 2022-04-30

## 2022-04-30 RX ORDER — PREDNISONE 50 MG/1
50 TABLET ORAL DAILY
Qty: 3 TABLET | Refills: 0 | Status: SHIPPED | OUTPATIENT
Start: 2022-04-30 | End: 2022-05-03

## 2022-04-30 RX ORDER — FAMOTIDINE 10 MG/ML
INJECTION INTRAVENOUS
Status: DISCONTINUED
Start: 2022-04-30 | End: 2022-04-30 | Stop reason: HOSPADM

## 2022-04-30 RX ORDER — DIPHENHYDRAMINE HYDROCHLORIDE 50 MG/ML
50 INJECTION, SOLUTION INTRAMUSCULAR; INTRAVENOUS ONCE
Status: COMPLETED | OUTPATIENT
Start: 2022-04-30 | End: 2022-04-30

## 2022-04-30 RX ORDER — EPINEPHRINE 1 MG/ML
0.3 INJECTION, SOLUTION, CONCENTRATE INTRAVENOUS
Status: COMPLETED | OUTPATIENT
Start: 2022-04-30 | End: 2022-04-30

## 2022-04-30 RX ORDER — EPINEPHRINE 1 MG/ML
INJECTION, SOLUTION, CONCENTRATE INTRAVENOUS
Status: COMPLETED
Start: 2022-04-30 | End: 2022-04-30

## 2022-04-30 RX ADMIN — EPINEPHRINE 0.3 MG: 1 INJECTION, SOLUTION, CONCENTRATE INTRAVENOUS at 12:43

## 2022-04-30 RX ADMIN — SODIUM CHLORIDE 1000 ML: 9 INJECTION, SOLUTION INTRAVENOUS at 13:14

## 2022-04-30 RX ADMIN — DIPHENHYDRAMINE HYDROCHLORIDE 50 MG: 50 INJECTION, SOLUTION INTRAMUSCULAR; INTRAVENOUS at 12:50

## 2022-04-30 RX ADMIN — METHYLPREDNISOLONE SODIUM SUCCINATE 125 MG: 125 INJECTION, POWDER, FOR SOLUTION INTRAMUSCULAR; INTRAVENOUS at 13:14

## 2022-04-30 RX ADMIN — SODIUM CHLORIDE, PRESERVATIVE FREE 20 MG: 5 INJECTION INTRAVENOUS at 13:12

## 2022-04-30 NOTE — ED NOTES
Pt continues to improve. No further tremors noted. Redness dissipated. Pt able to speak in full sentences and manage his own secretions.

## 2022-04-30 NOTE — ED PROVIDER NOTES
Patient is a 53-year-old male present emergency department for concerns of anaphylaxis. Patient presents with diffuse hives, redness over his body increased heart rate, tremors as a sensation that his tongue is swelling at the tip of his tongue. Symptoms started approximately an hour ago. He had taken thigh diclofenac 75 mg for the first time this morning and symptoms started. Patient is able to speak in full sentences despite tachycardia O2 sats 100% on room air. Patient recently seen Dr. Rom Ayoub with orthopedics who prescribed him prednisone and diclofenac.            Past Medical History:   Diagnosis Date    Adverse effect of anesthesia     \"difficulty waking up\"    Broken ribs 2017    Diverticula of colon     GERD (gastroesophageal reflux disease)     occasional    Hypercholesteremia     Microscopic hematuria     Skin cancer 05/2018    nose/jaw    Sleep apnea     uses CPAP       Past Surgical History:   Procedure Laterality Date    ABDOMEN SURGERY PROC UNLISTED  06/2016    bowel resec with illeostomy    ABDOMEN SURGERY PROC UNLISTED  09/2016    reversal of ileostomy    ABDOMEN SURGERY PROC UNLISTED  2017    repair of hernia r/t ileostomy reversal    HX ACL RECONSTRUCTION  1990    screw removal    HX APPENDECTOMY  1992    HX COLONOSCOPY  2015    HX HEENT  2008    left ear TM graft    HX HEENT  Sep 2009    left ear surgery - cholestoma    HX HEENT  Sep 2010    left ear TM graft revision    HX HEENT  Aug 1998    left ear tube placement    HX HEENT  2011    left ear drum replacement    HX ORTHOPAEDIC      left knee A/S x 3    HX ORTHOPAEDIC  1980's    left knee ACL repair    HX ORTHOPAEDIC  2000    reshape and removal scar tissue    HX ORTHOPAEDIC  09/2017    r foot surgery    HX OTHER SURGICAL  05/2018    Skin can cer removed from nose/jaw         Family History:   Problem Relation Age of Onset    Heart Disease Father     Hypertension Sister        Social History     Socioeconomic History  Marital status:      Spouse name: Not on file    Number of children: Not on file    Years of education: Not on file    Highest education level: Not on file   Occupational History    Not on file   Tobacco Use    Smoking status: Current Every Day Smoker     Packs/day: 1.00     Years: 34.00     Pack years: 34.00    Smokeless tobacco: Never Used    Tobacco comment: \"wants to try vaping\"   Substance and Sexual Activity    Alcohol use: Yes     Alcohol/week: 21.0 standard drinks     Types: 1 Standard drinks or equivalent, 20 Shots of liquor per week     Comment: 20 drinks per wek    Drug use: No    Sexual activity: Yes     Partners: Female     Birth control/protection: None   Other Topics Concern    Not on file   Social History Narrative    Not on file     Social Determinants of Health     Financial Resource Strain:     Difficulty of Paying Living Expenses: Not on file   Food Insecurity:     Worried About Running Out of Food in the Last Year: Not on file    Juno of Food in the Last Year: Not on file   Transportation Needs:     Lack of Transportation (Medical): Not on file    Lack of Transportation (Non-Medical):  Not on file   Physical Activity:     Days of Exercise per Week: Not on file    Minutes of Exercise per Session: Not on file   Stress:     Feeling of Stress : Not on file   Social Connections:     Frequency of Communication with Friends and Family: Not on file    Frequency of Social Gatherings with Friends and Family: Not on file    Attends Christian Services: Not on file    Active Member of Clubs or Organizations: Not on file    Attends Club or Organization Meetings: Not on file    Marital Status: Not on file   Intimate Partner Violence:     Fear of Current or Ex-Partner: Not on file    Emotionally Abused: Not on file    Physically Abused: Not on file    Sexually Abused: Not on file   Housing Stability:     Unable to Pay for Housing in the Last Year: Not on file    Number of Places Lived in the Last Year: Not on file    Unstable Housing in the Last Year: Not on file         ALLERGIES: Wellbutrin [bupropion], Adhesive tape-silicones, Azithromycin, Cephalexin, Diclofenac, Hydromorphone, and Ketamine    Review of Systems   HENT: Positive for facial swelling. Respiratory: Positive for chest tightness and wheezing. Gastrointestinal: Negative for nausea and vomiting. Skin: Positive for rash. Neurological: Positive for tremors. Psychiatric/Behavioral: The patient is nervous/anxious. All other systems reviewed and are negative. Vitals:    04/30/22 1234   BP: (!) 176/94   Pulse: 87   Resp: 24   Temp: 97.8 °F (36.6 °C)   SpO2: 97%   Weight: 90.7 kg (200 lb)   Height: 5' 7\" (1.702 m)            Physical Exam  Vitals and nursing note reviewed. Constitutional:       General: He is in acute distress. Appearance: He is ill-appearing. HENT:      Head: Normocephalic and atraumatic. Mouth/Throat:      Tongue: No lesions. Tongue does not deviate from midline. Pharynx: No oropharyngeal exudate, posterior oropharyngeal erythema or uvula swelling. Eyes:      Extraocular Movements: Extraocular movements intact. Pupils: Pupils are equal, round, and reactive to light. Cardiovascular:      Rate and Rhythm: Regular rhythm. Tachycardia present. Pulses: Normal pulses. Heart sounds: Normal heart sounds. Pulmonary:      Effort: Tachypnea present. Breath sounds: Examination of the right-upper field reveals wheezing. Examination of the left-upper field reveals wheezing. Examination of the right-middle field reveals wheezing. Examination of the left-middle field reveals wheezing. Wheezing present. Abdominal:      General: Abdomen is flat. Palpations: Abdomen is soft. Musculoskeletal:         General: Normal range of motion. Cervical back: Normal range of motion and neck supple. Skin:     General: Skin is warm and dry. Findings: Rash (Diffuse hives involving the face, chest abdomen back extremities. ) present. Psychiatric:         Mood and Affect: Mood normal.         Behavior: Behavior normal.          MDM  Number of Diagnoses or Management Options  Diagnosis management comments: A/P: Anaphylaxis, allergic reaction. 25-year-old male present emergency department with symptoms consistent with anaphylaxis patient currently on a percent O2 on room air diffuse wheezing, hives will treat with IM epi 0.3 mg, Benadryl 50 mg IV, Pepcid 20 mg IV, 125 mg Solu-Medrol. We will continue to monitor         Procedures      Total critical care time spent exclusive of procedures:  35 min. 1:45 PM  On reassessment patient's symptoms have greatly improved we will continue to monitor patient for rebound reaction. 3:09 PM  Change of shift. Care of patient signed over to Cara Hutchins MD.  Bedside handoff complete. Awaiting reassessment.

## 2022-04-30 NOTE — ED TRIAGE NOTES
Patient arrives with c/o of allergic reaction to medication, presenting with tremors, elevated HR, and reports tongue swelling at \"tip of tongue. \"    Sx present for past 60 - 90 minutes. States took diclofenac 75 mg tablet for the first time this morning, and symptoms began one hour after. Patient's airway is patent, able to speak in full sentences. Medication prescribed by Dr. Aracelis Alvarado, spine specialist.    Also took 2  prednisone this morning.      Patient denies chest pain, SOB, N/V.

## 2022-04-30 NOTE — ED NOTES
3:31 PM  Change of shift. Care of patient taken over from Dr. Kaylen Miller; H&P reviewed, bedside handoff complete. Awaiting reassessment. Plan to observe until 4 PM.  I reassessed him at 3 PM and he continues to be asymptomatic.

## 2022-05-02 LAB
ATRIAL RATE: 77 BPM
CALCULATED P AXIS, ECG09: 44 DEGREES
CALCULATED R AXIS, ECG10: 60 DEGREES
CALCULATED T AXIS, ECG11: 75 DEGREES
DIAGNOSIS, 93000: NORMAL
P-R INTERVAL, ECG05: 152 MS
Q-T INTERVAL, ECG07: 402 MS
QRS DURATION, ECG06: 88 MS
QTC CALCULATION (BEZET), ECG08: 454 MS
VENTRICULAR RATE, ECG03: 77 BPM

## 2023-09-18 ASSESSMENT — SLEEP AND FATIGUE QUESTIONNAIRES
DO YOU HAVE DIFFICULTY OPERATING A MOTOR VEHICLE FOR LONG DISTANCES (GREATER THAN 100 MILES) BECAUSE YOU BECOME SLEEPY: NO
HOW LIKELY ARE YOU TO NOD OFF OR FALL ASLEEP WHEN YOU ARE A PASSENGER IN A CAR FOR AN HOUR WITHOUT A BREAK: 0
HOW LIKELY ARE YOU TO NOD OFF OR FALL ASLEEP WHILE WATCHING TV: WOULD NEVER DOZE
DO YOU GENERALLY HAVE DIFFICULTY REMEMBERING THINGS BECAUSE YOU ARE SLEEPY OR TIRED: NO
HOW LIKELY ARE YOU TO NOD OFF OR FALL ASLEEP WHILE SITTING QUIETLY AFTER LUNCH WITHOUT ALCOHOL: 0
HOW LIKELY ARE YOU TO NOD OFF OR FALL ASLEEP IN A CAR, WHILE STOPPED FOR A FEW MINUTES IN TRAFFIC: 0
DO YOU HAVE DIFFICULTY WATCHING A MOVIE OR VIDEO BECAUSE YOU BECOME SLEEPY OR TIRED: NO
ESS TOTAL SCORE: 0
HOW LIKELY ARE YOU TO NOD OFF OR FALL ASLEEP WHILE SITTING INACTIVE IN A PUBLIC PLACE: 0
DO YOU HAVE DIFFICULTY OPERATING A MOTOR VEHICLE FOR SHORT DISTANCES (LESS THAN 100 MILES) BECAUSE YOU BECOME SLEEPY: NO
FOSQ SCORE: 20
HOW LIKELY ARE YOU TO NOD OFF OR FALL ASLEEP WHILE LYING DOWN TO REST IN THE AFTERNOON WHEN CIRCUMSTANCES PERMIT: WOULD NEVER DOZE
HAS YOUR RELATIONSHIP WITH FAMILY, FRIENDS OR WORK COLLEAGUES BEEN AFFECTED BECAUSE YOU ARE SLEEPY OR TIRED: NO
DO YOU HAVE DIFFICULTY BEING AS ACTIVE AS YOU WANT TO BE IN THE EVENING BECAUSE YOU ARE SLEEPY OR TIRED: NO
HOW LIKELY ARE YOU TO NOD OFF OR FALL ASLEEP IN A CAR, WHILE STOPPED FOR A FEW MINUTES IN TRAFFIC: WOULD NEVER DOZE
HOW LIKELY ARE YOU TO NOD OFF OR FALL ASLEEP WHILE SITTING QUIETLY AFTER LUNCH WITHOUT ALCOHOL: WOULD NEVER DOZE
HOW LIKELY ARE YOU TO NOD OFF OR FALL ASLEEP WHILE SITTING AND TALKING TO SOMEONE: 0
DO YOU HAVE DIFFICULTY BEING AS ACTIVE AS YOU WANT TO BE IN THE MORNING BECAUSE YOU ARE SLEEPY OR TIRED: NO
HOW LIKELY ARE YOU TO NOD OFF OR FALL ASLEEP WHILE SITTING AND TALKING TO SOMEONE: WOULD NEVER DOZE
HOW LIKELY ARE YOU TO NOD OFF OR FALL ASLEEP WHEN YOU ARE A PASSENGER IN A CAR FOR AN HOUR WITHOUT A BREAK: WOULD NEVER DOZE
HOW LIKELY ARE YOU TO NOD OFF OR FALL ASLEEP WHILE SITTING INACTIVE IN A PUBLIC PLACE: WOULD NEVER DOZE
DO YOU HAVE DIFFICULTY CONCENTRATING ON THE THINGS YOU DO BECAUSE YOU ARE SLEEPY OR TIRED: NO
HAS YOUR MOOD BEEN AFFECTED BECAUSE YOU ARE SLEEPY OR TIRED: NO
HOW LIKELY ARE YOU TO NOD OFF OR FALL ASLEEP WHILE SITTING AND READING: 0
HOW LIKELY ARE YOU TO NOD OFF OR FALL ASLEEP WHILE SITTING AND READING: WOULD NEVER DOZE
HOW LIKELY ARE YOU TO NOD OFF OR FALL ASLEEP WHILE LYING DOWN TO REST IN THE AFTERNOON WHEN CIRCUMSTANCES PERMIT: 0
DO YOU HAVE DIFFICULTY VISITING YOUR FAMILY OR FRIENDS IN THEIR HOME BECAUSE YOU BECOME SLEEPY OR TIRED: NO
HOW LIKELY ARE YOU TO NOD OFF OR FALL ASLEEP WHILE WATCHING TV: 0

## 2023-09-20 ENCOUNTER — OFFICE VISIT (OUTPATIENT)
Age: 53
End: 2023-09-20
Payer: COMMERCIAL

## 2023-09-20 VITALS
SYSTOLIC BLOOD PRESSURE: 148 MMHG | OXYGEN SATURATION: 97 % | BODY MASS INDEX: 28.64 KG/M2 | HEIGHT: 67 IN | WEIGHT: 182.5 LBS | HEART RATE: 71 BPM | DIASTOLIC BLOOD PRESSURE: 87 MMHG

## 2023-09-20 DIAGNOSIS — F51.01 PRIMARY INSOMNIA: ICD-10-CM

## 2023-09-20 DIAGNOSIS — G47.33 OSA (OBSTRUCTIVE SLEEP APNEA): Primary | ICD-10-CM

## 2023-09-20 PROCEDURE — 99213 OFFICE O/P EST LOW 20 MIN: CPT | Performed by: SPECIALIST

## 2023-09-20 RX ORDER — EPINEPHRINE 0.3 MG/.3ML
INJECTION SUBCUTANEOUS
COMMUNITY
Start: 2022-04-30

## 2023-09-20 RX ORDER — TRAZODONE HYDROCHLORIDE 50 MG/1
TABLET ORAL NIGHTLY PRN
COMMUNITY
Start: 2023-01-27

## 2023-09-20 RX ORDER — CHLORAL HYDRATE 500 MG
2000 CAPSULE ORAL DAILY
COMMUNITY

## 2023-09-20 RX ORDER — METFORMIN HYDROCHLORIDE 500 MG/1
TABLET, EXTENDED RELEASE ORAL
COMMUNITY
Start: 2022-04-21

## 2023-09-20 RX ORDER — AMLODIPINE BESYLATE 5 MG/1
5 TABLET ORAL DAILY
COMMUNITY
Start: 2023-02-28

## 2023-09-22 ENCOUNTER — CLINICAL DOCUMENTATION (OUTPATIENT)
Age: 53
End: 2023-09-22

## 2023-11-08 ENCOUNTER — HOSPITAL ENCOUNTER (OUTPATIENT)
Facility: HOSPITAL | Age: 53
Discharge: HOME | End: 2023-11-11
Payer: COMMERCIAL

## 2023-11-08 VITALS
BODY MASS INDEX: 29.76 KG/M2 | OXYGEN SATURATION: 98 % | HEIGHT: 67 IN | TEMPERATURE: 97.5 F | DIASTOLIC BLOOD PRESSURE: 76 MMHG | SYSTOLIC BLOOD PRESSURE: 141 MMHG | RESPIRATION RATE: 14 BRPM | WEIGHT: 189.6 LBS | HEART RATE: 68 BPM

## 2023-11-08 PROBLEM — M25.551 RIGHT HIP PAIN: Status: ACTIVE | Noted: 2023-11-08

## 2023-11-08 PROBLEM — M87.051 AVASCULAR NECROSIS OF BONE OF RIGHT HIP (HCC): Status: ACTIVE | Noted: 2023-11-08

## 2023-11-08 LAB
ANION GAP SERPL CALC-SCNC: 8 MMOL/L (ref 5–15)
BUN SERPL-MCNC: 7 MG/DL (ref 6–20)
BUN/CREAT SERPL: 9 (ref 12–20)
CALCIUM SERPL-MCNC: 9 MG/DL (ref 8.5–10.1)
CHLORIDE SERPL-SCNC: 104 MMOL/L (ref 97–108)
CO2 SERPL-SCNC: 28 MMOL/L (ref 21–32)
CREAT SERPL-MCNC: 0.8 MG/DL (ref 0.7–1.3)
GLUCOSE SERPL-MCNC: 88 MG/DL (ref 65–100)
POTASSIUM SERPL-SCNC: 3.1 MMOL/L (ref 3.5–5.1)
SODIUM SERPL-SCNC: 140 MMOL/L (ref 136–145)

## 2023-11-08 PROCEDURE — 80048 BASIC METABOLIC PNL TOTAL CA: CPT

## 2023-11-08 PROCEDURE — 36415 COLL VENOUS BLD VENIPUNCTURE: CPT

## 2023-11-08 PROCEDURE — 93005 ELECTROCARDIOGRAM TRACING: CPT | Performed by: ORTHOPAEDIC SURGERY

## 2023-11-08 RX ORDER — SILDENAFIL 100 MG/1
100 TABLET, FILM COATED ORAL PRN
COMMUNITY

## 2023-11-08 ASSESSMENT — ENCOUNTER SYMPTOMS
WHEEZING: 0
TROUBLE SWALLOWING: 0
COUGH: 0
SHORTNESS OF BREATH: 0
VOMITING: 0
ABDOMINAL PAIN: 0
SORE THROAT: 0
NAUSEA: 0
BLOOD IN STOOL: 0

## 2023-11-08 ASSESSMENT — PAIN SCALES - GENERAL: PAINLEVEL_OUTOF10: 3

## 2023-11-09 LAB
EKG ATRIAL RATE: 68 BPM
EKG DIAGNOSIS: NORMAL
EKG P AXIS: 43 DEGREES
EKG P-R INTERVAL: 164 MS
EKG Q-T INTERVAL: 452 MS
EKG QRS DURATION: 94 MS
EKG QTC CALCULATION (BAZETT): 480 MS
EKG R AXIS: 69 DEGREES
EKG T AXIS: 42 DEGREES
EKG VENTRICULAR RATE: 68 BPM

## 2023-11-09 PROCEDURE — 93010 ELECTROCARDIOGRAM REPORT: CPT | Performed by: STUDENT IN AN ORGANIZED HEALTH CARE EDUCATION/TRAINING PROGRAM

## 2023-11-10 RX ORDER — POTASSIUM CHLORIDE 20 MEQ/1
20 TABLET, EXTENDED RELEASE ORAL DAILY
Qty: 5 TABLET | Refills: 0 | Status: SHIPPED | OUTPATIENT
Start: 2023-11-10

## 2023-11-10 NOTE — PERIOP NOTE
Potassium level of 3.1 on BMP drawn 11/8/23. Rx for KDUR 20 meq 1 tab daily for 5 days sent to pharmacy.

## 2023-11-16 ENCOUNTER — ANESTHESIA (OUTPATIENT)
Facility: HOSPITAL | Age: 53
End: 2023-11-16
Payer: COMMERCIAL

## 2023-11-16 ENCOUNTER — APPOINTMENT (OUTPATIENT)
Facility: HOSPITAL | Age: 53
End: 2023-11-16
Attending: ORTHOPAEDIC SURGERY
Payer: COMMERCIAL

## 2023-11-16 ENCOUNTER — HOSPITAL ENCOUNTER (OUTPATIENT)
Facility: HOSPITAL | Age: 53
Setting detail: OUTPATIENT SURGERY
Discharge: HOME | End: 2023-11-16
Attending: ORTHOPAEDIC SURGERY | Admitting: ORTHOPAEDIC SURGERY
Payer: COMMERCIAL

## 2023-11-16 ENCOUNTER — ANESTHESIA EVENT (OUTPATIENT)
Facility: HOSPITAL | Age: 53
End: 2023-11-16
Payer: COMMERCIAL

## 2023-11-16 VITALS
HEART RATE: 63 BPM | RESPIRATION RATE: 14 BRPM | OXYGEN SATURATION: 92 % | HEIGHT: 67 IN | TEMPERATURE: 97.6 F | DIASTOLIC BLOOD PRESSURE: 62 MMHG | WEIGHT: 189.82 LBS | BODY MASS INDEX: 29.79 KG/M2 | SYSTOLIC BLOOD PRESSURE: 99 MMHG

## 2023-11-16 LAB
GLUCOSE BLD STRIP.AUTO-MCNC: 111 MG/DL (ref 65–117)
SERVICE CMNT-IMP: NORMAL

## 2023-11-16 PROCEDURE — 2580000003 HC RX 258: Performed by: ORTHOPAEDIC SURGERY

## 2023-11-16 PROCEDURE — 3600000002 HC SURGERY LEVEL 2 BASE: Performed by: ORTHOPAEDIC SURGERY

## 2023-11-16 PROCEDURE — 7100000010 HC PHASE II RECOVERY - FIRST 15 MIN: Performed by: ORTHOPAEDIC SURGERY

## 2023-11-16 PROCEDURE — 82962 GLUCOSE BLOOD TEST: CPT

## 2023-11-16 PROCEDURE — 3600000012 HC SURGERY LEVEL 2 ADDTL 15MIN: Performed by: ORTHOPAEDIC SURGERY

## 2023-11-16 PROCEDURE — 2500000003 HC RX 250 WO HCPCS: Performed by: NURSE ANESTHETIST, CERTIFIED REGISTERED

## 2023-11-16 PROCEDURE — 6360000002 HC RX W HCPCS: Performed by: NURSE ANESTHETIST, CERTIFIED REGISTERED

## 2023-11-16 PROCEDURE — 2580000003 HC RX 258: Performed by: STUDENT IN AN ORGANIZED HEALTH CARE EDUCATION/TRAINING PROGRAM

## 2023-11-16 PROCEDURE — 6360000002 HC RX W HCPCS: Performed by: ORTHOPAEDIC SURGERY

## 2023-11-16 PROCEDURE — 6360000004 HC RX CONTRAST MEDICATION: Performed by: ORTHOPAEDIC SURGERY

## 2023-11-16 PROCEDURE — 3700000001 HC ADD 15 MINUTES (ANESTHESIA): Performed by: ORTHOPAEDIC SURGERY

## 2023-11-16 PROCEDURE — 7100000011 HC PHASE II RECOVERY - ADDTL 15 MIN: Performed by: ORTHOPAEDIC SURGERY

## 2023-11-16 PROCEDURE — 2709999900 HC NON-CHARGEABLE SUPPLY: Performed by: ORTHOPAEDIC SURGERY

## 2023-11-16 PROCEDURE — 3700000000 HC ANESTHESIA ATTENDED CARE: Performed by: ORTHOPAEDIC SURGERY

## 2023-11-16 RX ORDER — MIDAZOLAM HYDROCHLORIDE 2 MG/2ML
2 INJECTION, SOLUTION INTRAMUSCULAR; INTRAVENOUS
Status: DISCONTINUED | OUTPATIENT
Start: 2023-11-16 | End: 2023-11-16 | Stop reason: HOSPADM

## 2023-11-16 RX ORDER — SODIUM CHLORIDE, SODIUM LACTATE, POTASSIUM CHLORIDE, CALCIUM CHLORIDE 600; 310; 30; 20 MG/100ML; MG/100ML; MG/100ML; MG/100ML
INJECTION, SOLUTION INTRAVENOUS CONTINUOUS
Status: DISCONTINUED | OUTPATIENT
Start: 2023-11-16 | End: 2023-11-16 | Stop reason: HOSPADM

## 2023-11-16 RX ORDER — DIPHENHYDRAMINE HYDROCHLORIDE 50 MG/ML
12.5 INJECTION INTRAMUSCULAR; INTRAVENOUS
Status: DISCONTINUED | OUTPATIENT
Start: 2023-11-16 | End: 2023-11-16 | Stop reason: HOSPADM

## 2023-11-16 RX ORDER — LIDOCAINE HYDROCHLORIDE 10 MG/ML
1 INJECTION, SOLUTION EPIDURAL; INFILTRATION; INTRACAUDAL; PERINEURAL
Status: DISCONTINUED | OUTPATIENT
Start: 2023-11-16 | End: 2023-11-16 | Stop reason: HOSPADM

## 2023-11-16 RX ORDER — PROPOFOL 10 MG/ML
INJECTION, EMULSION INTRAVENOUS PRN
Status: DISCONTINUED | OUTPATIENT
Start: 2023-11-16 | End: 2023-11-16 | Stop reason: SDUPTHER

## 2023-11-16 RX ORDER — IBUPROFEN 800 MG/1
800 TABLET ORAL EVERY 6 HOURS
Qty: 60 TABLET | Refills: 0 | Status: SHIPPED | OUTPATIENT
Start: 2023-11-16

## 2023-11-16 RX ORDER — MIDAZOLAM HYDROCHLORIDE 1 MG/ML
INJECTION INTRAMUSCULAR; INTRAVENOUS PRN
Status: DISCONTINUED | OUTPATIENT
Start: 2023-11-16 | End: 2023-11-16 | Stop reason: SDUPTHER

## 2023-11-16 RX ORDER — LIDOCAINE HYDROCHLORIDE 10 MG/ML
INJECTION, SOLUTION INFILTRATION; PERINEURAL PRN
Status: DISCONTINUED | OUTPATIENT
Start: 2023-11-16 | End: 2023-11-16 | Stop reason: HOSPADM

## 2023-11-16 RX ORDER — FENTANYL CITRATE 50 UG/ML
INJECTION, SOLUTION INTRAMUSCULAR; INTRAVENOUS PRN
Status: DISCONTINUED | OUTPATIENT
Start: 2023-11-16 | End: 2023-11-16 | Stop reason: SDUPTHER

## 2023-11-16 RX ORDER — ONDANSETRON 2 MG/ML
4 INJECTION INTRAMUSCULAR; INTRAVENOUS
Status: DISCONTINUED | OUTPATIENT
Start: 2023-11-16 | End: 2023-11-16 | Stop reason: HOSPADM

## 2023-11-16 RX ORDER — ACETAMINOPHEN 325 MG/1
650 TABLET ORAL EVERY 4 HOURS
Qty: 120 TABLET | Refills: 1 | Status: SHIPPED | OUTPATIENT
Start: 2023-11-16 | End: 2023-12-16

## 2023-11-16 RX ORDER — ONDANSETRON 2 MG/ML
INJECTION INTRAMUSCULAR; INTRAVENOUS PRN
Status: DISCONTINUED | OUTPATIENT
Start: 2023-11-16 | End: 2023-11-16 | Stop reason: SDUPTHER

## 2023-11-16 RX ORDER — FENTANYL CITRATE 50 UG/ML
100 INJECTION, SOLUTION INTRAMUSCULAR; INTRAVENOUS
Status: DISCONTINUED | OUTPATIENT
Start: 2023-11-16 | End: 2023-11-16 | Stop reason: HOSPADM

## 2023-11-16 RX ORDER — LIDOCAINE HYDROCHLORIDE 20 MG/ML
INJECTION, SOLUTION EPIDURAL; INFILTRATION; INTRACAUDAL; PERINEURAL PRN
Status: DISCONTINUED | OUTPATIENT
Start: 2023-11-16 | End: 2023-11-16 | Stop reason: SDUPTHER

## 2023-11-16 RX ORDER — BUPIVACAINE HYDROCHLORIDE 5 MG/ML
INJECTION, SOLUTION PERINEURAL PRN
Status: DISCONTINUED | OUTPATIENT
Start: 2023-11-16 | End: 2023-11-16 | Stop reason: HOSPADM

## 2023-11-16 RX ADMIN — PROPOFOL 180 MCG/KG/MIN: 10 INJECTION, EMULSION INTRAVENOUS at 07:35

## 2023-11-16 RX ADMIN — FENTANYL CITRATE 50 MCG: 50 INJECTION, SOLUTION INTRAMUSCULAR; INTRAVENOUS at 07:27

## 2023-11-16 RX ADMIN — FENTANYL CITRATE 25 MCG: 50 INJECTION, SOLUTION INTRAMUSCULAR; INTRAVENOUS at 07:39

## 2023-11-16 RX ADMIN — LIDOCAINE HYDROCHLORIDE 80 MG: 20 INJECTION, SOLUTION EPIDURAL; INFILTRATION; INTRACAUDAL; PERINEURAL at 07:34

## 2023-11-16 RX ADMIN — MIDAZOLAM HYDROCHLORIDE 2 MG: 1 INJECTION, SOLUTION INTRAMUSCULAR; INTRAVENOUS at 07:27

## 2023-11-16 RX ADMIN — SODIUM CHLORIDE, POTASSIUM CHLORIDE, SODIUM LACTATE AND CALCIUM CHLORIDE: 600; 310; 30; 20 INJECTION, SOLUTION INTRAVENOUS at 06:42

## 2023-11-16 RX ADMIN — FENTANYL CITRATE 25 MCG: 50 INJECTION, SOLUTION INTRAMUSCULAR; INTRAVENOUS at 07:38

## 2023-11-16 RX ADMIN — ONDANSETRON 4 MG: 2 INJECTION INTRAMUSCULAR; INTRAVENOUS at 07:34

## 2023-11-16 RX ADMIN — PROPOFOL 50 MG: 10 INJECTION, EMULSION INTRAVENOUS at 07:34

## 2023-11-16 ASSESSMENT — ENCOUNTER SYMPTOMS: SHORTNESS OF BREATH: 1

## 2023-11-16 ASSESSMENT — PAIN - FUNCTIONAL ASSESSMENT: PAIN_FUNCTIONAL_ASSESSMENT: 0-10

## 2023-11-16 NOTE — H&P
Orthopaedic PRE-OP Admission History and Physical    Past Medical History:   Diagnosis Date    Adverse effect of anesthesia     \"difficulty waking up\"    Broken ribs 2017    Daily consumption of alcohol     Diabetes mellitus ([address])     Diabetic retinopathy ([address])     Diverticula of colon     GERD (gastroesophageal reflux disease)     occasional    Hypercholesteremia     Hypertension     Microscopic hematuria     Skin cancer 05/2018    nose/jaw    Sleep apnea     uses CPAP      Past Surgical History:   Procedure Laterality Date    ANTERIOR CRUCIATE LIGAMENT REPAIR Left 1990    screw removal    APPENDECTOMY  1992    COLONOSCOPY  2015    HEENT  Aug 1998    left ear tube placement    HEENT  2008    left ear TM graft    HEENT  Sep 2009    left ear surgery - cholestoma    HEENT  Sep 2010    left ear TM graft revision    ORTHOPEDIC SURGERY  1980's    left knee ACL repair    ORTHOPEDIC SURGERY Left 2000    reshape and removal scar tissue  left knee    ORTHOPEDIC SURGERY  09/2017    r foot surgery    ORTHOPEDIC SURGERY      left knee A/S x 3    OTHER SURGICAL HISTORY  05/2018    Skin can cer removed from nose/jaw    FL UNLISTED PROCEDURE ABDOMEN PERITONEUM & OMENTUM  06/2016    bowel resec with illeostomy due to perforated diverticuli    FL UNLISTED PROCEDURE ABDOMEN PERITONEUM & OMENTUM  09/2016    reversal of ileostomy    FL UNLISTED PROCEDURE ABDOMEN PERITONEUM & OMENTUM  2017    repair of hernia r/t ileostomy reversal      Prior to Admission medications    Medication Sig Start Date End Date Taking?  Authorizing Provider   potassium chloride (KLOR-CON M) 20 MEQ extended release tablet Take 1 tablet by mouth daily 11/10/23   Gianna Monday, APRN - NP   sildenafil (VIAGRA) 100 MG tablet Take 1 tablet by mouth as needed for Erectile Dysfunction  Patient not taking: Reported on 11/16/2023    Debra Rudolph MD   amLODIPine (NORVASC) 5 MG tablet Take 1 tablet by mouth every morning 2/28/23   Debra Rudolph MD

## 2023-11-16 NOTE — ANESTHESIA PRE PROCEDURE
Screening (If Applicable): No results found for: \"COVID19\"        Anesthesia Evaluation  Patient summary reviewed and Nursing notes reviewed history of anesthetic complications:   Airway: Mallampati: III  TM distance: >3 FB   Neck ROM: full  Mouth opening: > = 3 FB   Dental:    (+) poor dentition      Pulmonary: breath sounds clear to auscultation  (+) shortness of breath:  sleep apnea:                             Cardiovascular:  Exercise tolerance: good (>4 METS),   (+) hypertension:, hyperlipidemia        Rhythm: regular  Rate: normal                    Neuro/Psych:               GI/Hepatic/Renal:   (+) GERD:, morbid obesity          Endo/Other:    (+) DiabetesType II DM, well controlled, , .          Pt had no PAT visit       Abdominal:             Vascular: Other Findings:           Anesthesia Plan      MAC     ASA 3       Induction: intravenous. MIPS: Postoperative opioids intended and Prophylactic antiemetics administered. Anesthetic plan and risks discussed with patient and spouse. Plan discussed with CRNA.                     Panda Multani MD   11/16/2023

## 2023-11-16 NOTE — OP NOTE
OPERATIVE REPORT    Admit Date: 11/16/2023  Admit Diagnosis: Right hip pain [M25.551]    Date of Procedure: [unfilled]   Preoperative Diagnosis: Right hip pain [M25.551]  Postoperative Diagnosis: * No post-op diagnosis entered *    Procedure: Procedure(s):  RIGHT HIP CORTISONE INJECTION WITH SEDATION WITH SEDATION  Surgeon: Ezra Madrid MD  Assistant(s): None  Anesthesia: Monitor Anesthesia Care   Estimated Blood Loss: 20cc  Specimens: * No specimens in log *   Complications: None      INDICATIONS:  Dory Torres is a patient with possible hip arthritis and was indicated for an injection. We discussed risks, alternatives and expectations prior to surgery. PROCEDURE PERFORMED :   The patient was seen in the pre-operative holding area and the proper limb initialized. Questions were answered and the patient was taken to the operating room for anesthesia. They were positioned on the table and the operative extremity was prepped and draped in a sterile fashion. The appropriate time-out was performed prior to the start. Utilizing flouro the hip was identified on AP radiographs. A spinal needle was used and localized to the intra-articular joint capsule. The hip was first injected with 1cc of Radio-opaque dye to ensure intra-articular placement. The hip was then injected with a combination of 2cc of Kenalog 40mg / cc, 2cc of 1% Lidocaine and 2cc of 0.5% Marcaine under sterile conditions. I performed the injection. Hip Arthritis Tonnis Grade : 3    A sterile dressing was applied and the patient then recovered from anesthesia and was taken to the post-operative holding area in a stable condition.      POST OPERATIVE CONSIDERATIONS :   Gerardo Weldon MD

## 2023-11-16 NOTE — ANESTHESIA POSTPROCEDURE EVALUATION
Department of Anesthesiology  Postprocedure Note    Patient: Wendy Pedersen  MRN: [id number]  YOB: 1970  Date of evaluation: 11/16/2023      Procedure Summary     Date: 11/16/23 Room / Location: [facility] OR [address] AMBULATORY OR    Anesthesia Start: 0727 Anesthesia Stop: 1591    Procedure: RIGHT HIP CORTISONE INJECTION WITH SEDATION WITH SEDATION (Right: Hip) Diagnosis:       Right hip pain      (Right hip pain [M25.551])    Surgeons: Betsy Guan MD Responsible Provider: Halina Shelton MD    Anesthesia Type: MAC ASA Status: 3          Anesthesia Type: MAC    Juanita Phase I:      Juanita Phase II:        Anesthesia Post Evaluation    Patient location during evaluation: PACU  Patient participation: complete - patient participated  Level of consciousness: awake  Pain score: 0  Airway patency: patent  Nausea & Vomiting: no nausea and no vomiting  Complications: no  Cardiovascular status: blood pressure returned to baseline  Respiratory status: acceptable  Hydration status: euvolemic  Multimodal analgesia pain management approach  Pain management: adequate

## 2023-12-08 ENCOUNTER — TELEPHONE (OUTPATIENT)
Age: 53
End: 2023-12-08

## 2023-12-08 DIAGNOSIS — G47.33 OSA (OBSTRUCTIVE SLEEP APNEA): Primary | ICD-10-CM

## 2023-12-08 NOTE — TELEPHONE ENCOUNTER
Patient reports that his ResMed S10 APAP device started vibrating uncontrollably last night and he could \"regulate the air pressure with his breathing\". We will send a repair/replace order to [facility] to have device checked.

## 2023-12-08 NOTE — TELEPHONE ENCOUNTER
Patient on ResMed S10 at 8 cm. Contacted the office stating that the device was vibrating uncontrollably last night. Order entered for repair/replace ResMed CPAP at 8 cm.

## 2023-12-11 ENCOUNTER — CLINICAL DOCUMENTATION (OUTPATIENT)
Age: 53
End: 2023-12-11

## 2024-01-04 ENCOUNTER — TELEPHONE (OUTPATIENT)
Age: 54
End: 2024-01-04

## 2024-01-04 NOTE — TELEPHONE ENCOUNTER
Patient received replacement PAP device from [employer].  Scheduled for remote download only on 1/11/2024 at 3:15 PM.

## 2024-01-11 ENCOUNTER — PROCEDURE VISIT (OUTPATIENT)
Age: 54
End: 2024-01-11

## 2024-01-11 DIAGNOSIS — G47.33 OSA (OBSTRUCTIVE SLEEP APNEA): Primary | ICD-10-CM

## 2024-04-16 ENCOUNTER — PROCEDURE VISIT (OUTPATIENT)
Age: 54
End: 2024-04-16

## 2024-04-16 DIAGNOSIS — G47.33 OSA (OBSTRUCTIVE SLEEP APNEA): Primary | ICD-10-CM

## 2024-04-16 NOTE — PROGRESS NOTES
Patient having issues with his mask Resmed P-10 slipping off his head due to growing his hair out. Fit him with the [employer] Nasal Medium. He will call if he has a problem with this mask.

## 2024-09-21 ASSESSMENT — SLEEP AND FATIGUE QUESTIONNAIRES
DO YOU HAVE DIFFICULTY OPERATING A MOTOR VEHICLE FOR SHORT DISTANCES (LESS THAN 100 MILES) BECAUSE YOU BECOME SLEEPY: NO
HOW LIKELY ARE YOU TO NOD OFF OR FALL ASLEEP WHILE SITTING INACTIVE IN A PUBLIC PLACE: WOULD NEVER DOZE
DO YOU HAVE DIFFICULTY OPERATING A MOTOR VEHICLE FOR LONG DISTANCES (GREATER THAN 100 MILES) BECAUSE YOU BECOME SLEEPY: NO
HOW LIKELY ARE YOU TO NOD OFF OR FALL ASLEEP IN A CAR, WHILE STOPPED FOR A FEW MINUTES IN TRAFFIC: WOULD NEVER DOZE
HAS YOUR MOOD BEEN AFFECTED BECAUSE YOU ARE SLEEPY OR TIRED: NO
ESS TOTAL SCORE: 0
DO YOU HAVE DIFFICULTY BEING AS ACTIVE AS YOU WANT TO BE IN THE MORNING BECAUSE YOU ARE SLEEPY OR TIRED: NO
HOW LIKELY ARE YOU TO NOD OFF OR FALL ASLEEP WHILE LYING DOWN TO REST IN THE AFTERNOON WHEN CIRCUMSTANCES PERMIT: WOULD NEVER DOZE
HOW LIKELY ARE YOU TO NOD OFF OR FALL ASLEEP IN A CAR, WHILE STOPPED FOR A FEW MINUTES IN TRAFFIC: WOULD NEVER DOZE
HOW LIKELY ARE YOU TO NOD OFF OR FALL ASLEEP WHILE SITTING QUIETLY AFTER LUNCH WITHOUT ALCOHOL: WOULD NEVER DOZE
HOW LIKELY ARE YOU TO NOD OFF OR FALL ASLEEP WHEN YOU ARE A PASSENGER IN A CAR FOR AN HOUR WITHOUT A BREAK: WOULD NEVER DOZE
DO YOU HAVE DIFFICULTY WATCHING A MOVIE OR VIDEO BECAUSE YOU BECOME SLEEPY OR TIRED: NO
HOW LIKELY ARE YOU TO NOD OFF OR FALL ASLEEP WHILE SITTING AND TALKING TO SOMEONE: WOULD NEVER DOZE
FOSQ SCORE: 20
HOW LIKELY ARE YOU TO NOD OFF OR FALL ASLEEP WHILE SITTING AND READING: WOULD NEVER DOZE
HOW LIKELY ARE YOU TO NOD OFF OR FALL ASLEEP WHILE WATCHING TV: WOULD NEVER DOZE
DO YOU GENERALLY HAVE DIFFICULTY REMEMBERING THINGS BECAUSE YOU ARE SLEEPY OR TIRED: NO
HOW LIKELY ARE YOU TO NOD OFF OR FALL ASLEEP WHILE SITTING INACTIVE IN A PUBLIC PLACE: WOULD NEVER DOZE
HOW LIKELY ARE YOU TO NOD OFF OR FALL ASLEEP WHILE SITTING AND TALKING TO SOMEONE: WOULD NEVER DOZE
HOW LIKELY ARE YOU TO NOD OFF OR FALL ASLEEP WHEN YOU ARE A PASSENGER IN A CAR FOR AN HOUR WITHOUT A BREAK: WOULD NEVER DOZE
DO YOU HAVE DIFFICULTY CONCENTRATING ON THE THINGS YOU DO BECAUSE YOU ARE SLEEPY OR TIRED: NO
HOW LIKELY ARE YOU TO NOD OFF OR FALL ASLEEP WHILE SITTING AND READING: WOULD NEVER DOZE
DO YOU HAVE DIFFICULTY VISITING YOUR FAMILY OR FRIENDS IN THEIR HOME BECAUSE YOU BECOME SLEEPY OR TIRED: NO
DO YOU HAVE DIFFICULTY BEING AS ACTIVE AS YOU WANT TO BE IN THE EVENING BECAUSE YOU ARE SLEEPY OR TIRED: NO
HOW LIKELY ARE YOU TO NOD OFF OR FALL ASLEEP WHILE WATCHING TV: WOULD NEVER DOZE
HOW LIKELY ARE YOU TO NOD OFF OR FALL ASLEEP WHILE SITTING QUIETLY AFTER LUNCH WITHOUT ALCOHOL: WOULD NEVER DOZE
HOW LIKELY ARE YOU TO NOD OFF OR FALL ASLEEP WHILE LYING DOWN TO REST IN THE AFTERNOON WHEN CIRCUMSTANCES PERMIT: WOULD NEVER DOZE
HAS YOUR RELATIONSHIP WITH FAMILY, FRIENDS OR WORK COLLEAGUES BEEN AFFECTED BECAUSE YOU ARE SLEEPY OR TIRED: NO

## 2024-09-24 ENCOUNTER — OFFICE VISIT (OUTPATIENT)
Age: 54
End: 2024-09-24
Payer: COMMERCIAL

## 2024-09-24 VITALS
DIASTOLIC BLOOD PRESSURE: 81 MMHG | HEIGHT: 67 IN | OXYGEN SATURATION: 99 % | WEIGHT: 196 LBS | HEART RATE: 77 BPM | SYSTOLIC BLOOD PRESSURE: 147 MMHG | BODY MASS INDEX: 30.76 KG/M2

## 2024-09-24 DIAGNOSIS — G47.33 OSA (OBSTRUCTIVE SLEEP APNEA): Primary | ICD-10-CM

## 2024-09-24 PROCEDURE — 99213 OFFICE O/P EST LOW 20 MIN: CPT | Performed by: SPECIALIST

## 2024-09-25 ENCOUNTER — CLINICAL DOCUMENTATION (OUTPATIENT)
Age: 54
End: 2024-09-25

## 2025-07-16 ENCOUNTER — TRANSCRIBE ORDERS (OUTPATIENT)
Facility: HOSPITAL | Age: 55
End: 2025-07-16

## 2025-07-16 DIAGNOSIS — Z96.642 S/P TOTAL LEFT HIP ARTHROPLASTY: Primary | ICD-10-CM

## 2025-07-31 ENCOUNTER — HOSPITAL ENCOUNTER (OUTPATIENT)
Facility: HOSPITAL | Age: 55
Discharge: HOME | End: 2025-07-31
Attending: ORTHOPAEDIC SURGERY
Payer: COMMERCIAL

## 2025-07-31 DIAGNOSIS — Z96.642 S/P TOTAL LEFT HIP ARTHROPLASTY: ICD-10-CM

## 2025-07-31 PROCEDURE — 72192 CT PELVIS W/O DYE: CPT

## (undated) DEVICE — CONTAINER,SPECIMEN,3OZ,OR STRL: Brand: MEDLINE

## (undated) DEVICE — (D)PREP SKN CHLRAPRP APPL 26ML -- CONVERT TO ITEM 371833

## (undated) DEVICE — INTENDED FOR TISSUE SEPARATION, AND OTHER PROCEDURES THAT REQUIRE A SHARP SURGICAL BLADE TO PUNCTURE OR CUT.: Brand: BARD-PARKER ® CARBON RIB-BACK BLADES

## (undated) DEVICE — DEVON™ KNEE AND BODY STRAP 60" X 3" (1.5 M X 7.6 CM): Brand: DEVON

## (undated) DEVICE — MARKER,SKIN,WI/RULER AND LABELS: Brand: MEDLINE

## (undated) DEVICE — KERLIX BANDAGE ROLL: Brand: KERLIX

## (undated) DEVICE — GAUZE SPONGES,12 PLY: Brand: CURITY

## (undated) DEVICE — GOWN,SIRUS,FABRNF,XL,20/CS: Brand: MEDLINE

## (undated) DEVICE — Device

## (undated) DEVICE — SYR 10ML LUER LOK 1/5ML GRAD --

## (undated) DEVICE — NEEDLE HYPO 25GA L1.5IN BVL ORIENTED ECLIPSE

## (undated) DEVICE — SYRINGE MED 5ML STD CLR PLAS LUERLOCK TIP N CTRL DISP

## (undated) DEVICE — 1010 S-DRAPE TOWEL DRAPE 10/BX: Brand: STERI-DRAPE™

## (undated) DEVICE — GLOVE SURG SZ 85 L12IN FNGR ORTHO 126MIL CRM LTX FREE

## (undated) DEVICE — SYRINGE MARK SCALE W/ LL TIP 3ML 200 S/C

## (undated) DEVICE — RASP SURG W65XL13MM REPL RECIP L TEAR STRYKR FOR REMB COR

## (undated) DEVICE — DRAPE,REIN 53X77,STERILE: Brand: MEDLINE

## (undated) DEVICE — BANDAGE COMPR SELF ADH 5 YDX4 IN TAN STRL PREMIERPRO LF

## (undated) DEVICE — STRETCH BANDAGE ROLL: Brand: DERMACEA

## (undated) DEVICE — STERILE POLYISOPRENE POWDER-FREE SURGICAL GLOVES: Brand: PROTEXIS

## (undated) DEVICE — NEEDLE SPNL L3.5IN PNK HUB S STL REG WALL FIT STYL W/ QNCKE

## (undated) DEVICE — HYPODERMIC SAFETY NEEDLE: Brand: MAGELLAN

## (undated) DEVICE — STRAP,POSITIONING,KNEE/BODY,FOAM,4X60": Brand: MEDLINE

## (undated) DEVICE — TRIANGLE BLADE: Brand: ENDOTRAC

## (undated) DEVICE — SOL IRRIGATION INJ NACL 0.9% 500ML BTL

## (undated) DEVICE — BANDAGE COMPR 9 FTX4 IN SMOOTH COMFORTABLE SYNTH ESMRK LF

## (undated) DEVICE — SYRINGE MED 10ML LUERLOCK TIP W/O SFTY DISP

## (undated) DEVICE — SYRINGE FLSH IV STD 10 CC W/O CANN PREFILLED NACL POSIFLUSH 306546

## (undated) DEVICE — ZIMMER® STERILE DISPOSABLE TOURNIQUET CUFF WITH PROTECTIVE SLEEVE AND PLC, DUAL PORT, SINGLE BLADDER, 18 IN. (46 CM)

## (undated) DEVICE — SUTURE ETHLN SZ 4-0 L18IN NONABSORBABLE BLK L19MM PS-2 3/8 1667H

## (undated) DEVICE — CURITY NON-ADHERENT STRIPS: Brand: CURITY

## (undated) DEVICE — BANDAGE ADH W0.75XL3IN NAT PLAS CURAD

## (undated) DEVICE — APPLICATOR FBR TIP L6IN COT TIP WOOD SHFT SWAB 2000 PER CA

## (undated) DEVICE — DRAPE,U/ SHT,SPLIT,PLAS,STERIL: Brand: MEDLINE

## (undated) DEVICE — DRAPE,EXTREMITY,89X128,STERILE: Brand: MEDLINE

## (undated) DEVICE — NON-ADHERENT STRIPS,OIL EMULSION: Brand: CURITY

## (undated) DEVICE — SUTURE VCRL SZ 3-0 L27IN ABSRB UD L24MM PS-1 3/8 CIR PRIM J936H

## (undated) DEVICE — SYRINGE MED 1ML POLYCARB LUERLOCK HUB

## (undated) DEVICE — (D)SYR 10ML 1/5ML GRAD NSAF -- PKGING CHANGE USE ITEM 338027

## (undated) DEVICE — ADAPTER MON OD15X22MM ID15MM STD LUER FIT W/ LIFESAVER

## (undated) DEVICE — LIGHT HANDLE: Brand: DEVON

## (undated) DEVICE — SYR BULB 60ML IRRIGATION -- CONVERT TO ITEM 116413

## (undated) DEVICE — DRAPE C ARM W54XL84IN MINI FOR OEC 6800

## (undated) DEVICE — BLUNTFILL: Brand: MONOJECT

## (undated) DEVICE — REM POLYHESIVE ADULT PATIENT RETURN ELECTRODE: Brand: VALLEYLAB

## (undated) DEVICE — DRAPE CARM MINI FOR IMAG SYS INSIGHT FLROSCN

## (undated) DEVICE — COVER,MAYO STAND,STERILE: Brand: MEDLINE

## (undated) DEVICE — APPLICATOR MEDICATED 26 CC SOLUTION HI LT ORNG CHLORAPREP

## (undated) DEVICE — TOWEL,OR,DSP,ST,BLUE,STD,2/PK,40PK/CS: Brand: MEDLINE